# Patient Record
Sex: FEMALE | Race: WHITE | NOT HISPANIC OR LATINO | Employment: OTHER | URBAN - METROPOLITAN AREA
[De-identification: names, ages, dates, MRNs, and addresses within clinical notes are randomized per-mention and may not be internally consistent; named-entity substitution may affect disease eponyms.]

---

## 2021-03-03 ENCOUNTER — MEDICAL CORRESPONDENCE (OUTPATIENT)
Dept: HEALTH INFORMATION MANAGEMENT | Facility: CLINIC | Age: 86
End: 2021-03-03

## 2021-08-05 ENCOUNTER — HOSPITAL ENCOUNTER (EMERGENCY)
Facility: CLINIC | Age: 86
Discharge: HOME | End: 2021-08-05
Attending: EMERGENCY MEDICINE | Admitting: EMERGENCY MEDICINE
Payer: COMMERCIAL

## 2021-08-05 VITALS
OXYGEN SATURATION: 97 % | HEIGHT: 66 IN | DIASTOLIC BLOOD PRESSURE: 79 MMHG | TEMPERATURE: 97.9 F | SYSTOLIC BLOOD PRESSURE: 179 MMHG | WEIGHT: 160 LBS | RESPIRATION RATE: 24 BRPM | BODY MASS INDEX: 25.71 KG/M2 | HEART RATE: 79 BPM

## 2021-08-05 DIAGNOSIS — Z04.9 SUSPECTED CONDITION NOT FOUND: ICD-10-CM

## 2021-08-05 LAB
ANION GAP SERPL CALCULATED.3IONS-SCNC: 8 MMOL/L (ref 5–18)
BUN SERPL-MCNC: 35 MG/DL (ref 8–28)
CALCIUM SERPL-MCNC: 9.5 MG/DL (ref 8.5–10.5)
CHLORIDE BLD-SCNC: 104 MMOL/L (ref 98–107)
CO2 SERPL-SCNC: 29 MMOL/L (ref 22–31)
CREAT SERPL-MCNC: 0.9 MG/DL (ref 0.6–1.1)
DIGOXIN SERPL-MCNC: 0.8 UG/L
GFR SERPL CREATININE-BSD FRML MDRD: 57 ML/MIN/1.73M2
GLUCOSE BLD-MCNC: 108 MG/DL (ref 70–125)
HOLD SPECIMEN: NORMAL
POTASSIUM BLD-SCNC: 4.4 MMOL/L (ref 3.5–5)
SODIUM SERPL-SCNC: 141 MMOL/L (ref 136–145)

## 2021-08-05 PROCEDURE — 93005 ELECTROCARDIOGRAM TRACING: CPT

## 2021-08-05 PROCEDURE — 80048 BASIC METABOLIC PNL TOTAL CA: CPT | Performed by: EMERGENCY MEDICINE

## 2021-08-05 PROCEDURE — 36415 COLL VENOUS BLD VENIPUNCTURE: CPT | Performed by: EMERGENCY MEDICINE

## 2021-08-05 PROCEDURE — 93005 ELECTROCARDIOGRAM TRACING: CPT | Performed by: EMERGENCY MEDICINE

## 2021-08-05 PROCEDURE — 80162 ASSAY OF DIGOXIN TOTAL: CPT | Performed by: EMERGENCY MEDICINE

## 2021-08-05 PROCEDURE — 99284 EMERGENCY DEPT VISIT MOD MDM: CPT

## 2021-08-05 ASSESSMENT — MIFFLIN-ST. JEOR: SCORE: 1172.51

## 2021-08-05 NOTE — ED TRIAGE NOTES
Patient presents to the ED after getting a phone call because her potassium is off. Patient does not recall if it was high or low. The labs were drawn today at [facility]. Patient denies feeling any different than normal. Hx of hypertension. Upon looking in care everywhere potassium is 6.1.

## 2021-08-05 NOTE — ED PROVIDER NOTES
Coming from home with a potassium of 6.1 that was found on screening labs after seeing primary care doctor today.     Ohl, Sal Hudson,   08/05/21 4905

## 2021-08-06 LAB
ATRIAL RATE - MUSE: 97 BPM
DIASTOLIC BLOOD PRESSURE - MUSE: 74 MMHG
INTERPRETATION ECG - MUSE: NORMAL
P AXIS - MUSE: NORMAL DEGREES
PR INTERVAL - MUSE: NORMAL MS
QRS DURATION - MUSE: 108 MS
QT - MUSE: 358 MS
QTC - MUSE: 418 MS
R AXIS - MUSE: 24 DEGREES
SYSTOLIC BLOOD PRESSURE - MUSE: 165 MMHG
T AXIS - MUSE: 236 DEGREES
VENTRICULAR RATE- MUSE: 82 BPM

## 2021-08-06 NOTE — ED PROVIDER NOTES
EMERGENCY DEPARTMENT ENCOUNTER      NAME: Alicia Coates  AGE: 88 year old female  YOB: 1932  MRN: [id number]  EVALUATION DATE & TIME: 2021  7:27 PM    PCP: No primary care provider on file.    ED PROVIDER: Hermes Barbosa M.D.      Chief Complaint   Patient presents with     Abnormal Labs       FINAL IMPRESSION:  1. Suspected condition not found        ED COURSE & MEDICAL DECISION MAKIN year old female presents to the Emergency Department for evaluation of abnormal lab test.  She was seen in clinic receiving routine lab draw today when her potassium was found to be elevated at 6.1.  On redraw today her potassium is actually 4.4, suspect this result was erroneous due to hemolysis.  Patient herself has no complaints.  EKG shows chronic rate controlled atrial fibrillation, she does have some T wave inversions, it looks like reading the interpretations of outside record EKGs this likely is all chronic and stable especially in the absence of any new chest pain or dyspnea.  She was eager to leave after her repeat lab results here were finalized.  She can follow-up as needed with primary care to review any ongoing concerns.    At the conclusion of the encounter I discussed the results of all of the tests and the disposition. The questions were answered. The patient or family acknowledged understanding and was agreeable with the care plan.       MEDICATIONS GIVEN IN THE EMERGENCY:  Medications - No data to display    NEW PRESCRIPTIONS STARTED AT TODAY'S ER VISIT  Discharge Medication List as of 2021  8:57 PM             =================================================================    HPI    Patient information was obtained from: patient    Use of : N/A      Alicia Coates is a 88 year old female with a pertinent history of hypertension who presents to this ED via private vehicle for evaluation of an abnormal lab result.    Per chart review, patient was seen today at  [facility] with her PCP for a regular check up with labs. She had a potassium of 6.1.    Patient reports that she was called by her PCP to come in to the ED due to her potassium levels from earlier today. She denies any complaints, including shortness of breath, urinary symptoms, changes in bowel movements, abdominal pain, lightheadedness, chest pain, or any other complaints.    REVIEW OF SYSTEMS   All systems reviewed and negative except as noted in HPI.    PAST MEDICAL HISTORY:  No past medical history on file.    PAST SURGICAL HISTORY:  Past Surgical History:   Procedure Laterality Date     NO HISTORY OF SURGERY  6/11/13    derm           CURRENT MEDICATIONS:    No current facility-administered medications for this encounter.     Current Outpatient Medications   Medication     ammonium lactate (AMLACTIN) 12 % cream     clobetasol (TEMOVATE) 0.05 % external solution     digoxin (LANOXIN) 0.25 MG tablet     fluocinolone (DERMA-SMOOTHE/FS BODY) 0.01 % external oil     furosemide (LASIX) 10 MG TABS     hydrochlorothiazide (HYDRODIURIL) 25 MG tablet     ketoconazole (NIZORAL) 2 % shampoo     LEVOTHYROXINE SODIUM     metoprolol (TOPROL-XL) 50 MG 24 hr tablet     Multiple Vitamins-Minerals (MULTIVITAMIN & MINERAL PO)     rosuvastatin (CRESTOR) 10 MG tablet     terbinafine (LAMISIL AT) 1 % cream     WARFARIN SODIUM PO         ALLERGIES:  Allergies   Allergen Reactions     Neomycin Unknown     Nickel Unknown       FAMILY HISTORY:  No family history on file.    SOCIAL HISTORY:   Social History     Socioeconomic History     Marital status:      Spouse name: Not on file     Number of children: Not on file     Years of education: Not on file     Highest education level: Not on file   Occupational History     Not on file   Tobacco Use     Smoking status: Never Smoker     Smokeless tobacco: Never Used   Substance and Sexual Activity     Alcohol use: Not on file     Drug use: Not on file     Sexual activity: Not on  "file   Other Topics Concern     Parent/sibling w/ CABG, MI or angioplasty before 65F 55M? Not Asked   Social History Narrative     Not on file     Social Determinants of Health     Financial Resource Strain:      Difficulty of Paying Living Expenses:    Food Insecurity:      Worried About Running Out of Food in the Last Year:      Ran Out of Food in the Last Year:    Transportation Needs:      Lack of Transportation (Medical):      Lack of Transportation (Non-Medical):    Physical Activity:      Days of Exercise per Week:      Minutes of Exercise per Session:    Stress:      Feeling of Stress :    Social Connections:      Frequency of Communication with Friends and Family:      Frequency of Social Gatherings with Friends and Family:      Attends Congregational Services:      Active Member of Clubs or Organizations:      Attends Club or Organization Meetings:      Marital Status:    Intimate Partner Violence:      Fear of Current or Ex-Partner:      Emotionally Abused:      Physically Abused:      Sexually Abused:        VITALS:  BP (!) 179/79   Pulse 79   Temp 97.9  F (36.6  C) (Oral)   Resp 24   Ht 1.676 m (5' 6\")   Wt 72.6 kg (160 lb)   SpO2 97%   BMI 25.82 kg/m      PHYSICAL EXAM    Constitutional: Thin chronically ill-appearing elderly female patient, laying in bed, no distress  HENT: Normocephalic, Atraumatic. Neck Supple.  Eyes: EOMI, Conjunctiva normal.  Respiratory: Breathing comfortably on room air. Speaks full sentences easily. Lungs clear to ascultation.  Cardiovascular: Irregular heart rate, Irregular rhythm. No peripheral edema.  Abdomen: Soft, nontender  Musculoskeletal: Good range of motion in all major joints. No major deformities noted.  Integument: Warm, Dry.  Neurologic: Alert & awake, Normal motor function, Normal sensory function, No focal deficits noted.   Psychiatric: Cooperative. Affect appropriate.     LAB:  All pertinent labs reviewed and interpreted.  Labs Ordered and Resulted from Time " of ED Arrival Up to the Time of Departure from the ED   BASIC METABOLIC PANEL - Abnormal; Notable for the following components:       Result Value    Urea Nitrogen 35 (*)     GFR Estimate 57 (*)     All other components within normal limits   DIGOXIN LEVEL - Normal   EXTRA RED TOP TUBE   EXTRA GREEN TOP (LITHIUM HEPARIN) TUBE   EXTRA PURPLE TOP TUBE   EXTRA TUBE    Narrative:     The following orders were created for panel order [facility] Draw.  Procedure                               Abnormality         Status                     ---------                               -----------         ------                     Extra Red Top Tube[[id number]]                               Final result               Extra Green Top (Lithium...[[id number]]                      Final result               Extra Purple Top Tube[[id number]]                            Final result                 Please view results for these tests on the individual orders.         EKG:    Performed at: 20:14    Impression: Atrial fibrillation, incomplete right bundle branch block, minimal voltage criteria for LVH , anteroseptal infarct, ST and T wave abnormality consider inferolateral ischemia.    Rate: 82  Rhythm: Atrial fibrillation  Axis: 24  QRS Interval: 108  QTc Interval: 418  Comparison: No previous available    I have independently reviewed and interpreted the EKG(s) documented above.        I, Neena Sunshine, am serving as a scribe to document services personally performed by Dr. Hermes Barbosa, based on my observation and the provider's statements to me. I, Hermes Barbosa MD attest that Neena Sunshine is acting in a scribe capacity, has observed my performance of the services and has documented them in accordance with my direction.    Hermes Barbosa M.D.  Emergency Medicine  [facility] EMERGENCY ROOM  [address] [facility] [address] [phone]  Dept: [phone]     Hermes Barbosa,  MD  08/06/21 021

## 2021-08-06 NOTE — ED NOTES
Patient was told to come in to be seen by PCP as her potassium was elevated. She is having no s/s of elevated potassium.

## 2021-08-06 NOTE — DISCHARGE INSTRUCTIONS
You were seen today in the [facility] emergency department for elevated potassium levels on an outpatient blood draw.  Thankfully on a redraw of your blood today your potassium levels are normal.  This is a very common lab error that happens when there is hemolysis of your red blood cells.  We are glad that you are feeling well today and we do not think you need any additional follow-up specifically after this ED visit.  You can contact your primary doctor if you have any additional questions or concerns related to your encounter today.

## 2023-01-01 ENCOUNTER — LAB REQUISITION (OUTPATIENT)
Dept: LAB | Facility: CLINIC | Age: 88
End: 2023-01-01
Payer: COMMERCIAL

## 2023-01-01 DIAGNOSIS — R79.1 ABNORMAL COAGULATION PROFILE: ICD-10-CM

## 2023-01-01 DIAGNOSIS — R79.9 ABNORMAL FINDING OF BLOOD CHEMISTRY, UNSPECIFIED: ICD-10-CM

## 2023-01-01 LAB
INR PPP: 2.02 (ref 0.85–1.15)
INR PPP: 2.06 (ref 0.85–1.15)
INR PPP: 2.18 (ref 0.85–1.15)
INR PPP: 2.31 (ref 0.85–1.15)
INR PPP: 3.01 (ref 0.85–1.15)

## 2023-01-01 PROCEDURE — 36415 COLL VENOUS BLD VENIPUNCTURE: CPT | Mod: ORL | Performed by: FAMILY MEDICINE

## 2023-01-01 PROCEDURE — 85610 PROTHROMBIN TIME: CPT | Mod: ORL

## 2023-01-01 PROCEDURE — 85610 PROTHROMBIN TIME: CPT | Mod: ORL | Performed by: FAMILY MEDICINE

## 2023-01-01 PROCEDURE — P9603 ONE-WAY ALLOW PRORATED MILES: HCPCS | Mod: ORL | Performed by: FAMILY MEDICINE

## 2023-01-01 PROCEDURE — P9604 ONE-WAY ALLOW PRORATED TRIP: HCPCS | Mod: ORL | Performed by: FAMILY MEDICINE

## 2023-01-01 PROCEDURE — P9603 ONE-WAY ALLOW PRORATED MILES: HCPCS | Mod: ORL

## 2023-01-01 PROCEDURE — 36415 COLL VENOUS BLD VENIPUNCTURE: CPT | Mod: ORL

## 2023-02-15 ENCOUNTER — HOSPITAL ENCOUNTER (EMERGENCY)
Facility: CLINIC | Age: 88
Discharge: HOME | End: 2023-02-15
Attending: STUDENT IN AN ORGANIZED HEALTH CARE EDUCATION/TRAINING PROGRAM | Admitting: STUDENT IN AN ORGANIZED HEALTH CARE EDUCATION/TRAINING PROGRAM
Payer: COMMERCIAL

## 2023-02-15 ENCOUNTER — HOSPITAL ENCOUNTER (EMERGENCY)
Facility: CLINIC | Age: 88
End: 2023-02-15
Payer: COMMERCIAL

## 2023-02-15 VITALS
HEIGHT: 66 IN | OXYGEN SATURATION: 97 % | BODY MASS INDEX: 22.5 KG/M2 | SYSTOLIC BLOOD PRESSURE: 198 MMHG | DIASTOLIC BLOOD PRESSURE: 94 MMHG | TEMPERATURE: 98.9 F | WEIGHT: 140 LBS | RESPIRATION RATE: 16 BRPM | HEART RATE: 86 BPM

## 2023-02-15 DIAGNOSIS — R04.0 EPISTAXIS: ICD-10-CM

## 2023-02-15 LAB
ERYTHROCYTE [DISTWIDTH] IN BLOOD BY AUTOMATED COUNT: 14.1 % (ref 10–15)
HCT VFR BLD AUTO: 34.3 % (ref 35–47)
HGB BLD-MCNC: 11.2 G/DL (ref 11.7–15.7)
INR PPP: 2.17 (ref 0.85–1.15)
MCH RBC QN AUTO: 29.7 PG (ref 26.5–33)
MCHC RBC AUTO-ENTMCNC: 32.7 G/DL (ref 31.5–36.5)
MCV RBC AUTO: 91 FL (ref 78–100)
PLATELET # BLD AUTO: 210 10E3/UL (ref 150–450)
RBC # BLD AUTO: 3.77 10E6/UL (ref 3.8–5.2)
WBC # BLD AUTO: 8.5 10E3/UL (ref 4–11)

## 2023-02-15 PROCEDURE — 99283 EMERGENCY DEPT VISIT LOW MDM: CPT

## 2023-02-15 PROCEDURE — 99283 EMERGENCY DEPT VISIT LOW MDM: CPT | Mod: 25

## 2023-02-15 PROCEDURE — 85610 PROTHROMBIN TIME: CPT | Performed by: EMERGENCY MEDICINE

## 2023-02-15 PROCEDURE — 36415 COLL VENOUS BLD VENIPUNCTURE: CPT | Performed by: EMERGENCY MEDICINE

## 2023-02-15 PROCEDURE — 30901 CONTROL OF NOSEBLEED: CPT | Mod: LT

## 2023-02-15 PROCEDURE — 250N000009 HC RX 250: Performed by: EMERGENCY MEDICINE

## 2023-02-15 PROCEDURE — 85027 COMPLETE CBC AUTOMATED: CPT | Performed by: EMERGENCY MEDICINE

## 2023-02-15 RX ORDER — OXYMETAZOLINE HYDROCHLORIDE 0.05 G/100ML
2 SPRAY NASAL ONCE
Status: COMPLETED | OUTPATIENT
Start: 2023-02-15 | End: 2023-02-15

## 2023-02-15 RX ORDER — TRANEXAMIC ACID 100 MG/ML
500 INJECTION, SOLUTION INTRAVENOUS ONCE
Status: COMPLETED | OUTPATIENT
Start: 2023-02-15 | End: 2023-02-15

## 2023-02-15 RX ORDER — OXYMETAZOLINE HYDROCHLORIDE 0.05 G/100ML
2 SPRAY NASAL ONCE
Status: DISCONTINUED | OUTPATIENT
Start: 2023-02-15 | End: 2023-02-16 | Stop reason: HOSPADM

## 2023-02-15 RX ADMIN — OXYMETAZOLINE HCL 2 SPRAY: 0.05 SPRAY NASAL at 23:28

## 2023-02-15 RX ADMIN — TRANEXAMIC ACID 500 MG: 1 INJECTION, SOLUTION INTRAVENOUS at 23:28

## 2023-02-15 ASSESSMENT — ENCOUNTER SYMPTOMS
DIZZINESS: 0
LIGHT-HEADEDNESS: 0

## 2023-02-15 ASSESSMENT — ACTIVITIES OF DAILY LIVING (ADL): ADLS_ACUITY_SCORE: 35

## 2023-02-16 ENCOUNTER — HOSPITAL ENCOUNTER (EMERGENCY)
Facility: CLINIC | Age: 88
Discharge: HOME | End: 2023-02-16
Attending: EMERGENCY MEDICINE | Admitting: EMERGENCY MEDICINE
Payer: COMMERCIAL

## 2023-02-16 VITALS
BODY MASS INDEX: 25.82 KG/M2 | OXYGEN SATURATION: 95 % | WEIGHT: 160 LBS | TEMPERATURE: 97.5 F | SYSTOLIC BLOOD PRESSURE: 140 MMHG | RESPIRATION RATE: 18 BRPM | HEART RATE: 70 BPM | DIASTOLIC BLOOD PRESSURE: 82 MMHG

## 2023-02-16 DIAGNOSIS — R04.0 EPISTAXIS: ICD-10-CM

## 2023-02-16 NOTE — ED TRIAGE NOTES
Here for epistaxis, was seen about 1 hour ago at [facility] for same reason. Received afrin spray and got discharged.   Pt is on blood thinners. Nose clamp in place.      Triage Assessment     Row Name 02/15/23 2445       Triage Assessment (Adult)    Airway WDL WDL       Respiratory WDL    Respiratory WDL WDL       Skin Circulation/Temperature WDL    Skin Circulation/Temperature WDL WDL       Cardiac WDL    Cardiac WDL WDL       Peripheral/Neurovascular WDL    Peripheral Neurovascular WDL WDL       Cognitive/Neuro/Behavioral WDL    Cognitive/Neuro/Behavioral WDL WDL

## 2023-02-16 NOTE — DISCHARGE INSTRUCTIONS
Please return for any concerns.  As we discussed if the nose starts bleeding again use the Afrin, nose clamp provided in the emergency department for 30 minutes without removal to see if this does stop the bleeding.  If it does not stop the bleeding please return to the emergency department for further evaluation.

## 2023-02-16 NOTE — ED PROVIDER NOTES
EMERGENCY DEPARTMENT ENCOUNTER      NAME: Alicia Coates  AGE: 89 year old female  YOB: 1932  MRN: [id number]  EVALUATION DATE & TIME: No admission date for patient encounter.    PCP: Annita Coker    ED PROVIDER: Mairaluisa Brooks M.D.      Chief Complaint   Patient presents with     Epistaxis         FINAL IMPRESSION:  1. Epistaxis        MEDICAL DECISION MAKING:    Pertinent Labs & Imaging studies reviewed. (See chart for details)  ED Course as of 02/16/23 0144   Wed Feb 15, 2023   2322 Afebrile.  Vital signs here with hypertension.  Patient is coming in for evaluation of epistaxis.  Just recently seen discharged an hour ago for same reason.  History of warfarin use, INR has been therapeutic at home, no new levels elevated.  Here she had Afrin spray, nose clamp placed and had improvement of her symptoms.  By the time they got home she began bleeding again.  She did put her nose clamp on and does have resolution of her bleeding with her nose clamp however she takes it off she has recurrent bleeding.  She does not have any dizziness or lightheadedness.  No falls or trauma.    Physical exam for patient here sitting in chair without any acute cardiopulmonary distress.  She has dried blood around her right nare.  No active bleeding noted at this time with clamp in place.  No bleeding actively down her posterior oropharynx.    Handmade clamp was made with tongue depressors and tape, will try Afrin again, we will have her clear nasal passages and then use TXA as well.   2332 Had patient clear nasal passages with minimal amount of blood from right nare.  No active bleeding noted.  Used nasal spray TXA as well as Afrin, reclamped and will check in 30 minutes.     No further bleeding after clamp removed.  Patient given additional clamps to take home as well as the remaining Afrin.  Instructed patient and patient's son what to do if rebleed were to recur.  They are in agreement with plan    Medical Decision  Making    History:    Supplemental history from: Documented in chart, if applicable    External Record(s) reviewed: Documented in chart, if applicable.    Work Up:    Chart documentation includes differential considered and any EKGs or imaging independently interpreted by provider, where specified.    In additional to work up documented, I considered the following work up: Documented in chart, if applicable.    External consultation:    Discussion of management with another provider: Documented in chart, if applicable    Complicating factors:    Care impacted by chronic illness: Anticoagulated State, Dementia, Heart Disease and Hypertension    Care affected by social determinants of health: N/A    Disposition considerations: Discharge. No recommendations on prescription strength medication(s). See documentation for any additional details.          Critical care: 0 minutes excluding separately billable procedures.  Includes bedside management, time reviewing test results, review of records, discussing the case with staff, documenting the medical record and time spent with family members (or surrogate decision makers) discussing specific treatment issues.          ED COURSE:  11:18 PM I met with the patient, obtained history, performed an initial exam, and discussed options and plan for diagnostics and treatment here in the ED.  12:49 AM Re-evaluated patient.    1:36 AM Checked in on and updated patient. Patient's bleeding has stopped.     The importance of close follow up was discussed. We reviewed warning signs and symptoms, and I instructed Ms. Coates to return to the emergency department immediately if she develops any new or worsening symptoms. I provided additional verbal discharge instructions. Ms. Coates expressed understanding and agreement with this plan of care, her questions were answered, and she was discharged in stable condition.     MEDICATIONS GIVEN IN THE EMERGENCY:  Medications   oxymetazoline  (AFRIN) 0.05 % spray 2 spray (2 sprays Nasal Given by Other Clinician 2/15/23 7142)   tranexamic acid (CYKLOKAPRON) spray 500 mg (500 mg Right nostril Given by Other Clinician 2/15/23 2038)       NEW PRESCRIPTIONS STARTED AT TODAY'S ER VISIT:  New Prescriptions    No medications on file          =================================================================    HPI    Patient information was obtained from: Patient     Use of : N/A         Alicia Coates is a 89 year old female who presents for epistaxis.     Patient is coming in for evaluation of epistaxis. Just recently seen discharged an hour ago for same reason.  History of warfarin use, INR has been therapeutic at home, no new levels elevated. Patient had an Afrin spray here and nose clamp placed with improvement in symptoms. When discharged at at home bleeding started again. Denies dizziness or lightheadedness. No falls or trauma.    REVIEW OF SYSTEMS   Review of Systems   HENT: Positive for nosebleeds.    Neurological: Negative for dizziness and light-headedness.   All other systems reviewed and are negative.        PAST MEDICAL HISTORY:  History reviewed. No pertinent past medical history.    PAST SURGICAL HISTORY:  Past Surgical History:   Procedure Laterality Date     NO HISTORY OF SURGERY  6/11/13    derm       CURRENT MEDICATIONS:    No current facility-administered medications for this encounter.    Current Outpatient Medications:      ammonium lactate (AMLACTIN) 12 % cream, Apply  topically. Apply on trunk and extremities daily as a moisturizer., Disp: 385 g, Rfl: 11     clobetasol (TEMOVATE) 0.05 % external solution, Alterate with Tsal, Head and Shoulders (approximately 2-3x a week) - leave in approximately 5 minutes, massage well into scalp., Disp: 59 mL, Rfl: 0     digoxin (LANOXIN) 0.25 MG tablet, Take 250 mcg by mouth daily., Disp: , Rfl:      fluocinolone (DERMA-SMOOTHE/FS BODY) 0.01 % external oil, Apply  topically. Apply to  entire scalp for at least 4 hours before washing off. For scalp dermatitis, scaliness, dryness., Disp: 118 mL, Rfl: 3     furosemide (LASIX) 10 MG TABS, Take 10 mg by mouth daily., Disp: , Rfl:      hydrochlorothiazide (HYDRODIURIL) 25 MG tablet, Take 25 mg by mouth daily., Disp: , Rfl:      ketoconazole (NIZORAL) 2 % shampoo, Apply  topically daily as needed for itching., Disp: 120 mL, Rfl: 5     LEVOTHYROXINE SODIUM, .025 mg daily, Disp: , Rfl:      metoprolol (TOPROL-XL) 50 MG 24 hr tablet, Take 50 mg by mouth daily., Disp: , Rfl:      Multiple Vitamins-Minerals (MULTIVITAMIN & MINERAL PO), Take 1 tablet by mouth daily., Disp: , Rfl:      rosuvastatin (CRESTOR) 10 MG tablet, Take 10 mg by mouth daily., Disp: , Rfl:      terbinafine (LAMISIL AT) 1 % cream, Apply  topically 2 times daily. Apply topically to feet especially between toes twice daily for 1 month, Disp: 45 g, Rfl: 2     WARFARIN SODIUM PO, 5 mg 6 days. 2.5 mg 1 day, Disp: , Rfl:     ALLERGIES:  Allergies   Allergen Reactions     Neomycin Unknown     Nickel Unknown       FAMILY HISTORY:  History reviewed. No pertinent family history.    SOCIAL HISTORY:   Social History     Socioeconomic History     Marital status:    Tobacco Use     Smoking status: Never     Smokeless tobacco: Never       PHYSICAL EXAM:    Vitals: BP (!) 140/82   Pulse 70   Temp 97.5  F (36.4  C) (Temporal)   Resp 18   Wt 72.6 kg (160 lb)   SpO2 95%   BMI 25.82 kg/m     General:. Alert and interactive, comfortable appearing. Sitting in chair without any acute cardiopulmonary distress.  HENT: Oropharynx without erythema or exudates. MMM.  TMs clear bilaterally. Dried blood around her right nare, no active bleeding at time with clamp in place. No bleeding actively down her posterior oropharynx.  Eyes: Pupils mid-sized and equally reactive.   Neck: Full AROM.  No midline tenderness to palpation.  Cardiovascular: Regular rate and rhythm. Peripheral pulses 2+  bilaterally.  Chest/Pulmonary: Normal work of breathing. Lung sounds clear and equal throughout, no wheezes or crackles. No chest wall tenderness or deformities.  Skin: Warm and dry. Normal skin color.   Neuro: Speech clear. CNs grossly intact. Moves all extremities appropriately. Strength and sensation grossly intact to all extremities.   Psych: Normal affect/mood, cooperative, memory appropriate.     LAB:  All pertinent labs reviewed and interpreted.  Labs Ordered and Resulted from Time of ED Arrival to Time of ED Departure   INR - Abnormal       Result Value    INR 2.17 (*)    CBC WITH PLATELETS - Abnormal    WBC Count 8.5      RBC Count 3.77 (*)     Hemoglobin 11.2 (*)     Hematocrit 34.3 (*)     MCV 91      MCH 29.7      MCHC 32.7      RDW 14.1      Platelet Count 210         RADIOLOGY:  No orders to display         I, Leigh Moreira, am serving as a scribe to document services personally performed by Dr. Marialuisa Brooks  based on my observation and the provider's statements to me. I, Marialuisa Brooks MD attest that Leigh Moreira is acting in a scribe capacity, has observed my performance of the services and has documented them in accordance with my direction.      Marialuisa Brooks M.D.  Emergency Medicine  [facility] EMERGENCY ROOM  [address] [address] [phone]  Dept: [phone]     Marialuisa Brooks MD  02/16/23 0145

## 2023-02-16 NOTE — ED TRIAGE NOTES
Pt arrives to ED with c/o blood nose for the past hour. Pt is on war warfarin for afib and states this has never happened before. Denies clots. Bleeding controlled with nasal clamp.      Triage Assessment     Row Name 02/15/23 8330       Triage Assessment (Adult)    Airway WDL WDL       Respiratory WDL    Respiratory WDL WDL       Skin Circulation/Temperature WDL    Skin Circulation/Temperature WDL WDL       Cardiac WDL    Cardiac WDL WDL       Peripheral/Neurovascular WDL    Peripheral Neurovascular WDL WDL       Cognitive/Neuro/Behavioral WDL    Cognitive/Neuro/Behavioral WDL WDL

## 2023-06-25 ENCOUNTER — HOSPITAL ENCOUNTER (OUTPATIENT)
Facility: HOSPITAL | Age: 88
Setting detail: OBSERVATION
Discharge: NURSING, REHAB OR LONG-TERM CARE | End: 2023-06-27
Attending: EMERGENCY MEDICINE | Admitting: FAMILY MEDICINE
Payer: COMMERCIAL

## 2023-06-25 ENCOUNTER — APPOINTMENT (OUTPATIENT)
Dept: RADIOLOGY | Facility: HOSPITAL | Age: 88
End: 2023-06-25
Attending: EMERGENCY MEDICINE
Payer: COMMERCIAL

## 2023-06-25 ENCOUNTER — APPOINTMENT (OUTPATIENT)
Dept: CT IMAGING | Facility: HOSPITAL | Age: 88
End: 2023-06-25
Attending: EMERGENCY MEDICINE
Payer: COMMERCIAL

## 2023-06-25 DIAGNOSIS — R53.1 WEAKNESS GENERALIZED: ICD-10-CM

## 2023-06-25 DIAGNOSIS — R63.0 ANOREXIA: ICD-10-CM

## 2023-06-25 DIAGNOSIS — E43 SEVERE PROTEIN-CALORIE MALNUTRITION (H): Primary | ICD-10-CM

## 2023-06-25 PROBLEM — N18.9 CKD (CHRONIC KIDNEY DISEASE): Status: ACTIVE | Noted: 2023-06-25

## 2023-06-25 PROBLEM — I48.20 CHRONIC ATRIAL FIBRILLATION (H): Status: ACTIVE | Noted: 2023-06-25

## 2023-06-25 PROBLEM — E11.9 T2DM (TYPE 2 DIABETES MELLITUS) (H): Status: ACTIVE | Noted: 2023-06-25

## 2023-06-25 PROBLEM — I10 HTN (HYPERTENSION): Status: ACTIVE | Noted: 2023-06-25

## 2023-06-25 PROBLEM — E78.5 HLD (HYPERLIPIDEMIA): Status: ACTIVE | Noted: 2023-06-25

## 2023-06-25 PROBLEM — E03.9 HYPOTHYROIDISM: Status: ACTIVE | Noted: 2023-06-25

## 2023-06-25 LAB
ALBUMIN SERPL BCG-MCNC: 3.4 G/DL (ref 3.5–5.2)
ALBUMIN UR-MCNC: 30 MG/DL
ALP SERPL-CCNC: 93 U/L (ref 35–104)
ALT SERPL W P-5'-P-CCNC: 19 U/L (ref 0–50)
ANION GAP SERPL CALCULATED.3IONS-SCNC: 11 MMOL/L (ref 7–15)
APPEARANCE UR: CLEAR
AST SERPL W P-5'-P-CCNC: 29 U/L (ref 0–45)
BASOPHILS # BLD AUTO: 0 10E3/UL (ref 0–0.2)
BASOPHILS NFR BLD AUTO: 0 %
BILIRUB DIRECT SERPL-MCNC: 0.42 MG/DL (ref 0–0.3)
BILIRUB SERPL-MCNC: 1.3 MG/DL
BILIRUB UR QL STRIP: NEGATIVE
BUN SERPL-MCNC: 21.7 MG/DL (ref 8–23)
CALCIUM SERPL-MCNC: 9.6 MG/DL (ref 8.2–9.6)
CHLORIDE SERPL-SCNC: 99 MMOL/L (ref 98–107)
COLOR UR AUTO: YELLOW
CREAT SERPL-MCNC: 0.71 MG/DL (ref 0.51–0.95)
DEPRECATED HCO3 PLAS-SCNC: 24 MMOL/L (ref 22–29)
DIGOXIN SERPL-MCNC: 1.2 NG/ML (ref 0.6–2)
EOSINOPHIL # BLD AUTO: 0 10E3/UL (ref 0–0.7)
EOSINOPHIL NFR BLD AUTO: 0 %
ERYTHROCYTE [DISTWIDTH] IN BLOOD BY AUTOMATED COUNT: 14.3 % (ref 10–15)
GFR SERPL CREATININE-BSD FRML MDRD: 80 ML/MIN/1.73M2
GLUCOSE BLDC GLUCOMTR-MCNC: 120 MG/DL (ref 70–99)
GLUCOSE SERPL-MCNC: 136 MG/DL (ref 70–99)
GLUCOSE UR STRIP-MCNC: NEGATIVE MG/DL
HBA1C MFR BLD: 8.4 %
HCT VFR BLD AUTO: 42.7 % (ref 35–47)
HGB BLD-MCNC: 14.2 G/DL (ref 11.7–15.7)
HGB UR QL STRIP: NEGATIVE
HOLD SPECIMEN: NORMAL
IMM GRANULOCYTES # BLD: 0 10E3/UL
IMM GRANULOCYTES NFR BLD: 0 %
INR PPP: 2.63 (ref 0.85–1.15)
KETONES UR STRIP-MCNC: 10 MG/DL
LACTATE SERPL-SCNC: 1 MMOL/L (ref 0.7–2)
LEUKOCYTE ESTERASE UR QL STRIP: NEGATIVE
LIPASE SERPL-CCNC: 20 U/L (ref 13–60)
LYMPHOCYTES # BLD AUTO: 1.1 10E3/UL (ref 0.8–5.3)
LYMPHOCYTES NFR BLD AUTO: 11 %
MCH RBC QN AUTO: 28.5 PG (ref 26.5–33)
MCHC RBC AUTO-ENTMCNC: 33.3 G/DL (ref 31.5–36.5)
MCV RBC AUTO: 86 FL (ref 78–100)
MONOCYTES # BLD AUTO: 0.9 10E3/UL (ref 0–1.3)
MONOCYTES NFR BLD AUTO: 10 %
NEUTROPHILS # BLD AUTO: 7.3 10E3/UL (ref 1.6–8.3)
NEUTROPHILS NFR BLD AUTO: 79 %
NITRATE UR QL: NEGATIVE
NRBC # BLD AUTO: 0 10E3/UL
NRBC BLD AUTO-RTO: 0 /100
PH UR STRIP: 5.5 [PH] (ref 5–7)
PLATELET # BLD AUTO: 245 10E3/UL (ref 150–450)
POTASSIUM SERPL-SCNC: 4 MMOL/L (ref 3.4–5.3)
PROT SERPL-MCNC: 7 G/DL (ref 6.4–8.3)
RBC # BLD AUTO: 4.99 10E6/UL (ref 3.8–5.2)
RBC URINE: <1 /HPF
SODIUM SERPL-SCNC: 134 MMOL/L (ref 136–145)
SP GR UR STRIP: 1.03 (ref 1–1.03)
SQUAMOUS EPITHELIAL: <1 /HPF
T4 FREE SERPL-MCNC: 1.28 NG/DL (ref 0.9–1.7)
TROPONIN T SERPL HS-MCNC: 26 NG/L
TSH SERPL DL<=0.005 MIU/L-ACNC: 4.95 UIU/ML (ref 0.3–4.2)
UROBILINOGEN UR STRIP-MCNC: 3 MG/DL
WBC # BLD AUTO: 9.4 10E3/UL (ref 4–11)
WBC URINE: <1 /HPF

## 2023-06-25 PROCEDURE — 36415 COLL VENOUS BLD VENIPUNCTURE: CPT | Performed by: EMERGENCY MEDICINE

## 2023-06-25 PROCEDURE — 84484 ASSAY OF TROPONIN QUANT: CPT | Performed by: EMERGENCY MEDICINE

## 2023-06-25 PROCEDURE — 82962 GLUCOSE BLOOD TEST: CPT

## 2023-06-25 PROCEDURE — 84439 ASSAY OF FREE THYROXINE: CPT | Performed by: EMERGENCY MEDICINE

## 2023-06-25 PROCEDURE — 258N000003 HC RX IP 258 OP 636

## 2023-06-25 PROCEDURE — 82248 BILIRUBIN DIRECT: CPT | Performed by: EMERGENCY MEDICINE

## 2023-06-25 PROCEDURE — 96374 THER/PROPH/DIAG INJ IV PUSH: CPT

## 2023-06-25 PROCEDURE — G0378 HOSPITAL OBSERVATION PER HR: HCPCS

## 2023-06-25 PROCEDURE — 84484 ASSAY OF TROPONIN QUANT: CPT

## 2023-06-25 PROCEDURE — 83605 ASSAY OF LACTIC ACID: CPT | Performed by: EMERGENCY MEDICINE

## 2023-06-25 PROCEDURE — 250N000011 HC RX IP 250 OP 636: Performed by: EMERGENCY MEDICINE

## 2023-06-25 PROCEDURE — 99285 EMERGENCY DEPT VISIT HI MDM: CPT | Mod: 25

## 2023-06-25 PROCEDURE — 36415 COLL VENOUS BLD VENIPUNCTURE: CPT | Performed by: STUDENT IN AN ORGANIZED HEALTH CARE EDUCATION/TRAINING PROGRAM

## 2023-06-25 PROCEDURE — 71045 X-RAY EXAM CHEST 1 VIEW: CPT

## 2023-06-25 PROCEDURE — 96360 HYDRATION IV INFUSION INIT: CPT

## 2023-06-25 PROCEDURE — 36415 COLL VENOUS BLD VENIPUNCTURE: CPT

## 2023-06-25 PROCEDURE — 85025 COMPLETE CBC W/AUTO DIFF WBC: CPT | Performed by: EMERGENCY MEDICINE

## 2023-06-25 PROCEDURE — 84443 ASSAY THYROID STIM HORMONE: CPT | Performed by: EMERGENCY MEDICINE

## 2023-06-25 PROCEDURE — 80162 ASSAY OF DIGOXIN TOTAL: CPT

## 2023-06-25 PROCEDURE — 96361 HYDRATE IV INFUSION ADD-ON: CPT

## 2023-06-25 PROCEDURE — 70450 CT HEAD/BRAIN W/O DYE: CPT

## 2023-06-25 PROCEDURE — 258N000003 HC RX IP 258 OP 636: Performed by: EMERGENCY MEDICINE

## 2023-06-25 PROCEDURE — 82310 ASSAY OF CALCIUM: CPT | Performed by: EMERGENCY MEDICINE

## 2023-06-25 PROCEDURE — 83690 ASSAY OF LIPASE: CPT | Performed by: EMERGENCY MEDICINE

## 2023-06-25 PROCEDURE — 83735 ASSAY OF MAGNESIUM: CPT

## 2023-06-25 PROCEDURE — 85610 PROTHROMBIN TIME: CPT | Performed by: EMERGENCY MEDICINE

## 2023-06-25 PROCEDURE — 81001 URINALYSIS AUTO W/SCOPE: CPT | Performed by: EMERGENCY MEDICINE

## 2023-06-25 PROCEDURE — 83036 HEMOGLOBIN GLYCOSYLATED A1C: CPT

## 2023-06-25 RX ORDER — ONDANSETRON 2 MG/ML
4 INJECTION INTRAMUSCULAR; INTRAVENOUS EVERY 6 HOURS PRN
Status: DISCONTINUED | OUTPATIENT
Start: 2023-06-25 | End: 2023-06-27 | Stop reason: HOSPADM

## 2023-06-25 RX ORDER — DIGOXIN 125 MCG
250 TABLET ORAL DAILY
Status: DISCONTINUED | OUTPATIENT
Start: 2023-06-26 | End: 2023-06-27 | Stop reason: HOSPADM

## 2023-06-25 RX ORDER — LISINOPRIL 20 MG/1
20 TABLET ORAL DAILY
Status: DISCONTINUED | OUTPATIENT
Start: 2023-06-26 | End: 2023-06-27 | Stop reason: HOSPADM

## 2023-06-25 RX ORDER — LEVOTHYROXINE SODIUM 25 UG/1
25 TABLET ORAL
COMMUNITY

## 2023-06-25 RX ORDER — ACETAMINOPHEN 325 MG/1
650 TABLET ORAL EVERY 6 HOURS PRN
Status: DISCONTINUED | OUTPATIENT
Start: 2023-06-25 | End: 2023-06-27 | Stop reason: HOSPADM

## 2023-06-25 RX ORDER — WARFARIN SODIUM 5 MG/1
2.5-5 TABLET ORAL DAILY
COMMUNITY
End: 2023-06-28 | Stop reason: DRUGHIGH

## 2023-06-25 RX ORDER — DEXTROSE MONOHYDRATE 25 G/50ML
25-50 INJECTION, SOLUTION INTRAVENOUS
Status: DISCONTINUED | OUTPATIENT
Start: 2023-06-25 | End: 2023-06-27 | Stop reason: HOSPADM

## 2023-06-25 RX ORDER — SODIUM CHLORIDE, SODIUM LACTATE, POTASSIUM CHLORIDE, CALCIUM CHLORIDE 600; 310; 30; 20 MG/100ML; MG/100ML; MG/100ML; MG/100ML
INJECTION, SOLUTION INTRAVENOUS CONTINUOUS
Status: ACTIVE | OUTPATIENT
Start: 2023-06-25 | End: 2023-06-26

## 2023-06-25 RX ORDER — NICOTINE POLACRILEX 4 MG
15-30 LOZENGE BUCCAL
Status: DISCONTINUED | OUTPATIENT
Start: 2023-06-25 | End: 2023-06-27 | Stop reason: HOSPADM

## 2023-06-25 RX ORDER — ONDANSETRON 4 MG/1
4 TABLET, ORALLY DISINTEGRATING ORAL EVERY 6 HOURS PRN
Status: DISCONTINUED | OUTPATIENT
Start: 2023-06-25 | End: 2023-06-27 | Stop reason: HOSPADM

## 2023-06-25 RX ORDER — POLYETHYLENE GLYCOL 3350 17 G/17G
17 POWDER, FOR SOLUTION ORAL DAILY PRN
Status: DISCONTINUED | OUTPATIENT
Start: 2023-06-25 | End: 2023-06-27 | Stop reason: HOSPADM

## 2023-06-25 RX ORDER — HYDRALAZINE HYDROCHLORIDE 20 MG/ML
10 INJECTION INTRAMUSCULAR; INTRAVENOUS EVERY 6 HOURS PRN
Status: DISCONTINUED | OUTPATIENT
Start: 2023-06-25 | End: 2023-06-26

## 2023-06-25 RX ORDER — LISINOPRIL 20 MG/1
20 TABLET ORAL DAILY
COMMUNITY
End: 2024-01-01

## 2023-06-25 RX ORDER — HYDRALAZINE HYDROCHLORIDE 20 MG/ML
10 INJECTION INTRAMUSCULAR; INTRAVENOUS EVERY 6 HOURS PRN
Status: DISCONTINUED | OUTPATIENT
Start: 2023-06-25 | End: 2023-06-25

## 2023-06-25 RX ORDER — ACETAMINOPHEN 650 MG/1
650 SUPPOSITORY RECTAL EVERY 6 HOURS PRN
Status: DISCONTINUED | OUTPATIENT
Start: 2023-06-25 | End: 2023-06-27 | Stop reason: HOSPADM

## 2023-06-25 RX ORDER — LABETALOL HYDROCHLORIDE 5 MG/ML
10 INJECTION, SOLUTION INTRAVENOUS ONCE
Status: COMPLETED | OUTPATIENT
Start: 2023-06-25 | End: 2023-06-25

## 2023-06-25 RX ORDER — LEVOTHYROXINE SODIUM 25 UG/1
25 TABLET ORAL
Status: DISCONTINUED | OUTPATIENT
Start: 2023-06-26 | End: 2023-06-27 | Stop reason: HOSPADM

## 2023-06-25 RX ADMIN — LABETALOL HYDROCHLORIDE 10 MG: 5 INJECTION, SOLUTION INTRAVENOUS at 21:20

## 2023-06-25 RX ADMIN — SODIUM CHLORIDE, POTASSIUM CHLORIDE, SODIUM LACTATE AND CALCIUM CHLORIDE 250 ML: 600; 310; 30; 20 INJECTION, SOLUTION INTRAVENOUS at 23:40

## 2023-06-25 RX ADMIN — SODIUM CHLORIDE 250 ML: 9 INJECTION, SOLUTION INTRAVENOUS at 19:40

## 2023-06-25 ASSESSMENT — ACTIVITIES OF DAILY LIVING (ADL)
ADLS_ACUITY_SCORE: 35

## 2023-06-25 NOTE — ED PROVIDER NOTES
EMERGENCY DEPARTMENT NOTE     Name: Alicia Guerrero    Age/Sex: 89 year old female   MRN: [id number]   Evaluation Date & Time:  6/25/2023  6:26 PM    PCP:    Annita Coker   ED Provider: Andrés Smith D.O.       CHIEF COMPLAINT    Generalized Weakness       DIAGNOSIS & DISPOSITION/MEDICAL DECISION MAKING     1. Weakness generalized    2. Anorexia        Alicia Guerrero is a 89 year old female with relevant past history of COVID-19 infection, generalized weakness, and hypothyroidism who presents to this ED by EMS for evaluation of generalized weakness and decreased appetite.    Differential  diagnosis for considered included but not limited to ventricular ischemic CVA, subdural, sepsis from multiple sources including pneumonia or urinary tract, electrolyte derangement, symptomatic anemia, hyperthyroidism, symptomatic anemia, deconditioning from recent illness    Medical Decision Making  CT of the head without abnormality, chest x-ray without evidence of pneumonia or congestive heart failure, urinalysis negative for evidence of infection, TSH mildly elevated with normal free T4, CBC and basic metabolic profile within normal limits my EKG nonischemic, troponin 26 without delta change.  Suspect deconditioning from recent COVID infection.  Patient will be admitted observation, continued IV fluid hydration, PT and OT consult for determination of disposition.  Case was discussed with the [place] resident  Interventions: IV fluids  Discharge Vital Signs:BP (!) 170/75   Pulse 94   Temp 98.7  F (37.1  C) (Oral)   Resp 24   Wt 59 kg (130 lb)   SpO2 92%   BMI 20.98 kg/m      DISPOSITION: To [facility] observation/PVS    Diagnostic studies:  Imaging:  XR Chest 1 View   Final Result   IMPRESSION: Elevated right hemidiaphragm. No pneumothorax or pleural effusion. No focal consolidation. Stable mildly enlarged cardiac silhouette with atherosclerotic vascular calcifications.      Head CT w/o contrast   Final Result    IMPRESSION:   1.  No CT evidence for acute intracranial process.   2.  Brain atrophy and presumed chronic microvascular ischemic changes as above.         Lab:  Labs Ordered and Resulted from Time of ED Arrival to Time of ED Departure   BASIC METABOLIC PANEL - Abnormal       Result Value    Sodium 134 (*)     Potassium 4.0      Chloride 99      Carbon Dioxide (CO2) 24      Anion Gap 11      Urea Nitrogen 21.7      Creatinine 0.71      Calcium 9.6      Glucose 136 (*)     GFR Estimate 80     HEPATIC FUNCTION PANEL - Abnormal    Protein Total 7.0      Albumin 3.4 (*)     Bilirubin Total 1.3 (*)     Alkaline Phosphatase 93      AST 29      ALT 19      Bilirubin Direct 0.42 (*)    TROPONIN T, HIGH SENSITIVITY - Abnormal    Troponin T, High Sensitivity 26 (*)    TSH WITH FREE T4 REFLEX - Abnormal    TSH 4.95 (*)    ROUTINE UA WITH MICROSCOPIC REFLEX TO CULTURE - Abnormal    Color Urine Yellow      Appearance Urine Clear      Glucose Urine Negative      Bilirubin Urine Negative      Ketones Urine 10 (*)     Specific Gravity Urine 1.026      Blood Urine Negative      pH Urine 5.5      Protein Albumin Urine 30 (*)     Urobilinogen Urine 3.0 (*)     Nitrite Urine Negative      Leukocyte Esterase Urine Negative      RBC Urine <1      WBC Urine <1      Squamous Epithelials Urine <1     INR - Abnormal    INR 2.63 (*)    LIPASE - Normal    Lipase 20     LACTIC ACID WHOLE BLOOD - Normal    Lactic Acid 1.0     T4 FREE - Normal    Free T4 1.28     CBC WITH PLATELETS AND DIFFERENTIAL    WBC Count 9.4      RBC Count 4.99      Hemoglobin 14.2      Hematocrit 42.7      MCV 86      MCH 28.5      MCHC 33.3      RDW 14.3      Platelet Count 245      % Neutrophils 79      % Lymphocytes 11      % Monocytes 10      % Eosinophils 0      % Basophils 0      % Immature Granulocytes 0      NRBCs per 100 WBC 0      Absolute Neutrophils 7.3      Absolute Lymphocytes 1.1      Absolute Monocytes 0.9      Absolute Eosinophils 0.0      Absolute  Basophils 0.0      Absolute Immature Granulocytes 0.0      Absolute NRBCs 0.0                 Triage note reviewed:The pt presents via [facility] for evaluation of generalized weakness/severe fatigue which has been ongoing for the past 2 weeks since being diagnosed with COVID. Decreased appetite, decreased fluid intake.      PTA.       History:    Supplemental history from: Documented in chart, if applicable    External Record(s) reviewed: Documented in chart, if applicable.    Work Up:    Chart documentation includes differential considered and any EKGs or imaging independently interpreted by provider, where specified.    In additional to work up documented, I considered the following work up: Documented in chart, if applicable.    External consultation:    Discussion of management with another provider: Documented in chart, if applicable    Complicating factors:    Care impacted by chronic illness: Anticoagulated State, Cancer/Chemotherapy, Cerebrovascular Disease, Chronic Kidney Disease, Diabetes and Hypertension    Care affected by social determinants of health: N/A    Disposition considerations: Admit.    At the conclusion of the encounter I discussed the results of all of the tests and the disposition. The questions were answered. The patient or family acknowledged understanding and was agreeable with the care plan.    TOTAL CRITICAL CARE TIME (EXCLUDING PROCEDURES): Not applicable    PROCEDURES:   None    EMERGENCY DEPARTMENT COURSE   6:54 PM I met with the patient to gather history and to perform my initial exam.  We discussed treatment options and the plan for care while in the Emergency Department.  9:19 PM Spoke with Dr. Bergstad, [place], to admit patient.    ED INTERVENTIONS     Medications   0.9% sodium chloride BOLUS (0 mLs Intravenous Stopped 6/25/23 2018)   labetalol (NORMODYNE/TRANDATE) injection 10 mg (10 mg Intravenous $Given 6/25/23 2120)       DISCHARGE MEDICATIONS        Review of your  medicines      UNREVIEWED medicines. Ask your doctor about these medicines      Dose / Directions   digoxin 250 MCG tablet  Commonly known as: LANOXIN      Dose: 250 mcg  Take 250 mcg by mouth daily.  Refills: 0              INFORMATION SOURCE AND LIMITATIONS    History/Exam limitations:   Patient information was obtained from: Patient's Family member  Use of : N/A    HISTORY OF PRESENT ILLNESS   Alicia Guerrero is a 89 year old year old female with a relevant past history of COVID-19 infection, generalized weakness, and hypothyroidism who presents to this ED by EMS for evaluation of generalized weakness and decreased appetite.    Per the patient's family member, since the patient was diagnosed with COVID earlier this month she has been having generalized weakness and severe fatigue. The patient has recently been living in an assisted living facility for the past month at level 1 care.     Yesterday (6/24/23), her weakness and fatigued had worsened to the point that she had difficulty rising from her bed. In addition, he had noticed that her baseline leg swelling had also gone down and this had concerned him as she may not have been drinking much and has had a decreased appetite. She has only been able to eat 2-3 bites of her meals and drinks little which is unusual. Starting around Friday 6/23/23 (about two days ago) the patient had also become incontinent of urine and has been unable to bathe and shower herself on a normal basis.    She has had no complaints of pain, recent hospitalizations, history of broken bones or recent surgeries. Patient is on warfarin currently as well as levothyroxine and lasix.  She is DNR. No other complaints at this time.    REVIEW OF SYSTEMS:   All other systems reviewed and are negative except as noted above in HPI.    PATIENT HISTORY   No past medical history on file.  Patient Active Problem List   Diagnosis     AA (alopecia areata)     History of skin cancer      Seborrheic dermatitis     Diffuse photodamage of skin     AK (actinic keratosis)     Dermatitis, seborrheic     Anorexia     Weakness generalized     Past Surgical History:   Procedure Laterality Date     NO HISTORY OF SURGERY  6/11/13    derm       Allergies   Allergen Reactions     Neomycin Unknown     Nickel Unknown       OUTPATIENT MEDICATIONS     New Prescriptions    No medications on file      Vitals:    06/25/23 1930 06/25/23 1945 06/25/23 2013 06/25/23 2015   BP: (!) 185/81 (!) 209/96 (!) 229/102 (!) 224/93   Pulse: 95 99 110 100   Resp: 26 27 27 28   Temp:       TempSrc:       SpO2: 94% 96% 94% 94%   Weight:           Physical Exam   Constitutional: Oriented to person, place, and time. Appears well-developed and well-nourished.   HEENT:    Head: Atraumatic.   Neck: Normal range of motion. Neck supple.   Cardiovascular: Normal rate, irregular rhythm. 2/6 systolic murmur.   Pulmonary/Chest: Normal effort  and breath sounds normal.   Abdominal: Soft. Bowel sounds are normal.   Musculoskeletal: Normal range of motion.   Neurological: Alert and oriented to person, place, and time. Normal strength. No sensory deficit. No cranial nerve deficit.  Skin: Skin is warm and dry.   Psychiatric: Normal mood and affect. Behavior is normal. Thought content normal.     DIAGNOSTICS    LABORATORY FINDINGS (REVIEWED AND INTERPRETED):  Labs Ordered and Resulted from Time of ED Arrival to Time of ED Departure   BASIC METABOLIC PANEL - Abnormal       Result Value    Sodium 134 (*)     Potassium 4.0      Chloride 99      Carbon Dioxide (CO2) 24      Anion Gap 11      Urea Nitrogen 21.7      Creatinine 0.71      Calcium 9.6      Glucose 136 (*)     GFR Estimate 80     HEPATIC FUNCTION PANEL - Abnormal    Protein Total 7.0      Albumin 3.4 (*)     Bilirubin Total 1.3 (*)     Alkaline Phosphatase 93      AST 29      ALT 19      Bilirubin Direct 0.42 (*)    TROPONIN T, HIGH SENSITIVITY - Abnormal    Troponin T, High Sensitivity 26 (*)     TSH WITH FREE T4 REFLEX - Abnormal    TSH 4.95 (*)    ROUTINE UA WITH MICROSCOPIC REFLEX TO CULTURE - Abnormal    Color Urine Yellow      Appearance Urine Clear      Glucose Urine Negative      Bilirubin Urine Negative      Ketones Urine 10 (*)     Specific Gravity Urine 1.026      Blood Urine Negative      pH Urine 5.5      Protein Albumin Urine 30 (*)     Urobilinogen Urine 3.0 (*)     Nitrite Urine Negative      Leukocyte Esterase Urine Negative      RBC Urine <1      WBC Urine <1      Squamous Epithelials Urine <1     INR - Abnormal    INR 2.63 (*)    LIPASE - Normal    Lipase 20     LACTIC ACID WHOLE BLOOD - Normal    Lactic Acid 1.0     T4 FREE - Normal    Free T4 1.28     CBC WITH PLATELETS AND DIFFERENTIAL    WBC Count 9.4      RBC Count 4.99      Hemoglobin 14.2      Hematocrit 42.7      MCV 86      MCH 28.5      MCHC 33.3      RDW 14.3      Platelet Count 245      % Neutrophils 79      % Lymphocytes 11      % Monocytes 10      % Eosinophils 0      % Basophils 0      % Immature Granulocytes 0      NRBCs per 100 WBC 0      Absolute Neutrophils 7.3      Absolute Lymphocytes 1.1      Absolute Monocytes 0.9      Absolute Eosinophils 0.0      Absolute Basophils 0.0      Absolute Immature Granulocytes 0.0      Absolute NRBCs 0.0           IMAGING (REVIEWED AND INTERPRETED):  XR Chest 1 View   Final Result   IMPRESSION: Elevated right hemidiaphragm. No pneumothorax or pleural effusion. No focal consolidation. Stable mildly enlarged cardiac silhouette with atherosclerotic vascular calcifications.      Head CT w/o contrast   Final Result   IMPRESSION:   1.  No CT evidence for acute intracranial process.   2.  Brain atrophy and presumed chronic microvascular ischemic changes as above.          I, Cassidy Molina , am serving as a scribe to document services personally performed by Andrés Smith D.O., based on my observation and the provider s statements to me.    I, Andrés Smith D.O., attest that Cassidy  Tracy  is acting in a scribe capacity, has observed my performance of the services and has documented them in accordance with my direction.    Andrés Smith D.O.  EMERGENCY MEDICINE   06/25/23  [facility] EMERGENCY DEPARTMENT  [address] [place] [id number]  [phone]  Dept: [phone]     Andrés Smith DO  06/26/23 0259

## 2023-06-25 NOTE — ED NOTES
Bed: JNED-09  Expected date: 6/25/23  Expected time: 6:16 PM  Means of arrival: Ambulance  Comments:  89F Weakness; covid 2 weeks ago; /92 dehyration

## 2023-06-25 NOTE — ED TRIAGE NOTES
The pt presents via [facility] for evaluation of generalized weakness/severe fatigue which has been ongoing for the past 2 weeks since being diagnosed with COVID. Decreased appetite, decreased fluid intake.      PTA.

## 2023-06-26 ENCOUNTER — APPOINTMENT (OUTPATIENT)
Dept: CARDIOLOGY | Facility: HOSPITAL | Age: 88
End: 2023-06-26
Payer: COMMERCIAL

## 2023-06-26 ENCOUNTER — APPOINTMENT (OUTPATIENT)
Dept: PHYSICAL THERAPY | Facility: HOSPITAL | Age: 88
End: 2023-06-26
Payer: COMMERCIAL

## 2023-06-26 ENCOUNTER — APPOINTMENT (OUTPATIENT)
Dept: OCCUPATIONAL THERAPY | Facility: HOSPITAL | Age: 88
End: 2023-06-26
Payer: COMMERCIAL

## 2023-06-26 LAB
ANION GAP SERPL CALCULATED.3IONS-SCNC: 9 MMOL/L (ref 7–15)
BUN SERPL-MCNC: 17.8 MG/DL (ref 8–23)
CALCIUM SERPL-MCNC: 9 MG/DL (ref 8.2–9.6)
CHLORIDE SERPL-SCNC: 101 MMOL/L (ref 98–107)
CREAT SERPL-MCNC: 0.71 MG/DL (ref 0.51–0.95)
DEPRECATED HCO3 PLAS-SCNC: 25 MMOL/L (ref 22–29)
ERYTHROCYTE [DISTWIDTH] IN BLOOD BY AUTOMATED COUNT: 14.1 % (ref 10–15)
GFR SERPL CREATININE-BSD FRML MDRD: 80 ML/MIN/1.73M2
GLUCOSE BLDC GLUCOMTR-MCNC: 107 MG/DL (ref 70–99)
GLUCOSE BLDC GLUCOMTR-MCNC: 108 MG/DL (ref 70–99)
GLUCOSE BLDC GLUCOMTR-MCNC: 113 MG/DL (ref 70–99)
GLUCOSE BLDC GLUCOMTR-MCNC: 126 MG/DL (ref 70–99)
GLUCOSE BLDC GLUCOMTR-MCNC: 129 MG/DL (ref 70–99)
GLUCOSE BLDC GLUCOMTR-MCNC: 158 MG/DL (ref 70–99)
GLUCOSE SERPL-MCNC: 117 MG/DL (ref 70–99)
HCT VFR BLD AUTO: 39.2 % (ref 35–47)
HGB BLD-MCNC: 12.8 G/DL (ref 11.7–15.7)
INR PPP: 3.14 (ref 0.85–1.15)
LVEF ECHO: NORMAL
MAGNESIUM SERPL-MCNC: 1.8 MG/DL (ref 1.7–2.3)
MCH RBC QN AUTO: 28.4 PG (ref 26.5–33)
MCHC RBC AUTO-ENTMCNC: 32.7 G/DL (ref 31.5–36.5)
MCV RBC AUTO: 87 FL (ref 78–100)
PLATELET # BLD AUTO: 208 10E3/UL (ref 150–450)
POTASSIUM SERPL-SCNC: 4 MMOL/L (ref 3.4–5.3)
RBC # BLD AUTO: 4.5 10E6/UL (ref 3.8–5.2)
SODIUM SERPL-SCNC: 135 MMOL/L (ref 136–145)
TROPONIN T SERPL HS-MCNC: 26 NG/L
WBC # BLD AUTO: 7.1 10E3/UL (ref 4–11)

## 2023-06-26 PROCEDURE — 82962 GLUCOSE BLOOD TEST: CPT

## 2023-06-26 PROCEDURE — G0378 HOSPITAL OBSERVATION PER HR: HCPCS

## 2023-06-26 PROCEDURE — 93306 TTE W/DOPPLER COMPLETE: CPT

## 2023-06-26 PROCEDURE — 97166 OT EVAL MOD COMPLEX 45 MIN: CPT | Mod: GO

## 2023-06-26 PROCEDURE — 97161 PT EVAL LOW COMPLEX 20 MIN: CPT | Mod: GP

## 2023-06-26 PROCEDURE — 36415 COLL VENOUS BLD VENIPUNCTURE: CPT

## 2023-06-26 PROCEDURE — 96361 HYDRATE IV INFUSION ADD-ON: CPT

## 2023-06-26 PROCEDURE — 258N000003 HC RX IP 258 OP 636

## 2023-06-26 PROCEDURE — 93306 TTE W/DOPPLER COMPLETE: CPT | Mod: 26 | Performed by: INTERNAL MEDICINE

## 2023-06-26 PROCEDURE — 82310 ASSAY OF CALCIUM: CPT

## 2023-06-26 PROCEDURE — 97530 THERAPEUTIC ACTIVITIES: CPT | Mod: GP

## 2023-06-26 PROCEDURE — 99222 1ST HOSP IP/OBS MODERATE 55: CPT | Mod: GC

## 2023-06-26 PROCEDURE — 85610 PROTHROMBIN TIME: CPT

## 2023-06-26 PROCEDURE — 97535 SELF CARE MNGMENT TRAINING: CPT | Mod: GO

## 2023-06-26 PROCEDURE — 250N000013 HC RX MED GY IP 250 OP 250 PS 637

## 2023-06-26 PROCEDURE — 85027 COMPLETE CBC AUTOMATED: CPT

## 2023-06-26 RX ORDER — HYDRALAZINE HYDROCHLORIDE 20 MG/ML
5 INJECTION INTRAMUSCULAR; INTRAVENOUS EVERY 6 HOURS PRN
Status: DISCONTINUED | OUTPATIENT
Start: 2023-06-26 | End: 2023-06-27 | Stop reason: HOSPADM

## 2023-06-26 RX ORDER — SODIUM CHLORIDE 9 MG/ML
INJECTION, SOLUTION INTRAVENOUS CONTINUOUS
Status: DISCONTINUED | OUTPATIENT
Start: 2023-06-26 | End: 2023-06-27

## 2023-06-26 RX ADMIN — DIGOXIN 250 MCG: 125 TABLET ORAL at 08:04

## 2023-06-26 RX ADMIN — LISINOPRIL 20 MG: 20 TABLET ORAL at 08:04

## 2023-06-26 RX ADMIN — SODIUM CHLORIDE: 9 INJECTION, SOLUTION INTRAVENOUS at 14:37

## 2023-06-26 RX ADMIN — SODIUM CHLORIDE, POTASSIUM CHLORIDE, SODIUM LACTATE AND CALCIUM CHLORIDE: 600; 310; 30; 20 INJECTION, SOLUTION INTRAVENOUS at 00:03

## 2023-06-26 RX ADMIN — LEVOTHYROXINE SODIUM 25 MCG: 0.03 TABLET ORAL at 08:03

## 2023-06-26 ASSESSMENT — ACTIVITIES OF DAILY LIVING (ADL)
ADLS_ACUITY_SCORE: 37
ADLS_ACUITY_SCORE: 43
ADLS_ACUITY_SCORE: 35
ADLS_ACUITY_SCORE: 37
ADLS_ACUITY_SCORE: 35
DEPENDENT_IADLS:: CLEANING;COOKING;LAUNDRY;SHOPPING;MEAL PREPARATION;MEDICATION MANAGEMENT;MONEY MANAGEMENT;TRANSPORTATION;INCONTINENCE
ADLS_ACUITY_SCORE: 43
ADLS_ACUITY_SCORE: 43
ADLS_ACUITY_SCORE: 37
ADLS_ACUITY_SCORE: 43
ADLS_ACUITY_SCORE: 35
ADLS_ACUITY_SCORE: 35
ADLS_ACUITY_SCORE: 43

## 2023-06-26 NOTE — PHARMACY-ADMISSION MEDICATION HISTORY
Pharmacist Admission Medication History    Admission medication history is complete. The information provided in this note is only as accurate as the sources available at the time of the update.    Medication reconciliation/reorder completed by provider prior to medication history? No    Information Source(s): Family member and CareEverywhere/SureScripts via in-person    Pertinent Information: son sets up pills - warfarin as mixed up when not able to set up when pt was in covid quarantine     Changes made to PTA medication list:    Added: lisinopril    Deleted: otcs, topicals, hctx, lasix, metoprolol, crestor    Changed: warfarin    Medication Affordability:  Not including over the counter (OTC) medications, was there a time in the past 3 months when you did not take your medications as prescribed because of cost?: No    Allergies reviewed with patient and updates made in EHR: yes    Medication History Completed By: Carole Baldwin RPH 6/25/2023 9:32 PM    Prior to Admission medications    Medication Sig Last Dose Taking? Auth Provider Long Term End Date   digoxin (LANOXIN) 0.25 MG tablet Take 250 mcg by mouth daily. 6/25/2023 at am Yes Reported, Patient     levothyroxine (SYNTHROID/LEVOTHROID) 25 MCG tablet Take 25 mcg by mouth daily before breakfast 6/25/2023 at am Yes Unknown, Entered By History Yes    lisinopril (ZESTRIL) 20 MG tablet Take 20 mg by mouth daily 6/25/2023 at am Yes Unknown, Entered By History Yes    warfarin ANTICOAGULANT (COUMADIN) 5 MG tablet Take 2.5-5 mg by mouth daily Takes in the morning at home.  Was taking 2.5 mg (1/2 tablet) Mon, Fri (two days weekly) and 5 mg on the other days (5 days weekly) but INR was high so held 6/21/23 dose then was to take 2.5 mg daily until 6/27/23 recheck 6/25/2023 at 2.5 mg in am Yes Unknown, Entered By History

## 2023-06-26 NOTE — H&P
[facility]    History and Physical - Hospitalist Service       Date of Admission:  6/25/2023    Assessment & Plan      Alicia Coates is a 89 year old female admitted on 6/25/2023. She has a history of dementia, pAF (on warfarin), hypertension, hypothyroidism, T2DM and presents with generalized weakness and poor PO intake since COVID infection in early June.     Generalized weakness   Fatigue  Presents with worsening generalized weakness, fatigue, and PO intake. Patient tested positive for COVID on 6/8 and underwent a 10-day quarantine at her new [facility]. Since then, she has had progressive weakness and is now unable to rise from a lying or seated position. She has been sleeping much more than usual and eating/drinking very little. Vitals significant for hypertension. Exam with dry mucous membranes, systolic murmur. Labs largely unremarkable, including CBC, BMP, LFTs, lipase, lactate, INR, UA. Troponin 26. TSH mildly elevated 4.95 with T4 wnl. CXR clear. CT head negative for acute findings. At this time, seems most likely that patient is slow to recover from recent COVID infection given age, dementia, and new environment at [facility]. Depression also possible etiology, particularly given recent move to [facility] and social isolation during COVID quarantine. Differential also includes dehydration, progression of dementia, digoxin toxicity/adverse effect, subclinical hypothyroidism, electrolyte derangement (though work-up thus far normal), recent CVA (though reassuring neuro exam).    - PT/OT  - Magnesium lab   - Digoxin level   - Daily BMP, CBC  - Consider further mental health discussion/assessment     Anorexia    Dehydration, mild   Protein-calorie malnutrition  Unintentional weight loss   Per son, patient has had very poor PO intake of solids and liquids since she got COVID at the beginning of June. He noticed that her chronic significant bilateral lower extremity edema almost completely resolved,  "which made him worried that she is dehydrated. Weight is 130 lbs (unclear if this is per patient report or actual measurement), down from 150 lbs in 12/2022. VSS. Exam with dry mucous membranes. Labs without evidence of dehydration - normal electrolytes, renal function. Does have some ketones in her urine. Albumin mildly low 3.4. Does not appear overly dehydrated; however, given history of poor oral intake and dry mucous membranes, will give IVF overnight and assess response.   - S/p 500 mL bolus   - IVF: LR at 100 ml/h   - Measure weight   - Encourage PO intake   - Monitor I/Os  - Daily BMP, CBC     Hypertension   History of hypertension. On lisinopril 20 mg daily PTA. -220s/80-90s. No evidence of end organ dysfunction. Received 10 mg IV labetolol in ED.   - PTA lisinopril 20 mg daily   - IV hydralazine PRN for SBP > 200   - Vitals q4h     Systolic murmur  Exam with 3/6 systolic murmur, loudest at RUSB with radiation to carotid, also heard at LUSB and LLSB. Likely aortic stenosis. Possible that this could be contributing to her symptoms of generalized weakness, fatigue.   - Consider echocardiogram     Atrial fibrillation   History of afib. On digoxin and warfarin. INR 2.6. Telemetry with a fib, rate 80s-90s.   - PTA warfarin, pharmacy to dose   - PTA digoxin 250 mcg daily   - Digoxin level   - Daily INR     Hypothyroidism   History of hypothyroidism. TSH elevated 4.95, free T4 wnl at 1.28.   - PTA levothyroxine 25 mcg daily     Type 2 diabetes mellitus   History of T2DM without long-term use of insulin. PTA medications: none. Most recent A1C 6.7% in 12/2022; now 8.4%.  - POC glucose checks QID   - Medium resistance SSI     Discharge planning  Patient moved into [facility] in May 2023. She was living independently prior. Primary decision makers are son Scott and daughter Cristina. Patient is currently receiving \"level one assistance\" at [facility], which is the most basic option and includes " meals, well checks, medication management. Likely needs higher level of care.   - PT/OT/CM        Diet: Regular Diet Adult  DVT Prophylaxis: Warfarin  Elena Catheter: Not present  Fluids: LR at 100 ml/h   Lines: None     Cardiac Monitoring: None  Code Status: No CPR- Do NOT Intubate    Clinically Significant Risk Factors Present on Admission              # Hypoalbuminemia: Lowest albumin = 3.4 g/dL at 6/25/2023  6:41 PM, will monitor as appropriate  # Drug Induced Coagulation Defect: home medication list includes an anticoagulant medication    # Hypertension: Noted on problem list     # DMII: A1C = 8.4 % (Ref range: <5.7 %) within past 6 months             Disposition Plan      Expected Discharge Date: 06/26/2023                The patient's care was discussed with the Attending Physician, Dr. Mcnulty, at morning report 6/26.       Marilyn Diego MD  Hospitalist Service  [facility]  Securely message with [employer] (more info)  Text page via [facility] Paging/Directory   ______________________________________________________________________    Chief Complaint   Generalized weakness, poor PO intake    History is obtained from the patient and her son Scott     History of Present Illness   Alicia Coates is a 89 year old female admitted on 6/25/2023. She has a history of dementia, pAF (on warfarin), hypertension, hypothyroidism, T2DM and presents with generalized weakness and poor PO intake since COVID infection in early June.     Patient states that she does not know why she is in the hospital tonight.  She does not remember getting COVID a couple weeks ago.  She states that she is currently comfortable and has no complaints.  She denies pain, fever/chills, weakness.  She states she just feels tired.    Patient's son Scott is at bedside.  He reports that he brought patient to the emergency room today because she has been progressively weak over the last few days.  Patient is normally able to  ambulate well with a walker.  However, today patient was unable to rise from the seat or sit up in bed due to weakness.    Scott states that Alicia moved into [facility] assisted living on 2023.  She had been living independently prior to that.  She decided to move into assisted living because she was having a hard time with the stairs in her home, and she was feeling lonely and like the idea of living with other seniors.  She has had progressive memory impairment for the last year or 2 after her   in .  Moving into the assisted living was understandably hard adjustment, but patient was going to the dining room for meals and trying to socialize.    About 3 weeks after moving into assisted living, patient unfortunately tested positive for COVID on .  She had to do a 10-day quarantine.  It sounds like she tolerated the virus well; she had congestion and cough as well as fatigue, but no respiratory distress.    On , the day after her quarantine ended, Scott visited her in the assisted living.  He noticed that patient's chronic significant lower extremity edema had resolved completely.  This made him worried that she was not getting enough to drink during her quarantine.  He also noticed that she had not been eating much and missed a couple days of her medications.    Ever since her COVID quarantine, patient has seemed to have lost interest in eating or drinking.  She states that she gets full after a couple small bites.  She is also not going to the dining room to eat meals.  Patient is sleeping/napping most of the day for the last couple days.  She had an episode of urinary incontinence on the sofa during a nap yesterday, which was new for her.      Additionally, she has been very weak.  Again, she is normally able to ambulate with a walker.  The last couple days, she has gotten weaker and weaker.  Today, she was unable to stand from his seat independently, and she was unable to sit up from  lying down in bed.  She was able to walk with a walker once she was assisted to a standing position.    Patient reports no pain, constipation, diarrhea, lightheadedness, dizziness, headache, vision changes, cough, or respiratory symptoms.      History of mental health issues.  Scott notes that patient had a similar episode a few years ago, which resolved on its own with time.    Again, patient lives at [facility].  She is currently receiving Level One cares, which is the minimum amount of cares.       Past Medical History    Past Medical History:   Diagnosis Date     Chronic atrial fibrillation (H)      CKD (chronic kidney disease)     Stage 3a     History of skin cancer      HLD (hyperlipidemia)      HTN (hypertension)      Hypothyroidism      T2DM (type 2 diabetes mellitus) (H)        Past Surgical History   Past Surgical History:   Procedure Laterality Date     BUNIONECTOMY       MOHS MICROGRAPHIC PROCEDURE       NO HISTORY OF SURGERY  06/11/2013    derm       Prior to Admission Medications   Prior to Admission Medications   Prescriptions Last Dose Informant Patient Reported? Taking?   digoxin (LANOXIN) 0.25 MG tablet 6/25/2023 at am  Yes Yes   Sig: Take 250 mcg by mouth daily.   levothyroxine (SYNTHROID/LEVOTHROID) 25 MCG tablet 6/25/2023 at am  Yes Yes   Sig: Take 25 mcg by mouth daily before breakfast   lisinopril (ZESTRIL) 20 MG tablet 6/25/2023 at am  Yes Yes   Sig: Take 20 mg by mouth daily   warfarin ANTICOAGULANT (COUMADIN) 5 MG tablet 6/25/2023 at 2.5 mg in am  Yes Yes   Sig: Take 2.5-5 mg by mouth daily Takes in the morning at home.  Was taking 2.5 mg (1/2 tablet) Mon, Fri (two days weekly) and 5 mg on the other days (5 days weekly) but INR was high so held 6/21/23 dose then was to take 2.5 mg daily until 6/27/23 recheck      Facility-Administered Medications: None           Physical Exam   Vital Signs: Temp: 98.7  F (37.1  C) Temp src: Oral BP: (!) 226/98 Pulse: 88   Resp: 25  SpO2: 100 % O2 Device: Nasal cannula Oxygen Delivery: 1 LPM  Weight: 130 lbs 0 oz    GEN: Pleasant female, laying in bed, in no acute distress.   HEENT: Normocephalic, atraumatic. Extraoccular eye movements intact. Anicteric sclera. Dry oral mucous membranes.   NECK: Supple. No cervical or supraclavicular adenopathy.   PULM: Non-labored breathing. No use of accessory muscles. Clear to ausculation bilaterally. No wheezes or crackles.   CV: Irregularly irregular rhythm, rate in 80s. 3/5 systolic murmur loudest at RUSB and radiating to right carotid, also present at LUSB and LLSB.   ABDOMEN: Normoactive bowel sounds. Non-tender to palpation. Non-distended.   EXTREMITES:  No clubbing, cyanosis. Mild bilateral lower extremity edema to ankles, much less than baseline per son.  NEURO:  Awake. Oriented to person, place. Not oriented to time or situation. Cranial nerves 2-12 grossly intact. Moving all extremities. 4/5 strength in bilateral arms and legs.   PSYCH: Calm. Appropriate affect.     Data   ------------------------- PAST 24 HR DATA REVIEWED -----------------------------------------------    I have personally reviewed the following data over the past 24 hrs:    9.4  \   14.2   / 245     134 (L) 99 21.7 /  136 (H)   4.0 24 0.71 \       ALT: 19 AST: 29 AP: 93 TBILI: 1.3 (H)   ALB: 3.4 (L) TOT PROTEIN: 7.0 LIPASE: 20       Trop: 26 (H) BNP: N/A       TSH: 4.95 (H) T4: 1.28 A1C: 8.4 (H)       Procal: N/A CRP: N/A Lactic Acid: 1.0       INR:  2.63 (H) PTT:  N/A   D-dimer:  N/A Fibrinogen:  N/A       Imaging results reviewed over the past 24 hrs:   Recent Results (from the past 24 hour(s))   Head CT w/o contrast    Narrative    EXAM: CT HEAD W/O CONTRAST  LOCATION: [facility]  DATE: 6/25/2023    INDICATION: Weakness, hypertension on warfarin  COMPARISON: None.  TECHNIQUE: Routine CT Head without IV contrast. Multiplanar reformats. Dose reduction techniques were used.    FINDINGS:  INTRACRANIAL  CONTENTS: No intracranial hemorrhage, extraaxial collection, or mass effect.  No CT evidence of acute infarct. Mild to moderate presumed chronic small vessel ischemic changes. Chronic bilateral caudate lacunar infarcts. Mild to moderate   generalized volume loss. No hydrocephalus.     VISUALIZED ORBITS/SINUSES/MASTOIDS: Prior bilateral cataract surgery. Visualized portions of the orbits are otherwise unremarkable. No significant paranasal sinus mucosal disease. No middle ear or mastoid effusion.    BONES/SOFT TISSUES: No acute abnormality.      Impression    IMPRESSION:  1.  No CT evidence for acute intracranial process.  2.  Brain atrophy and presumed chronic microvascular ischemic changes as above.   XR Chest 1 View    Narrative    EXAM: XR CHEST 1 VIEW  LOCATION: [facility]  DATE: 6/25/2023    INDICATION: weaknees evaluatate for pneumonia  COMPARISON: 07/09/2021      Impression    IMPRESSION: Elevated right hemidiaphragm. No pneumothorax or pleural effusion. No focal consolidation. Stable mildly enlarged cardiac silhouette with atherosclerotic vascular calcifications.

## 2023-06-26 NOTE — PROGRESS NOTES
06/26/23 0852   Appointment Info   Signing Clinician's Name / Credentials (PT) Marilyn Coker DPT   Quick Adds   Quick Adds Certification   Living Environment   People in Home facility resident   Current Living Arrangements assisted living   Home Accessibility no concerns   Transportation Anticipated family or friend will provide;health plan transportation   Self-Care   Usual Activity Tolerance good   Current Activity Tolerance fair   Equipment Currently Used at Home walker, rolling   Activity/Exercise/Self-Care Comment Per chart FELIPE services include meals, well checks, medication management.   General Information   Onset of Illness/Injury or Date of Surgery 06/25/23   Referring Physician Marilyn Diego MD   Patient/Family Therapy Goals Statement (PT) none   Pertinent History of Current Problem (include personal factors and/or comorbidities that impact the POC) 89 year old female admitted on 6/25/2023. She has a history of dementia, pAF (on warfarin), hypertension, hypothyroidism, T2DM and presents with generalized weakness and poor PO intake since COVID infection in early June.   Existing Precautions/Restrictions fall   Strength (Manual Muscle Testing)   Strength (Manual Muscle Testing) Deficits observed during functional mobility   Bed Mobility   Bed Mobility supine-sit;sit-supine   Supine-Sit George (Bed Mobility) moderate assist (50% patient effort);2 person assist   Sit-Supine George (Bed Mobility) minimum assist (75% patient effort)   Bed Mobility Limitations decreased ability to use arms for pushing/pulling;decreased ability to use legs for bridging/pushing;impaired ability to control trunk for mobility   Assistive Device (Bed Mobility) bed rails;draw sheet   Transfers   Transfers sit-stand transfer   Sit-Stand Transfer   Sit-Stand George (Transfers) minimum assist (75% patient effort);2 person assist   Assistive Device (Sit-Stand Transfers) walker, front-wheeled   Gait/Stairs  (Locomotion)   [place] Level (Gait) minimum assist (75% patient effort)   Assistive Device (Gait) walker, front-wheeled   Distance in Feet 15'   Pattern (Gait) step-through   Deviations/Abnormal Patterns (Gait) gait speed decreased   Clinical Impression   Criteria for Skilled Therapeutic Intervention Yes, treatment indicated   PT Diagnosis (PT) Impaired functional mobility   Influenced by the following impairments Weakness, fatigue   Functional limitations due to impairments Impaired strength, transfers, gait   Clinical Presentation (PT Evaluation Complexity) Stable/Uncomplicated   Clinical Presentation Rationale Presents as diagnosed   Clinical Decision Making (Complexity) low complexity   Planned Therapy Interventions (PT) balance training;bed mobility training;gait training;home exercise program;strengthening;transfer training   Anticipated Equipment Needs at Discharge (PT) walker, rolling   Risk & Benefits of therapy have been explained patient   PT Total Evaluation Time   PT Naa Low Complexity Minutes (04469) 10   Therapy Certification   Start of care date 06/26/23   Certification date from 06/26/23   Certification date to 07/03/23   Medical Diagnosis Gen. weakness, anorexia   Physical Therapy Goals   PT Frequency Daily   PT Predicted Duration/Target Date for Goal Attainment 07/03/23   PT Goals Bed Mobility;Transfers;Gait   PT: Bed Mobility Independent;Supine to/from sit   PT: Transfers Supervision/stand-by assist;Sit to/from stand;Bed to/from chair;Assistive device   PT: Gait Supervision/stand-by assist;Rolling walker;150 feet   PT Discharge Planning   PT Plan progress transfers, amb with chair followKRISTEN ex   PT Discharge Recommendation (DC Rec) Transitional Care Facility   PT Rationale for DC Rec Ax1-2 for mobility.   PT Brief overview of current status Amb 15'x2 with FWW and min A.         [facility]  OUTPATIENT PHYSICAL THERAPY EVALUATION  PLAN OF TREATMENT FOR  OUTPATIENT REHABILITATION  (COMPLETE FOR INITIAL CLAIMS ONLY)  Patient's Last Name, First Name, M.I.  YOB: 1932  Alicia Coates                        Provider's Name  [facility] Medical Record No.  [id number]                             Onset Date:  06/25/23   Start of Care Date:  (P) 06/26/23   Type:     _X_PT   ___OT   ___SLP Medical Diagnosis:  (P) Gen. weakness, anorexia              PT Diagnosis:  (P) Impaired functional mobility Visits from SOC:  1     See note for plan of treatment, functional goals and certification details    I CERTIFY THE NEED FOR THESE SERVICES FURNISHED UNDER        THIS PLAN OF TREATMENT AND WHILE UNDER MY CARE     (Physician co-signature of this document indicates review and certification of the therapy plan).                Marilyn Coker, PT, DPT  6/26/2023

## 2023-06-26 NOTE — CONSULTS
Care Management Initial Consult    General Information  Assessment completed with: Andree son Scott  Type of CM/SW Visit: Initial Assessment    Primary Care Provider verified and updated as needed: Yes   Readmission within the last 30 days: no previous admission in last 30 days      Reason for Consult: discharge planning  Advance Care Planning: Advance Care Planning Reviewed: no concerns identified          Communication Assessment  Patient's communication style: spoken language (English or Bilingual)             Cognitive  Cognitive/Neuro/Behavioral: .WDL except, orientation  Level of Consciousness: confused  Arousal Level: opens eyes spontaneously  Orientation: disoriented to, place, time  Mood/Behavior: calm, cooperative  Best Language: 0 - No aphasia  Speech: clear    Living Environment:   People in home: facility resident     Current living Arrangements: assisted living  Name of Facility: [facility]   Able to return to prior arrangements: other (see comments) (TBD)       Family/Social Support:  Care provided by: self, other (see comments) (facility staff)  Provides care for: no one  Marital Status:   Children, Facility resident(s)/Staff          Description of Support System: Supportive, Involved    Support Assessment: Adequate family and caregiver support    Current Resources:   Patient receiving home care services: No     Community Resources: Skilled Nursing Facility ([facility])  Equipment currently used at home: walker, rolling, grab bar, toilet, grab bar, tub/shower, shower chair  Supplies currently used at home: Incontinence Supplies    Employment/Financial:  Employment Status:          Financial Concerns:             Does the patient's insurance plan have a 3 day qualifying hospital stay waiver?     Will the waiver be used for post-acute placement?      Lifestyle & Psychosocial Needs:  Social Determinants of Health     Tobacco Use: Low Risk  (6/25/2023)    Patient History      Smoking  "Tobacco Use: Never      Smokeless Tobacco Use: Never      Passive Exposure: Not on file   Alcohol Use: Not on file   Financial Resource Strain: Not on file   Food Insecurity: Not on file   Transportation Needs: Not on file   Physical Activity: Not on file   Stress: Not on file   Social Connections: Not on file   Intimate Partner Violence: Not on file   Depression: Not on file   Housing Stability: Not on file       Functional Status:  Prior to admission patient needed assistance:   Dependent ADLs:: Ambulation-walker, Bathing, Dressing, Incontinence, Transfers  Dependent IADLs:: Cleaning, Cooking, Laundry, Shopping, Meal Preparation, Medication Management, Money Management, Transportation, Incontinence  Assesssment of Functional Status: Not at baseline with ADL Functioning, Not at baseline with mobility, Not at  functional baseline    Mental Health Status:          Chemical Dependency Status:                Values/Beliefs:  Spiritual, Cultural Beliefs, Confucianism Practices, Values that affect care:                 Additional Information:  Spoke with son Scott on the phone, introduced self and care management role. Patient lives at [facility]. Scott states that currently she is on a \"level one\" service at the facility. Receives assist with meals, housekeeping, laundry, safety checks. Scott reports that patient moved into the facility one month ago and approx 2 weeks after moving in, tested positive for COVID. Scott states that she was \"put into a 10 day lock down\" and was quarantined to her room. He was not able to visit her and when he did, he found that she appeared thinner, was sleeping 16-20 hours and was incontinent, which was a big change.   Discussed with Scott that PT/OT are recommending TCU after discharge. He is in agreement with this plan and requested facilities around the [facility]. Patient is currently observation. DANIELITO discussed. Scott understands that we will wait for the facility to run her " medicare insurance for authorization.   TCU referrals sent to:  [facility]    CM to follow hospital progression, continue to assist with discharge planning.     Swati Rod RN

## 2023-06-26 NOTE — PLAN OF CARE
Problem: Plan of Care - These are the overarching goals to be used throughout the patient stay.    Goal: Absence of Hospital-Acquired Illness or Injury  Outcome: Progressing  Intervention: Identify and Manage Fall Risk  Recent Flowsheet Documentation  Taken 6/26/2023 1220 by Laura Schmid RN  Safety Promotion/Fall Prevention: assistive device/personal items within reach  Intervention: Prevent Skin Injury  Recent Flowsheet Documentation  Taken 6/26/2023 1220 by Laura Schmid RN  Body Position: position changed independently  Intervention: Prevent and Manage VTE (Venous Thromboembolism) Risk  Recent Flowsheet Documentation  Taken 6/26/2023 1220 by Laura Schmid RN  VTE Prevention/Management: SCDs (sequential compression devices) off   Goal Outcome Evaluation:       Received pt around 1220 pm from ED. Pt disoriented to time. Pt reports no pain. Poor appetite. Pt assist of 1 with walker and uses the commode. Lower extremity edema. NS running at 50ml/hr. Pts son at bed side.        Laura Schmid RN

## 2023-06-26 NOTE — PROGRESS NOTES
Report called to P1 LYNDON Sanchez. Pt stable,  transferred to room 105. No new concerns this morning. Oriented to person, disoriented to place and time. History of dementia. Able to ambulate to bathroom with 1A

## 2023-06-26 NOTE — PROGRESS NOTES
"   06/26/23 0836   Appointment Info   Signing Clinician's Name / Credentials (OT) Iman Gao OTR/L OTD   Quick Adds   Quick Adds Certification   Living Environment   People in Home facility resident   Current Living Arrangements assisted living   Home Accessibility no concerns   Living Environment Comments Recently moved to long-term - reports walk in shower with shower bench, standard toilet with grab bars, pt demos questionable history giving   Self-Care   Activity/Exercise/Self-Care Comment Per H&P - long-term services include assist with meals, medications, and daily wellness checks. Pt reports ind with all ADLs   General Information   Onset of Illness/Injury or Date of Surgery 06/25/23   Referring Physician Marilyn Diego MD   Patient/Family Therapy Goal Statement (OT) To rest   Additional Occupational Profile Info/Pertinent History of Current Problem Alicia Coates is a 89 year old female admitted on 6/25/2023. She has a history of dementia, pAF (on warfarin), hypertension, hypothyroidism, T2DM and presents with generalized weakness and poor PO intake since COVID infection in early June.   Existing Precautions/Restrictions fall   Limitations/Impairments safety/cognitive   Cognitive Status Examination   Orientation Status person  (Able to state \"hospital\" - unable to name hospital)   Affect/Mental Status (Cognitive) confused   Follows Commands WFL   Memory Deficit minimal deficit   Cognitive Status Comments Pt demos some confusion and memory deficits - does not know why she is in the hospital   Visual Perception   Visual Impairment/Limitations corrective lenses for reading   Posture   Posture forward head position   Range of Motion Comprehensive   General Range of Motion bilateral upper extremity ROM WFL   Strength Comprehensive (MMT)   General Manual Muscle Testing (MMT) Assessment upper extremity strength deficits identified   Comment, General Manual Muscle Testing (MMT) Assessment Generalized weakness   Bed " Mobility   Bed Mobility supine-sit   Supine-Sit [place] (Bed Mobility) moderate assist (50% patient effort);2 person assist   Transfers   Transfers sit-stand transfer   Sit-Stand Transfer   Sit-Stand [place] (Transfers) minimum assist (75% patient effort)   Assistive Device (Sit-Stand Transfers) walker, front-wheeled   Balance   Balance Assessment standing balance: dynamic   Balance Comments Fair - demos some LOB during stand and fxl ambulation   Activities of Daily Living   BADL Assessment/Intervention lower body dressing;toileting;grooming   Lower Body Dressing Assessment/Training   [place] Level (Lower Body Dressing) maximum assist (25% patient effort)   Grooming Assessment/Training   [place] Level (Grooming) minimum assist (75% patient effort)   Toileting   [place] Level (Toileting) moderate assist (50% patient effort)   Clinical Impression   Criteria for Skilled Therapeutic Interventions Met (OT) Yes, treatment indicated   OT Diagnosis Decreased ind with ADLs and safety   Influenced by the following impairments Anorexia, generalized weakness   OT Problem List-Impairments impacting ADL activity tolerance impaired;balance;cognition;strength;mobility   Assessment of Occupational Performance 3-5 Performance Deficits   Identified Performance Deficits dressing, toileting, bathing, fxl transfers/mobility, cognition   Planned Therapy Interventions (OT) ADL retraining;strengthening;cognition;progressive activity/exercise;balance training   Clinical Decision Making Complexity (OT) moderate complexity   Risk & Benefits of therapy have been explained evaluation/treatment results reviewed;care plan/treatment goals reviewed;risks/benefits reviewed;current/potential barriers reviewed;patient   OT Total Evaluation Time   OT Eval, Moderate Complexity Minutes (48444) 10   Therapy Certification   Medical Diagnosis Anorexia   Start of Care Date 06/25/23   Certification date from 06/26/23   Certification  date to 07/26/23   OT Goals   Therapy Frequency (OT) Daily   OT Predicted Duration/Target Date for Goal Attainment 07/03/23   OT Goals Hygiene/Grooming;Lower Body Dressing;Toilet Transfer/Toileting;Cognition   OT: Hygiene/Grooming supervision/stand-by assist;while standing   OT: Lower Body Dressing Modified independent   OT: Toilet Transfer/Toileting Modified independent;toilet transfer;cleaning and garment management   OT: Cognitive Patient/caregiver will verbalize understanding of cognitive assessment results/recommendations as needed for safe discharge planning   Interventions   Interventions Quick Adds Self-Care/Home Management   Self-Care/Home Management   Self-Care/Home Mgmt/ADL, Compensatory, Meal Prep Minutes (25314) 10   Symptoms Noted During/After Treatment (Meal Preparation/Planning Training) fatigue   Treatment Detail/Skilled Intervention Evaluation completed - treatment initated. Mod A for garment mgmt standing at toilet - toilet transfer min A for controlled descent, cueing for hand placement to grab bar. Mod A to doff/don brief and pants at toilet. G/h standing at sink ~ 2 minute with min A for balance, cueing for sequencing and set up for materials. Returned to EOB min A for walker advancement. Left in bed with call light in reach, all needs met.   OT Discharge Planning   OT Plan Toileting, LB dressing, g/h standing, balance, SLUMS   OT Discharge Recommendation (DC Rec) Transitional Care Facility   OT Rationale for DC Rec Pt currently requires min A x1 for mobility, ADLs, and transfers - recommend TCU to progress fxl strength or increase assist level at residential if possible   OT Brief overview of current status Min A fxl mob, mod A toileting   Total Session Time   Timed Code Treatment Minutes 10   Total Session Time (sum of timed and untimed services) 20    [facility]  OUTPATIENT OCCUPATIONAL THERAPY  EVALUATION  PLAN OF TREATMENT FOR OUTPATIENT REHABILITATION  (COMPLETE FOR  INITIAL CLAIMS ONLY)  Patient's Last Name, First Name, M.I.  YOB: 1932  Alicia Coates                          Provider's Name  [facility] Medical Record No.  [id number]                             Onset Date:  06/25/23   Start of Care Date:  06/25/23   Type:     ___PT   _X_OT   ___SLP Medical Diagnosis:  Anorexia                    OT Diagnosis:  Decreased ind with ADLs and safety Visits from SOC:  1     See note for plan of treatment, functional goals and certification details    I CERTIFY THE NEED FOR THESE SERVICES FURNISHED UNDER        THIS PLAN OF TREATMENT AND WHILE UNDER MY CARE     (Physician co-signature of this document indicates review and certification of the therapy plan).

## 2023-06-26 NOTE — PHARMACY-ANTICOAGULATION SERVICE
Clinical Pharmacy - Warfarin Dosing Consult     Pharmacy has been consulted to manage this patient s warfarin therapy.  Indication: Atrial Fibrillation  Therapy Goal: INR 2-3  Provider/Team: [facility]  Warfarin Prior to Admission: Yes  Warfarin PTA Regimen: 2.5 mg MON and FRI, 5 mg AOD  Dose Comments: INR supratherapeutic = HOLD warfarin for today and recheck an INR in the AM    INR   Date Value Ref Range Status   06/26/2023 3.14 (H) 0.85 - 1.15 Final   06/25/2023 2.63 (H) 0.85 - 1.15 Final       Recommend warfarin 0 mg today.  Pharmacy will monitor Alicia BECKA Neilruslan daily and order warfarin doses to achieve specified goal.      Please contact pharmacy as soon as possible if the warfarin needs to be held for a procedure or if the warfarin goals change.

## 2023-06-26 NOTE — PROGRESS NOTES
VIV reviewed chart.  Pt accepted at [facility].  VIV placed call to Pt's son, Scott, who reports preference for [facility].  VIV notified Rebeca in admissions at [facility].  VIV asked Scott if they want medical transport arranged or if family will transport. Scott is going to think about this.  SW to follow up with Scott on 6/27. VIV notified MD of accepting TCU bed. Need PAS.     LENEA Spence

## 2023-06-26 NOTE — PROGRESS NOTES
[facility]    Progress Note - Hospitalist Service       Date of Admission:  6/25/2023    Assessment & Plan   Alicia Coates is a 89 year old female admitted on 6/25/2023. She has a history of dementia, pAF (on warfarin), hypertension, hypothyroidism, T2DM and presents with generalized weakness and poor PO intake since COVID infection in early June. Echo findings unlikely to be causative. PT/OT recommending TCU, referrals sent by CM.      Generalized weakness   Fatigue  Presents with worsening generalized weakness, fatigue, and PO intake. Patient tested positive for COVID on 6/8 and underwent a 10-day quarantine at her new [facility]. Since then, she has had progressive weakness and is now unable to rise from a lying or seated position. She has been sleeping much more than usual and eating/drinking very little. Vitals significant for hypertension. Exam with dry mucous membranes, systolic murmur. Labs largely unremarkable, including CBC, BMP, LFTs, lipase, lactate, INR, UA. Troponin 26. TSH mildly elevated 4.95 with T4 wnl. CXR clear. CT head negative for acute findings. Electrolytes and digoxin level reassuring. At this time, seems most likely that patient is slow to recover from recent COVID infection given age, dementia, and new environment at [facility]. Depression also possible etiology, particularly given recent move to [facility] and social isolation during COVID quarantine. Differential also includes dehydration, progression of dementia, subclinical hypothyroidism, recent CVA (though reassuring neuro exam). Echo showing some mild to moderate abnormalities but unlikely to be playing role in patient's acute sxs. PT/OT recommending TCU, family in agreement, referrals sent by CM today 6/26.   - await placement updates   - Daily BMP, CBC  - Consider further mental health discussion/assessment      Anorexia    Dehydration, mild   Protein-calorie malnutrition  Unintentional weight loss   Per son, patient  has had very poor PO intake of solids and liquids since she got COVID at the beginning of June. He noticed that her chronic significant bilateral lower extremity edema almost completely resolved, which made him worried that she is dehydrated. Weight is 130 lbs (unclear if this is per patient report or actual measurement), down from 150 lbs in 12/2022. VSS. Exam with dry mucous membranes. Labs without evidence of dehydration - normal electrolytes, renal function. Does have some ketones in her urine. Albumin mildly low 3.4. Does not appear overly dehydrated; however, given history of poor oral intake and dry mucous membranes, will give IVF overnight and assess response. Per nursing, patient eating minimally and only when encouraged/reminded to - could be possible etiology of presenting sxs.   - nutrition/calorie counting ordered  - continue gentle mIVF @ 50 mL/hr  - Encourage PO intake   - Monitor I/Os  - Daily BMP, CBC      Hypertension   History of hypertension. On lisinopril 20 mg daily PTA. -220s/80-90s. No evidence of end organ dysfunction. Received 10 mg IV labetolol in ED.   - PTA lisinopril 20 mg daily   - IV hydralazine PRN for SBP > 200      Systolic murmur  Exam with 3/6 systolic murmur, loudest at RUSB with radiation to carotid, also heard at LUSB and LLSB. Likely aortic stenosis. Possible that this could be contributing to her symptoms of generalized weakness, fatigue. Echo showing mild to moderate stenosis of trileaflet thickened aortic valve, as well as mild mitral stenosis and mild to moderate concentric LVH with normal EF.      Atrial fibrillation   History of afib. On digoxin and warfarin. INR 2.6. Telemetry with a fib, rate 80s-90s.   - PTA warfarin, pharmacy to dose   - PTA digoxin 250 mcg daily   - Daily INR      Hypothyroidism   History of hypothyroidism. TSH elevated 4.95, free T4 wnl at 1.28.   - PTA levothyroxine 25 mcg daily      Type 2 diabetes mellitus   History of T2DM without  "long-term use of insulin. PTA medications: none. Most recent A1C 6.7% in 12/2022; now 8.4%.  - POC glucose checks QID   - Medium resistance SSI      Discharge planning  Patient moved into [facility] in May 2023. She was living independently prior. Primary decision makers are son Scott and daughter Cristina. Patient is currently receiving \"level one assistance\" at [facility], which is the most basic option and includes meals, well checks, medication management. Likely needs higher level of care.   - PT/OT/CM - TCU placement pending per OT/PT recs       Diet: Regular Diet Adult  Calorie Counts    DVT Prophylaxis: Warfarin  Elena Catheter: Not present  Fluids: mIVF  Lines: None     Cardiac Monitoring: None  Code Status: No CPR- Do NOT Intubate      Clinically Significant Risk Factors Present on Admission              # Hypoalbuminemia: Lowest albumin = 3.4 g/dL at 6/25/2023  6:41 PM, will monitor as appropriate  # Drug Induced Coagulation Defect: home medication list includes an anticoagulant medication    # Hypertension: Noted on problem list     # DMII: A1C = 8.4 % (Ref range: <5.7 %) within past 6 months             Disposition Plan      Expected Discharge Date: 06/27/2023      Destination: home with help/services;inpatient rehabilitation facility  Discharge Comments: FTT   from [facility]        The patient's care was discussed with Dr. Mcnulty.     Jose Castillo MD  Hospitalist Service  [facility]  Securely message with [employer] (more info)  Text page via [employer] Paging/Directory   ______________________________________________________________________    Interval History   NAEON.  Denies pain or other concerns.     Physical Exam   Vital Signs: Temp: 99  F (37.2  C) Temp src: Oral BP: (!) 186/88 (RN Notified) Pulse: 85   Resp: 20 SpO2: 94 % O2 Device: None (Room air) Oxygen Delivery: 1 LPM  Weight: 136 lbs 10.96 oz    GEN: Pleasant female, laying in bed, in no acute distress.   HEENT: " Normocephalic, atraumatic. Extraoccular eye movements intact. Anicteric sclera. Dry oral mucous membranes.   NECK: Supple. No cervical or supraclavicular adenopathy.   PULM: Non-labored breathing. No use of accessory muscles. Clear to ausculation bilaterally. No wheezes or crackles.   CV: Irregularly irregular rhythm, rate in 80s. 3/5 systolic murmur loudest at RUSB and radiating to right carotid, also present at LUSB and LLSB.   ABDOMEN: Normoactive bowel sounds. Non-tender to palpation. Non-distended.   EXTREMITES:  No clubbing, cyanosis. Mild bilateral lower extremity edema to ankles, much less than baseline per son.  NEURO:  Awake. Oriented to person, place. Not oriented to time or situation. Cranial nerves 2-12 grossly intact. Moving all extremities. 4/5 strength in bilateral arms and legs.   PSYCH: Calm. Appropriate affect.     Medical Decision Making       Please see A&P for additional details of medical decision making.      Data   ------------------------- PAST 24 HR DATA REVIEWED -----------------------------------------------

## 2023-06-27 ENCOUNTER — LAB REQUISITION (OUTPATIENT)
Dept: LAB | Facility: CLINIC | Age: 88
End: 2023-06-27
Payer: COMMERCIAL

## 2023-06-27 VITALS
SYSTOLIC BLOOD PRESSURE: 150 MMHG | WEIGHT: 139.2 LBS | TEMPERATURE: 97.5 F | HEART RATE: 72 BPM | RESPIRATION RATE: 16 BRPM | OXYGEN SATURATION: 94 % | DIASTOLIC BLOOD PRESSURE: 80 MMHG | BODY MASS INDEX: 22.47 KG/M2

## 2023-06-27 VITALS
OXYGEN SATURATION: 96 % | RESPIRATION RATE: 18 BRPM | SYSTOLIC BLOOD PRESSURE: 172 MMHG | HEART RATE: 64 BPM | HEIGHT: 66 IN | WEIGHT: 136.69 LBS | BODY MASS INDEX: 21.97 KG/M2 | DIASTOLIC BLOOD PRESSURE: 96 MMHG | TEMPERATURE: 97.7 F

## 2023-06-27 DIAGNOSIS — I48.91 UNSPECIFIED ATRIAL FIBRILLATION (H): ICD-10-CM

## 2023-06-27 LAB
ANION GAP SERPL CALCULATED.3IONS-SCNC: 8 MMOL/L (ref 7–15)
BUN SERPL-MCNC: 13.1 MG/DL (ref 8–23)
CALCIUM SERPL-MCNC: 9 MG/DL (ref 8.2–9.6)
CHLORIDE SERPL-SCNC: 102 MMOL/L (ref 98–107)
CREAT SERPL-MCNC: 0.71 MG/DL (ref 0.51–0.95)
DEPRECATED HCO3 PLAS-SCNC: 25 MMOL/L (ref 22–29)
ERYTHROCYTE [DISTWIDTH] IN BLOOD BY AUTOMATED COUNT: 14.3 % (ref 10–15)
GFR SERPL CREATININE-BSD FRML MDRD: 80 ML/MIN/1.73M2
GLUCOSE BLDC GLUCOMTR-MCNC: 110 MG/DL (ref 70–99)
GLUCOSE BLDC GLUCOMTR-MCNC: 95 MG/DL (ref 70–99)
GLUCOSE SERPL-MCNC: 114 MG/DL (ref 70–99)
HCT VFR BLD AUTO: 39.1 % (ref 35–47)
HGB BLD-MCNC: 12.8 G/DL (ref 11.7–15.7)
INR PPP: 3.02 (ref 0.85–1.15)
MCH RBC QN AUTO: 28.6 PG (ref 26.5–33)
MCHC RBC AUTO-ENTMCNC: 32.7 G/DL (ref 31.5–36.5)
MCV RBC AUTO: 88 FL (ref 78–100)
PLATELET # BLD AUTO: 215 10E3/UL (ref 150–450)
POTASSIUM SERPL-SCNC: 3.9 MMOL/L (ref 3.4–5.3)
RBC # BLD AUTO: 4.47 10E6/UL (ref 3.8–5.2)
SODIUM SERPL-SCNC: 135 MMOL/L (ref 136–145)
WBC # BLD AUTO: 7.6 10E3/UL (ref 4–11)

## 2023-06-27 PROCEDURE — 250N000013 HC RX MED GY IP 250 OP 250 PS 637: Performed by: FAMILY MEDICINE

## 2023-06-27 PROCEDURE — 85027 COMPLETE CBC AUTOMATED: CPT

## 2023-06-27 PROCEDURE — G0378 HOSPITAL OBSERVATION PER HR: HCPCS

## 2023-06-27 PROCEDURE — 36415 COLL VENOUS BLD VENIPUNCTURE: CPT

## 2023-06-27 PROCEDURE — 99238 HOSP IP/OBS DSCHRG MGMT 30/<: CPT

## 2023-06-27 PROCEDURE — 82310 ASSAY OF CALCIUM: CPT

## 2023-06-27 PROCEDURE — 82962 GLUCOSE BLOOD TEST: CPT

## 2023-06-27 PROCEDURE — 82374 ASSAY BLOOD CARBON DIOXIDE: CPT

## 2023-06-27 PROCEDURE — 250N000013 HC RX MED GY IP 250 OP 250 PS 637

## 2023-06-27 PROCEDURE — 85610 PROTHROMBIN TIME: CPT

## 2023-06-27 PROCEDURE — 96361 HYDRATE IV INFUSION ADD-ON: CPT

## 2023-06-27 RX ORDER — MULTIPLE VITAMINS W/ MINERALS TAB 9MG-400MCG
1 TAB ORAL DAILY
Qty: 30 TABLET | Refills: 0 | Status: SHIPPED | OUTPATIENT
Start: 2023-06-27 | End: 2023-06-27

## 2023-06-27 RX ORDER — MULTIVITAMIN,THERAPEUTIC
1 TABLET ORAL DAILY
Status: DISCONTINUED | OUTPATIENT
Start: 2023-06-27 | End: 2023-06-27 | Stop reason: HOSPADM

## 2023-06-27 RX ORDER — LANOLIN ALCOHOL/MO/W.PET/CERES
100 CREAM (GRAM) TOPICAL DAILY
Qty: 30 TABLET | Refills: 0 | Status: SHIPPED | OUTPATIENT
Start: 2023-06-27 | End: 2023-06-27

## 2023-06-27 RX ADMIN — ACETAMINOPHEN 650 MG: 325 TABLET ORAL at 14:02

## 2023-06-27 RX ADMIN — LISINOPRIL 20 MG: 20 TABLET ORAL at 08:53

## 2023-06-27 RX ADMIN — DIGOXIN 250 MCG: 125 TABLET ORAL at 08:53

## 2023-06-27 RX ADMIN — LEVOTHYROXINE SODIUM 25 MCG: 0.03 TABLET ORAL at 08:53

## 2023-06-27 RX ADMIN — THIAMINE HCL TAB 100 MG 100 MG: 100 TAB at 13:45

## 2023-06-27 RX ADMIN — THERA TABS 1 TABLET: TAB at 13:45

## 2023-06-27 RX ADMIN — ACETAMINOPHEN 650 MG: 325 TABLET ORAL at 04:04

## 2023-06-27 ASSESSMENT — ACTIVITIES OF DAILY LIVING (ADL)
ADLS_ACUITY_SCORE: 46
ADLS_ACUITY_SCORE: 43
ADLS_ACUITY_SCORE: 42
ADLS_ACUITY_SCORE: 43
ADLS_ACUITY_SCORE: 42
ADLS_ACUITY_SCORE: 43
ADLS_ACUITY_SCORE: 42
ADLS_ACUITY_SCORE: 42

## 2023-06-27 NOTE — CONSULTS
CLINICAL NUTRITION SERVICES - ASSESSMENT NOTE     Nutrition Prescription    RECOMMENDATIONS FOR MDs/PROVIDERS TO ORDER:  None    Malnutrition Status:    Severe malnutrition  In Context of:  Acute illness or injury    Recommendations already ordered by Registered Dietitian (RD):  Ensure enlive chocolate BID at Breakfast and Lunch   MVI w/ minerals d/t poor po   Thiamin d/t malnutrition     Future/Additional Recommendations:  Weight, po intake, electrolytes, BG, supplement german.      REASON FOR ASSESSMENT  Alicia Coates is a/an 89 year old female assessed by the dietitian for Provider Order - Generalized weakness- poor PO intake? Calorie counts     HPI   Pt w/ history of dementia, pAF (on warfarin), hypertension, hypothyroidism, T2DM and presents with generalized weakness and poor PO intake since COVID infection in early June. On 6/19, the day after her quarantine ended, Scott visited her in the assisted living.  He noticed that patient's chronic significant lower extremity edema had resolved completely.  This made him worried that she was not getting enough to drink during her quarantine.  He also noticed that she had not been eating much and missed a couple days of her medications. Ever since her COVID quarantine, patient has seemed to have lost interest in eating or drinking.  She states that she gets full after a couple small bites.  She is also not going to the dining room to eat meals.  Patient is sleeping/napping most of the day for the last couple days.     NUTRITION HISTORY  Met with pt at bedside this AM. Pt unsure of weight hx and nutritional hx. Per chart review pt with very minimal po intakes since at least 6/19. Pt reports consuming chocolate protein shakes, will try during admit.   NKFA    CURRENT NUTRITION ORDERS  Diet: Regular  Intake/Tolerance: Poor po intakes noted since 6/8/23.  Pt meeting <50% nutritional needs since admit.     LABS  Labs reviewed- Na 135(L), Glucose  last 24 hr  "    MEDICATIONS  Medications reviewed- Lanoxin, novolog, levothyroxine, zestril    ANTHROPOMETRICS  Height: 167.6 cm (5' 6\")  Most Recent Weight: 62 kg (136 lb 11 oz)    Weight History: Unclear weight hx with weights of 160 lb, 140 lb, likely being stated weights.   Wt Readings from Last 10 Encounters:   06/26/23 62 kg (136 lb 11 oz)   02/15/23 72.6 kg (160 lb)   02/15/23 63.5 kg (140 lb)   08/05/21 72.6 kg (160 lb)   02/19/16 79.4 kg (175 lb)   08/06/13 78.7 kg (173 lb 9.6 oz)   07/09/13 78.9 kg (174 lb)   06/11/13 79.9 kg (176 lb 3.2 oz)   03/12/13 82.2 kg (181 lb 3.2 oz)   11/19/12 80.7 kg (178 lb)        Dosing Weight: 62 kg    ASSESSED NUTRITION NEEDS  Estimated Energy Needs: 8746-8295 kcals/day (25 - 30 kcals/kg)  Justification: Maintenance  Estimated Protein Needs: 62-75 grams protein/day (1 - 1.2 grams of pro/kg)  Justification: Maintenance  Estimated Fluid Needs: 1550+ mL/day (1 mL/kcal)   Justification: Maintenance    PHYSICAL FINDINGS  See malnutrition section below.  Per Flowsheets:   GI: WDL  BM: none documented   Skin: no wounds noted  Edema: trace/mild edema noted BLE   I/Os: x4 not documeted     MALNUTRITION:  % Weight Loss: Unclear d/t weight hx unclear.   % Intake:  </= 50% for >/= 5 days (severe malnutrition)  Subcutaneous Fat Loss:  Orbital region moderate depletion and Upper arm region mderate depletion  Muscle Loss:  Temporal region severe depletion, Clavicle bone region severe depletion, Acromion bone region severe depletion and Dorsal hand region severe depletion  Fluid Retention:  Mild BLE    Malnutrition Diagnosis: Severe malnutrition  In Context of:  Acute illness or injury    NUTRITION DIAGNOSIS  Inadequate oral intake related to decreased appetite as evidenced by not meeting estimated nutritional needs via po intake.       INTERVENTIONS  Implementation  Ensure enlive BID chocolate   MVI w/ minerals due to poor po   Thiamin daily d/t malnutrition     Goals  Pt to meet >75% nutritional " needs   Electrolytes WNL   BG < 180      Monitoring/Evaluation  Weight, po intake, electrolytes, BG, supplement german.

## 2023-06-27 NOTE — PLAN OF CARE
"  Problem: Plan of Care - These are the overarching goals to be used throughout the patient stay.    Goal: Plan of Care Review  Description: The Plan of Care Review/Shift note should be completed every shift.  The Outcome Evaluation is a brief statement about your assessment that the patient is improving, declining, or no change.  This information will be displayed automatically on your shift note.  Outcome: Progressing  Goal: Patient-Specific Goal (Individualized)  Description: You can add care plan individualizations to a care plan. Examples of Individualization might be:  \"Parent requests to be called daily at 9am for status\", \"I have a hard time hearing out of my right ear\", or \"Do not touch me to wake me up as it startles me\".  Outcome: Progressing  Goal: Absence of Hospital-Acquired Illness or Injury  Outcome: Progressing  Intervention: Identify and Manage Fall Risk  Recent Flowsheet Documentation  Taken 6/26/2023 2146 by Maranda Souza RN  Safety Promotion/Fall Prevention:   activity supervised   assistive device/personal items within reach   clutter free environment maintained   nonskid shoes/slippers when out of bed   safety round/check completed   Goal Outcome Evaluation:                        "

## 2023-06-27 NOTE — PLAN OF CARE
Occupational Therapy Discharge Summary    Reason for therapy discharge:    Discharged to transitional care facility.    Progress towards therapy goal(s). See goals on Care Plan in [facility] electronic health record for goal details.  Goals not met.  Barriers to achieving goals:   limited tolerance for therapy.    Therapy recommendation(s):    Continued therapy is recommended.  Rationale/Recommendations:  for progression and resume to norm for ADLs.

## 2023-06-27 NOTE — PROGRESS NOTES
Care Management Discharge Note    Discharge Date: 06/27/2023       Discharge Disposition: Transitional Care    Discharge Services: skilled nursing, therapy     Discharge DME:  (per treatment team)    Discharge Transportation: health plan transportation    Private pay costs discussed: Not applicable    Does the patient's insurance plan have a 3 day qualifying hospital stay waiver?  Yes   Will the waiver be used for post-acute placement? Yes    PAS Confirmation Code: [id number]  Patient/family educated on Medicare website which has current facility and service quality ratings: yes    Education Provided on the Discharge Plan: Yes  Persons Notified of Discharge Plans: patient's son   Patient/Family in Agreement with the Plan: yes    Handoff Referral Completed: yes    Additional Information:  Patient accepted at [facility] for TCU.  Per MD, Pt is medically appropriate for discharge.  [facility] has a bed available for today.  SW contacted Pt's son, Scott, to discuss discharge plan and transportation.  Scott is in agreement with [facility] stretcher transport, reviewed potential out of pocket charges, which Scott is understanding of.  [facility] stretcher transport arranged for  between [id number].  PAS and PCS completed and on chart.         LEENA Spence

## 2023-06-27 NOTE — PLAN OF CARE
Physical Therapy Discharge Summary    Reason for therapy discharge:    Discharged to transitional care facility.    Progress towards therapy goal(s). See goals on Care Plan in [facility] electronic health record for goal details.  Goals partially met.  Barriers to achieving goals:   discharge from facility.    Therapy recommendation(s):    Recommend continued therapies to improve overall strength, balance and mobility.       Marilyn Coker, PT, DPT  6/27/2023

## 2023-06-27 NOTE — DISCHARGE SUMMARY
[facility]  Discharge Summary - Medicine & Pediatrics       Date of Admission:  6/25/2023  Date of Discharge:  6/27/2023  Discharging Provider: Jose Castillo MD  Discharge Service: Hospitalist Service    Discharge Diagnoses   Generalized weakness  Fatigue  Dehydration, improved  Malnutrition  Unintentional weight loss  Hypertension, improved  Systolic murmur - aortic and mitral stenosis  Atrial fibrillation on warfarin    Clinically Significant Risk Factors     # DMII: A1C = 8.4 % (Ref range: <5.7 %) within past 6 months  # Severe Malnutrition: based on nutrition assessment      Follow-ups Needed After Discharge   Follow-up Appointments     Follow Up and recommended labs and tests      Follow up with TCU physician.  Monitor PO intake. No follow up labs or   test are needed.            Unresulted Labs Ordered in the Past 30 Days of this Admission     No orders found from 5/26/2023 to 6/26/2023.        Discharge Disposition   Discharged to TCU.  Condition at discharge: Stable    Hospital Course   Alicia Coates is a 89 year old female admitted on 6/25/2023. She has a history of dementia, pAF (on warfarin), hypertension, hypothyroidism, T2DM and presents with generalized weakness and poor PO intake since COVID infection in early June. Echo here reassuring. PT/OT recommended TCU placement.      Generalized weakness   Fatigue  Presents with worsening generalized weakness, fatigue, and PO intake. Patient tested positive for COVID on 6/8 and underwent a 10-day quarantine at her new [facility]. Since then, she has had progressive weakness and is now unable to rise from a lying or seated position. She has been sleeping much more than usual and eating/drinking very little. Vitals significant for hypertension. Exam with dry mucous membranes, systolic murmur. Labs largely unremarkable, including CBC, BMP, LFTs, lipase, lactate, INR, UA. Troponin 26. TSH mildly elevated 4.95 with T4 wnl. CXR clear. CT head  negative for acute findings. Electrolytes and digoxin level reassuring. Echo here showed some mild to moderate abnormalities but unlikely to be playing role in patient's acute sxs. PT/OT recommended TCU placement. Given son's report that patient requires frequent direction and reminders regarding feeding in setting of dementia, suspect presenting sxs largely related to poor PO intake. Also could be slow to recover from recent COVID infection given age, dementia and new living environment. Depression in context of social isolation may also be factor.   - Ensure adequate daily PO intake  - Ongoing PT/OT upon discharge, anticipate higher level of cares upon returning to [facility]  - Consider further mental health workup/trial of antidepressant?    Dehydration, improved  Protein-calorie malnutrition  Unintentional weight loss   Per son, patient has had very poor PO intake of solids and liquids since she got COVID at the beginning of June. He noticed that her chronic significant bilateral lower extremity edema almost completely resolved, which made him worried that she is dehydrated. Weight is 130 lbs (unclear if this is per patient report or actual measurement), down from 150 lbs in 12/2022. VSS. Exam with dry mucous membranes. Labs without evidence of dehydration - normal electrolytes, renal function. Does have some ketones in her urine. Albumin mildly low 3.4. Does not appear overly dehydrated; however, given history of poor oral intake and dry mucous membranes, treated with IVF. Per nursing, patient has been eating minimally and only when encouraged/reminded to, thus likely etiology for presenting sxs.   - Ensure adequate daily PO intake  - Nutrition recommending:   - Ensure enlive chocolate BID at Breakfast and Lunch    - MVI w/ minerals d/t poor po    - Thiamin d/t malnutrition      Hypertension, improved  History of hypertension. -220s/80-90s on presentation. No evidence of end organ dysfunction. Received 10 mg IV  labetolol in ED. BP improved throughout stay with continuing PTA lisinopril.      Systolic murmur - aortic and mitral stenosis  Exam with 3/6 systolic murmur, loudest at RUSB with radiation to carotid, also heard at LUSB and LLSB. Initially considered possible valvular dysfunction could be contributing to her symptoms of generalized weakness, fatigue. However, echo only showed mild to moderate stenosis of trileaflet thickened aortic valve, as well as mild mitral stenosis and mild to moderate concentric LVH with normal EF.      Atrial fibrillation   History of afib. On digoxin and warfarin. INR 2.6 on admission. Telemetry with a fib, rate 80s-90s. Continued PTA meds with pharmacy dosing warfarin.       Consultations This Hospital Stay   PHARMACY TO DOSE WARFARIN  CARE MANAGEMENT / SOCIAL WORK IP CONSULT  PHYSICAL THERAPY ADULT IP CONSULT  OCCUPATIONAL THERAPY ADULT IP CONSULT  NUTRITION SERVICES ADULT IP CONSULT  PHYSICAL THERAPY ADULT IP CONSULT  OCCUPATIONAL THERAPY ADULT IP CONSULT    Code Status   No CPR- Do NOT Intubate       The patient was discussed with Dr. Mcnulty.    Jose Castillo MD  [address] [address]  Phone: [phone]  Fax: [phone]  ______________________________________________________________________    Physical Exam   Vital Signs: Temp: 97.4  F (36.3  C) Temp src: Oral BP: (!) 159/84 Pulse: 67   Resp: 18 SpO2: 96 % O2 Device: None (Room air)    Weight: 136 lbs 10.96 oz     GEN: Pleasant, sitting up in bed eating, alert, in no acute distress.   HEENT: Normocephalic, atraumatic. Extraoccular eye movements intact. Anicteric sclera. MMM.  PULM: Non-labored breathing. No use of accessory muscles. Clear to ausculation bilaterally. No wheezes or crackles.   CV: Irregularly irregular rhythm, rate in 80s. 3/5 systolic murmur loudest at RUSB and radiating to right carotid, also present at LUSB and LLSB.   ABDOMEN:  Normoactive bowel sounds. Non-tender to palpation. Non-distended.   EXTREMITES:  No clubbing, cyanosis. Mild bilateral lower extremity edema to ankles, much less than baseline per son.  NEURO:  Awake. Oriented to person, place. Not oriented to time or situation. Cranial nerves 2-12 grossly intact. Moving all extremities. 4/5 strength in bilateral arms and legs.   PSYCH: Calm. Appropriate affect.       Primary Care Physician   LOW WATSON    Discharge Orders      General info for SNF    Length of Stay Estimate: Short Term Care: Estimated # of Days <30  Condition at Discharge: Improving  Level of care:skilled   Rehabilitation Potential: Good  Admission H&P remains valid and up-to-date: Yes  Recent Chemotherapy: N/A  Use Nursing Home Standing Orders: Yes     Mantoux instructions    Give two-step Mantoux (PPD) Per Facility Policy Yes     Reason for your hospital stay    Generalized weakness, likely multifactorial etiology (malnutrition, recent COVID infection, worsening dementia, depression)     Activity - Up with nursing assistance     Follow Up and recommended labs and tests    Follow up with TCU physician.  Monitor PO intake. No follow up labs or test are needed.     No CPR- Do NOT Intubate     Physical Therapy Adult Consult    Evaluate and treat as clinically indicated.    Reason:  generalized weakness     Occupational Therapy Adult Consult    Evaluate and treat as clinically indicated.    Reason:  generalized weakness, worsening dementia     Fall precautions     Diet    Follow this diet upon discharge:      Regular Diet Adult (needs encouragement/reminders to eat!)  Nutrition recommending:   Ensure enlive chocolate BID at Breakfast and Lunch   MVI w/ minerals d/t poor po   Thiamin d/t malnutrition       Significant Results and Procedures   Results for orders placed or performed during the hospital encounter of 06/25/23   Head CT w/o contrast    Narrative    EXAM: CT HEAD W/O CONTRAST  LOCATION: [facility]  DATE: 2023    INDICATION: Weakness, hypertension on warfarin  COMPARISON: None.  TECHNIQUE: Routine CT Head without IV contrast. Multiplanar reformats. Dose reduction techniques were used.    FINDINGS:  INTRACRANIAL CONTENTS: No intracranial hemorrhage, extraaxial collection, or mass effect.  No CT evidence of acute infarct. Mild to moderate presumed chronic small vessel ischemic changes. Chronic bilateral caudate lacunar infarcts. Mild to moderate   generalized volume loss. No hydrocephalus.     VISUALIZED ORBITS/SINUSES/MASTOIDS: Prior bilateral cataract surgery. Visualized portions of the orbits are otherwise unremarkable. No significant paranasal sinus mucosal disease. No middle ear or mastoid effusion.    BONES/SOFT TISSUES: No acute abnormality.      Impression    IMPRESSION:  1.  No CT evidence for acute intracranial process.  2.  Brain atrophy and presumed chronic microvascular ischemic changes as above.   XR Chest 1 View    Narrative    EXAM: XR CHEST 1 VIEW  LOCATION: [facility]  DATE: 2023    INDICATION: weaknees evaluatate for pneumonia  COMPARISON: 2021      Impression    IMPRESSION: Elevated right hemidiaphragm. No pneumothorax or pleural effusion. No focal consolidation. Stable mildly enlarged cardiac silhouette with atherosclerotic vascular calcifications.   Echocardiogram Complete     Value    LVEF  60-65%    Narrative    [id number]  [id number]  [id number]  [id number]^SB^[place] [address]     Name: ABIEL PENN  MRN: [id number]  : 1932  Study Date: 2023 09:23 AM  Age: 89 yrs  Gender: Female  Patient Location: [facility]  Reason For Study: Cardiac Murmur  Ordering Physician: JEFERSON BASURTO  Performed By: TIANA     BSA: 1.7 m2  Height: 66 in  Weight: 130 lb  HR: 93  BP: 181/78  mmHg  ______________________________________________________________________________  Procedure  Complete Portable Echo Adult. There is no comparison study available.  ______________________________________________________________________________  Interpretation Summary     1.Left ventricular size, wall motion and function are normal. The ejection  fraction is 60-65%.  2.There is mild to moderate concentric left ventricular hypertrophy.  3.Mildly decreased right ventricular systolic function  4.Moderate bi-atrial enlargement.  5.There is mild mitral stenosis. Mean gradient is 3 mmHg.  6.Mild to moderate stenosis of a trileaflet thickened aortic valve with  dimensionless index 0.31, peak velocity of 2.9 m/s and mean gradient 19 mmHg  at stroke-volume index of 44 mL/metered squared.  There is no comparison study available.  ______________________________________________________________________________  I      WMSI = 1.00     % Normal = 100     X - Cannot   0 -                      (2) - Mildly 2 -          Segments  Size  Interpret    Hyperkinetic 1 - Normal  Hypokinetic  Hypokinetic  1-2     small                                                     7 -          3-5      moderate  3 - Akinetic 4 -          5 -         6 - Akinetic Dyskinetic   6-14    large               Dyskinetic   Aneurysmal  w/scar       w/scar       15-16   diffuse     Left Ventricle  Left ventricular size, wall motion and function are normal. The ejection  fraction is 60-65%. There is mild to moderate concentric left ventricular  hypertrophy. Left ventricular diastolic function is not assessable. No  regional wall motion abnormalities noted.     Right Ventricle  The right ventricle is normal size. TAPSE is abnormal, which is consistent  with abnormal right ventricular systolic function. Mildly decreased right  ventricular systolic function.     Atria  The left atrium is mild to moderately dilated. The right atrium is mild to  moderately dilated.  There is no color Doppler evidence of an atrial shunt.     Mitral Valve  The mitral valve leaflets appear thickened, but open well. There is  physiologic mitral regurgitation. There is mild mitral stenosis. Mean gradient  is 3 mmHg.     Tricuspid Valve  The tricuspid valve is not well visualized, but is grossly normal. Right  ventricle systolic pressure estimate normal. There is mild to moderate (1-2+)  tricuspid regurgitation.     Aortic Valve  Thickened aortic valve leaflets. The aortic valve is trileaflet. No aortic  regurgitation is present. Mild to moderate stenosis of a trileaflet thickened  aortic valve with dimensionless index 0.31, peak velocity of 2.9 m/s and mean  gradient 19 mmHg at stroke-volume index of 44 mL/metered squared.     Pulmonic Valve  The pulmonic valve is not well seen, but is grossly normal. This degree of  valvular regurgitation is within normal limits. There is no pulmonic valvular  stenosis.     Vessels  The aorta root is normal. Normal size ascending aorta. IVC diameter and  respiratory changes fall into an intermediate range suggesting an RA pressure  of 8 mmHg.     Pericardium  There is no pericardial effusion.     Rhythm  The rhythm was atrial fibrillation.     ______________________________________________________________________________  MMode/2D Measurements & Calculations  IVSd: 1.3 cm  LVIDd: 4.1 cm  LVIDs: 2.9 cm  LVPWd: 1.3 cm  FS: 30.6 %     LV mass(C)d: 198.3 grams  LV mass(C)dI: 119.1 grams/m2  Ao root diam: 3.0 cm  LA dimension: 4.5 cm  LA/Ao: 1.5  LVOT diam: 1.9 cm  LVOT area: 2.8 cm2  RV Base: 3.4 cm  RWT: 0.65  TAPSE: 1.5 cm     Time Measurements  MM HR: 88.0 BPM     Doppler Measurements & Calculations  MV E max christina: 143.0 cm/sec  MV max P.2 mmHg  MV mean PG: 3.0 mmHg  MV V2 VTI: 29.0 cm  MVA(VTI): 1.5 cm2  MV dec time: 0.18 sec  Ao V2 max: 284.3 cm/sec  Ao max P.0 mmHg  Ao V2 mean: 221.0 cm/sec  Ao mean P.0 mmHg  Ao V2 VTI: 54.2 cm  GLORIA(I,D): 0.81  cm2  GLORIA(V,D): 0.86 cm2  LV V1 max PG: 3.0 mmHg  LV V1 max: 86.7 cm/sec  LV V1 VTI: 15.5 cm     SV(LVOT): 43.9 ml  SI(LVOT): 26.4 ml/m2  PA acc time: 0.10 sec  TR max jeremiah: 233.0 cm/sec  TR max P.7 mmHg  AV Jeremiah Ratio (DI): 0.31  GLORIA Index (cm2/m2): 0.49  E/E' av.4  Lateral E/e': 18.5  Medial E/e': 22.4  RV S Jeremiah: 10.3 cm/sec     ______________________________________________________________________________  Report approved by: Alvin Walker 2023 11:41 AM             Discharge Medications   Current Discharge Medication List      START taking these medications    Details   multivitamin w/minerals (THERA-VIT-M) tablet Take 1 tablet by mouth daily  Qty: 30 tablet, Refills: 0    Associated Diagnoses: Severe protein-calorie malnutrition (H)      thiamine (B-1) 100 MG tablet Take 1 tablet (100 mg) by mouth daily  Qty: 30 tablet, Refills: 0    Associated Diagnoses: Severe protein-calorie malnutrition (H)         CONTINUE these medications which have NOT CHANGED    Details   digoxin (LANOXIN) 0.25 MG tablet Take 250 mcg by mouth daily.      levothyroxine (SYNTHROID/LEVOTHROID) 25 MCG tablet Take 25 mcg by mouth daily before breakfast      lisinopril (ZESTRIL) 20 MG tablet Take 20 mg by mouth daily      warfarin ANTICOAGULANT (COUMADIN) 5 MG tablet Take 2.5-5 mg by mouth daily Takes in the morning at home.  Was taking 2.5 mg (1/2 tablet) Mon, Fri (two days weekly) and 5 mg on the other days (5 days weekly) but INR was high so held 23 dose then was to take 2.5 mg daily until 23 recheck           Allergies   Allergies   Allergen Reactions     Neomycin Unknown     Nickel Unknown

## 2023-06-27 NOTE — PLAN OF CARE
Goal Outcome Evaluation:       Up to bathroom with assist x1 and walker. Reported headache this afternoon, PRN Tylenol given, effective per pt Pt discharging to [facility] TCU. Report given to LYNDON Rivera at facility. Son at bedside.

## 2023-06-27 NOTE — PLAN OF CARE
PRIMARY DIAGNOSIS: GENERALIZED WEAKNESS    OUTPATIENT/OBSERVATION GOALS TO BE MET BEFORE DISCHARGE  1. Orthostatic performed: N/A    2. Tolerating PO medications: Yes    3. Return to near baseline physical activity: Yes    4. Cleared for discharge by consultants (if involved): Yes    Discharge Planner Nurse   Safe discharge environment identified: Yes  Barriers to discharge: No, accepted to TCU, ride time between 1:40-2:25       Entered by: Sherin Ferguson RN 06/27/2023 11:13 AM     Please review provider order for any additional goals.   Nurse to notify provider when observation goals have been met and patient is ready for discharge.Goal Outcome Evaluation:

## 2023-06-28 ENCOUNTER — LAB REQUISITION (OUTPATIENT)
Dept: LAB | Facility: CLINIC | Age: 88
End: 2023-06-28
Payer: COMMERCIAL

## 2023-06-28 ENCOUNTER — TELEPHONE (OUTPATIENT)
Dept: GERIATRICS | Facility: CLINIC | Age: 88
End: 2023-06-28

## 2023-06-28 ENCOUNTER — TRANSITIONAL CARE UNIT VISIT (OUTPATIENT)
Dept: GERIATRICS | Facility: CLINIC | Age: 88
End: 2023-06-28
Payer: COMMERCIAL

## 2023-06-28 DIAGNOSIS — Z51.81 ENCOUNTER FOR THERAPEUTIC DRUG LEVEL MONITORING: ICD-10-CM

## 2023-06-28 DIAGNOSIS — E11.69 TYPE 2 DIABETES MELLITUS WITH OTHER SPECIFIED COMPLICATION, UNSPECIFIED WHETHER LONG TERM INSULIN USE (H): ICD-10-CM

## 2023-06-28 DIAGNOSIS — Z79.01 ANTICOAGULATION MANAGEMENT ENCOUNTER: ICD-10-CM

## 2023-06-28 DIAGNOSIS — R53.1 WEAKNESS GENERALIZED: Primary | ICD-10-CM

## 2023-06-28 DIAGNOSIS — Z51.81 ANTICOAGULATION MANAGEMENT ENCOUNTER: ICD-10-CM

## 2023-06-28 LAB — INR PPP: 2.75 (ref 0.85–1.15)

## 2023-06-28 PROCEDURE — 36415 COLL VENOUS BLD VENIPUNCTURE: CPT | Mod: ORL | Performed by: FAMILY MEDICINE

## 2023-06-28 PROCEDURE — P9604 ONE-WAY ALLOW PRORATED TRIP: HCPCS | Mod: ORL | Performed by: FAMILY MEDICINE

## 2023-06-28 PROCEDURE — 85610 PROTHROMBIN TIME: CPT | Mod: ORL | Performed by: FAMILY MEDICINE

## 2023-06-28 PROCEDURE — 99309 SBSQ NF CARE MODERATE MDM 30: CPT | Performed by: NURSE PRACTITIONER

## 2023-06-28 NOTE — TELEPHONE ENCOUNTER
[facility] Geriatrics Triage Nurse INR     Provider: NYLA Shelley  Facility: [facility]   Facility Type:  TCU    Caller: Collette  Call Back Number: [phone]  Reason for call: INR  Diagnosis/Goal: A. Fib    Todays INR: 2.75  Last INR 6/27 3.02(5mg).  Home dose:  2.5mg Mon and Fri and 5mg AOD.      Heparin/Lovenox:  No  Currently on ABX?: No  Other interacting medication:  None  Missed or refused doses: No    Verbal Order/Direction given by Provider: Warfarin 2.5mg Mondays and Fridays and 5mg all other days.  Next INR 7/5/23.      Provider Giving Order:  NYLA Shelley    Verbal Order given to: Collette John Petersen, RN

## 2023-06-28 NOTE — LETTER
6/28/2023        RE: Alicia Coates  [address]  [facility] [address]        [facility] GERIATRIC SERVICES  Chief Complaint   Patient presents with     [facility] F/U     [facility] Medical Record Number:  [id number]  Place of Service where encounter took place:  [facility] ([facility]) [[id number]]  Code Status:  No CPR- Do NOT Intubate     HISTORY:      HPI:  Alicia Coates  is 89 year old (9/5/1932) undergoing physical and occupational therapy. She has a history of dementia, atrial fibrillation (on warfarin), hypertension, hypothyroidism, T2DM and presents with generalized weakness and poor PO intake since COVID infection in early June.    Excerpted from records   Presents with worsening generalized weakness, fatigue, and PO intake. Patient tested positive for COVID on 6/8 and underwent a 10-day quarantine at her new [facility]. Since then, she has had progressive weakness and is now unable to rise from a lying or seated position. She has been sleeping much more than usual and eating/drinking very little. Vitals significant for hypertension. Exam with dry mucous membranes, systolic murmur. Labs largely unremarkable, including CBC, BMP, LFTs, lipase, lactate, INR, UA. Troponin 26. TSH mildly elevated 4.95 with T4 wnl. CXR clear. CT head negative for acute findings. Electrolytes and digoxin level reassuring. Echo here showed some mild to moderate abnormalities but unlikely to be playing role in patient's acute sxs. PT/OT recommended TCU placement. Given son's report that patient requires frequent direction and reminders regarding feeding in setting of dementia, suspect presenting sxs largely related to poor PO intake. Also could be slow to recover from recent COVID infection given age, dementia and new living environment. Depression in context of social isolation may also be factor.      Today she is seen to review vital signs, labs, routine visit and to establish care.  She was seen at the bedside sitting up eating  breakfast.  She had eaten her blueberries however she still had a full plate of food.  She denied chest pain shortness of breath cough or congestion.  She was noted to be oriented to self only.  She is not diabetic however per chart review she does not check her blood sugars if she is currently not on medication.  She did have an A1c back March 2022 was 6.0 however her most recent A1c as of 6/25/2023 was 8.4.  She was placed on fingersticks twice daily to monitor blood sugars.  If medications need to be started her family will be notified.  She denies having any pain.  It appears she recently moved into an assisted living in May 2023      ALLERGIES:Neomycin, Nickel, and No clinical screening - see comments    PAST MEDICAL HISTORY:   Past Medical History:   Diagnosis Date     Chronic atrial fibrillation (H)      CKD (chronic kidney disease)     Stage 3a     History of skin cancer      HLD (hyperlipidemia)      HTN (hypertension)      Hypothyroidism      T2DM (type 2 diabetes mellitus) (H)        PAST SURGICAL HISTORY:   has a past surgical history that includes no history of surgery (06/11/2013); Bunionectomy; and Mohs micrographic procedure.    FAMILY HISTORY: family history is not on file.    SOCIAL HISTORY:  reports that she has never smoked. She has never used smokeless tobacco.    ROS:  Constitutional: Negative for activity change, appetite change, fatigue and fever.   HENT: Negative for congestion.    Respiratory: Negative for cough, shortness of breath and wheezing.    Cardiovascular: Negative for chest pain and leg swelling.   Gastrointestinal: Negative for abdominal distention, abdominal pain, constipation, diarrhea and nausea.   Genitourinary: Negative for dysuria.   Musculoskeletal: Negative for arthralgia. Negative for back pain.   Skin: Negative for color change and wound.   Neurological: Negative for dizziness.   Psychiatric/Behavioral: Negative for agitation, behavioral problems and positive confusion.   Oriented to self only    Physical Exam:  Constitutional:       Appearance: Patient is well-developed.   HENT:      Head: Normocephalic.   Eyes:      Conjunctiva/sclera: Conjunctivae normal.   Neck:      Musculoskeletal: Normal range of motion.   Cardiovascular:      Rate and Rhythm: Normal rate and regular rhythm.      Heart sounds: Normal heart sounds. No murmur.   Pulmonary:      Effort: No respiratory distress.      Breath sounds: Normal breath sounds. No wheezing or rales.   Abdominal:      General: Bowel sounds are normal. There is no distension.      Palpations: Abdomen is soft.      Tenderness: There is no abdominal tenderness.   Musculoskeletal:       Normal range of motion.     Skin:General:        Skin is warm.   Neurological:         Mental Status: Patient is alert and oriented to person, place, and time.   Psychiatric:         Behavior: Behavior normal.     Vitals:BP (!) 150/80   Pulse 72   Temp 97.5  F (36.4  C)   Resp 16   Wt 63.1 kg (139 lb 3.2 oz)   SpO2 94%   BMI 22.47 kg/m   and Body mass index is 22.47 kg/m .    Lab/Diagnostic data:   Recent Results (from the past 240 hour(s))   Extra Blue Top Tube    Collection Time: 06/25/23  6:41 PM   Result Value Ref Range    Hold Specimen JIC    Extra Red Top Tube    Collection Time: 06/25/23  6:41 PM   Result Value Ref Range    Hold Specimen JIC    Extra Green Top (Lithium Heparin) Tube    Collection Time: 06/25/23  6:41 PM   Result Value Ref Range    Hold Specimen JIC    Extra Purple Top Tube    Collection Time: 06/25/23  6:41 PM   Result Value Ref Range    Hold Specimen JIC    Basic metabolic panel    Collection Time: 06/25/23  6:41 PM   Result Value Ref Range    Sodium 134 (L) 136 - 145 mmol/L    Potassium 4.0 3.4 - 5.3 mmol/L    Chloride 99 98 - 107 mmol/L    Carbon Dioxide (CO2) 24 22 - 29 mmol/L    Anion Gap 11 7 - 15 mmol/L    Urea Nitrogen 21.7 8.0 - 23.0 mg/dL    Creatinine 0.71 0.51 - 0.95 mg/dL    Calcium 9.6 8.2 - 9.6 mg/dL    Glucose 136  (H) 70 - 99 mg/dL    GFR Estimate 80 >60 mL/min/1.73m2   Hepatic function panel    Collection Time: 06/25/23  6:41 PM   Result Value Ref Range    Protein Total 7.0 6.4 - 8.3 g/dL    Albumin 3.4 (L) 3.5 - 5.2 g/dL    Bilirubin Total 1.3 (H) <=1.2 mg/dL    Alkaline Phosphatase 93 35 - 104 U/L    AST 29 0 - 45 U/L    ALT 19 0 - 50 U/L    Bilirubin Direct 0.42 (H) 0.00 - 0.30 mg/dL   Lipase    Collection Time: 06/25/23  6:41 PM   Result Value Ref Range    Lipase 20 13 - 60 U/L   Troponin T, High Sensitivity    Collection Time: 06/25/23  6:41 PM   Result Value Ref Range    Troponin T, High Sensitivity 26 (H) <=14 ng/L   TSH with free T4 reflex    Collection Time: 06/25/23  6:41 PM   Result Value Ref Range    TSH 4.95 (H) 0.30 - 4.20 uIU/mL   CBC with platelets and differential    Collection Time: 06/25/23  6:41 PM   Result Value Ref Range    WBC Count 9.4 4.0 - 11.0 10e3/uL    RBC Count 4.99 3.80 - 5.20 10e6/uL    Hemoglobin 14.2 11.7 - 15.7 g/dL    Hematocrit 42.7 35.0 - 47.0 %    MCV 86 78 - 100 fL    MCH 28.5 26.5 - 33.0 pg    MCHC 33.3 31.5 - 36.5 g/dL    RDW 14.3 10.0 - 15.0 %    Platelet Count 245 150 - 450 10e3/uL    % Neutrophils 79 %    % Lymphocytes 11 %    % Monocytes 10 %    % Eosinophils 0 %    % Basophils 0 %    % Immature Granulocytes 0 %    NRBCs per 100 WBC 0 <1 /100    Absolute Neutrophils 7.3 1.6 - 8.3 10e3/uL    Absolute Lymphocytes 1.1 0.8 - 5.3 10e3/uL    Absolute Monocytes 0.9 0.0 - 1.3 10e3/uL    Absolute Eosinophils 0.0 0.0 - 0.7 10e3/uL    Absolute Basophils 0.0 0.0 - 0.2 10e3/uL    Absolute Immature Granulocytes 0.0 <=0.4 10e3/uL    Absolute NRBCs 0.0 10e3/uL   T4 free    Collection Time: 06/25/23  6:41 PM   Result Value Ref Range    Free T4 1.28 0.90 - 1.70 ng/dL   INR    Collection Time: 06/25/23  6:41 PM   Result Value Ref Range    INR 2.63 (H) 0.85 - 1.15   Hemoglobin A1c    Collection Time: 06/25/23  6:41 PM   Result Value Ref Range    Hemoglobin A1C 8.4 (H) <5.7 %   Digoxin level     Collection Time: 06/25/23  6:41 PM   Result Value Ref Range    Digoxin 1.2 0.6 - 2.0 ng/mL   Magnesium    Collection Time: 06/25/23  6:41 PM   Result Value Ref Range    Magnesium 1.8 1.7 - 2.3 mg/dL   Lactic acid whole blood    Collection Time: 06/25/23  7:40 PM   Result Value Ref Range    Lactic Acid 1.0 0.7 - 2.0 mmol/L   UA with Microscopic reflex to Culture    Collection Time: 06/25/23  9:05 PM    Specimen: Urine, Catheter   Result Value Ref Range    Color Urine Yellow Colorless, Straw, Light Yellow, Yellow    Appearance Urine Clear Clear    Glucose Urine Negative Negative mg/dL    Bilirubin Urine Negative Negative    Ketones Urine 10 (A) Negative mg/dL    Specific Gravity Urine 1.026 1.001 - 1.030    Blood Urine Negative Negative    pH Urine 5.5 5.0 - 7.0    Protein Albumin Urine 30 (A) Negative mg/dL    Urobilinogen Urine 3.0 (A) <2.0 mg/dL    Nitrite Urine Negative Negative    Leukocyte Esterase Urine Negative Negative    RBC Urine <1 <=2 /HPF    WBC Urine <1 <=5 /HPF    Squamous Epithelials Urine <1 <=1 /HPF   Glucose by meter    Collection Time: 06/25/23 11:38 PM   Result Value Ref Range    GLUCOSE BY METER POCT 120 (H) 70 - 99 mg/dL   Troponin T, High Sensitivity    Collection Time: 06/25/23 11:39 PM   Result Value Ref Range    Troponin T, High Sensitivity 26 (H) <=14 ng/L   INR    Collection Time: 06/26/23  4:31 AM   Result Value Ref Range    INR 3.14 (H) 0.85 - 1.15   Basic metabolic panel    Collection Time: 06/26/23  4:31 AM   Result Value Ref Range    Sodium 135 (L) 136 - 145 mmol/L    Potassium 4.0 3.4 - 5.3 mmol/L    Chloride 101 98 - 107 mmol/L    Carbon Dioxide (CO2) 25 22 - 29 mmol/L    Anion Gap 9 7 - 15 mmol/L    Urea Nitrogen 17.8 8.0 - 23.0 mg/dL    Creatinine 0.71 0.51 - 0.95 mg/dL    Calcium 9.0 8.2 - 9.6 mg/dL    Glucose 117 (H) 70 - 99 mg/dL    GFR Estimate 80 >60 mL/min/1.73m2   CBC with platelets    Collection Time: 06/26/23  4:31 AM   Result Value Ref Range    WBC Count 7.1 4.0 - 11.0  10e3/uL    RBC Count 4.50 3.80 - 5.20 10e6/uL    Hemoglobin 12.8 11.7 - 15.7 g/dL    Hematocrit 39.2 35.0 - 47.0 %    MCV 87 78 - 100 fL    MCH 28.4 26.5 - 33.0 pg    MCHC 32.7 31.5 - 36.5 g/dL    RDW 14.1 10.0 - 15.0 %    Platelet Count 208 150 - 450 10e3/uL   Glucose by meter    Collection Time: 06/26/23  6:29 AM   Result Value Ref Range    GLUCOSE BY METER POCT 108 (H) 70 - 99 mg/dL   Glucose by meter    Collection Time: 06/26/23  7:32 AM   Result Value Ref Range    GLUCOSE BY METER POCT 113 (H) 70 - 99 mg/dL   Echocardiogram Complete    Collection Time: 06/26/23  9:55 AM   Result Value Ref Range    LVEF  60-65%    Glucose by meter    Collection Time: 06/26/23 12:45 PM   Result Value Ref Range    GLUCOSE BY METER POCT 107 (H) 70 - 99 mg/dL   Glucose by meter    Collection Time: 06/26/23  5:05 PM   Result Value Ref Range    GLUCOSE BY METER POCT 158 (H) 70 - 99 mg/dL   Glucose by meter    Collection Time: 06/26/23  6:18 PM   Result Value Ref Range    GLUCOSE BY METER POCT 129 (H) 70 - 99 mg/dL   Glucose by meter    Collection Time: 06/26/23  8:36 PM   Result Value Ref Range    GLUCOSE BY METER POCT 126 (H) 70 - 99 mg/dL   INR    Collection Time: 06/27/23  7:04 AM   Result Value Ref Range    INR 3.02 (H) 0.85 - 1.15   Basic metabolic panel    Collection Time: 06/27/23  7:04 AM   Result Value Ref Range    Sodium 135 (L) 136 - 145 mmol/L    Potassium 3.9 3.4 - 5.3 mmol/L    Chloride 102 98 - 107 mmol/L    Carbon Dioxide (CO2) 25 22 - 29 mmol/L    Anion Gap 8 7 - 15 mmol/L    Urea Nitrogen 13.1 8.0 - 23.0 mg/dL    Creatinine 0.71 0.51 - 0.95 mg/dL    Calcium 9.0 8.2 - 9.6 mg/dL    Glucose 114 (H) 70 - 99 mg/dL    GFR Estimate 80 >60 mL/min/1.73m2   CBC with platelets    Collection Time: 06/27/23  7:04 AM   Result Value Ref Range    WBC Count 7.6 4.0 - 11.0 10e3/uL    RBC Count 4.47 3.80 - 5.20 10e6/uL    Hemoglobin 12.8 11.7 - 15.7 g/dL    Hematocrit 39.1 35.0 - 47.0 %    MCV 88 78 - 100 fL    MCH 28.6 26.5 - 33.0 pg     MCHC 32.7 31.5 - 36.5 g/dL    RDW 14.3 10.0 - 15.0 %    Platelet Count 215 150 - 450 10e3/uL   Glucose by meter    Collection Time: 06/27/23  9:02 AM   Result Value Ref Range    GLUCOSE BY METER POCT 95 70 - 99 mg/dL   Glucose by meter    Collection Time: 06/27/23 12:35 PM   Result Value Ref Range    GLUCOSE BY METER POCT 110 (H) 70 - 99 mg/dL   INR    Collection Time: 06/28/23  5:24 AM   Result Value Ref Range    INR 2.75 (H) 0.85 - 1.15       MEDICATIONS:     Review of your medicines          Accurate as of June 28, 2023  3:59 PM. If you have any questions, ask your nurse or doctor.            CONTINUE these medicines which may have CHANGED, or have new prescriptions. If we are uncertain of the size of tablets/capsules you have at home, strength may be listed as something that might have changed.      Dose / Directions   WARFARIN SODIUM PO  This may have changed: Another medication with the same name was removed. Continue taking this medication, and follow the directions you see here.  Changed by: Ru Betancourt RN      6/28/23 INR 2.75  Cont 2.5mg Mon and Fri and 5mg AOD.  Next INR 7/5/23.  Refills: 0        CONTINUE these medicines which have NOT CHANGED      Dose / Directions   digoxin 250 MCG tablet  Commonly known as: LANOXIN      Dose: 250 mcg  Take 250 mcg by mouth daily.  Refills: 0     levothyroxine 25 MCG tablet  Commonly known as: SYNTHROID/LEVOTHROID      Dose: 25 mcg  Take 25 mcg by mouth daily before breakfast  Refills: 0     lisinopril 20 MG tablet  Commonly known as: ZESTRIL      Dose: 20 mg  Take 20 mg by mouth daily  Refills: 0        STOP taking    multivitamin w/minerals tablet  Stopped by: Soledad Mabry CNP        thiamine 100 MG tablet  Commonly known as: B-1  Stopped by: Soledad Mabry CNP               ASSESSMENT/PLAN  Encounter Diagnoses   Name Primary?     Weakness generalized Yes     Type 2 diabetes mellitus with other specified complication, unspecified whether long term insulin use  (H)      Anticoagulation management encounter      Atrial fibrillation on digoxin, continue Coumadin monitor and adjust per INRs    Physical deconditioning PT OT    Hypothyroidism continue levothyroxine TSH 4.95 on 6/25/23    Type 2 diabetes not on medication A1C on 6/25/23 was 8.4.    Hypertension on lisinopril    Electronically signed by: Soledad Mabry CNP        Sincerely,        Soledad Mabry CNP

## 2023-06-28 NOTE — PROGRESS NOTES
[facility] GERIATRIC SERVICES  Chief Complaint   Patient presents with     [facility] F/U     [place] Medical Record Number:  [id number]  Place of Service where encounter took place:  [facility] ([facility]) [[id number]]  Code Status:  No CPR- Do NOT Intubate     HISTORY:      HPI:  Alicia Coates  is 89 year old (9/5/1932) undergoing physical and occupational therapy. She has a history of dementia, atrial fibrillation (on warfarin), hypertension, hypothyroidism, T2DM and presents with generalized weakness and poor PO intake since COVID infection in early June.    Excerpted from records   Presents with worsening generalized weakness, fatigue, and PO intake. Patient tested positive for COVID on 6/8 and underwent a 10-day quarantine at her new [facility]. Since then, she has had progressive weakness and is now unable to rise from a lying or seated position. She has been sleeping much more than usual and eating/drinking very little. Vitals significant for hypertension. Exam with dry mucous membranes, systolic murmur. Labs largely unremarkable, including CBC, BMP, LFTs, lipase, lactate, INR, UA. Troponin 26. TSH mildly elevated 4.95 with T4 wnl. CXR clear. CT head negative for acute findings. Electrolytes and digoxin level reassuring. Echo here showed some mild to moderate abnormalities but unlikely to be playing role in patient's acute sxs. PT/OT recommended TCU placement. Given son's report that patient requires frequent direction and reminders regarding feeding in setting of dementia, suspect presenting sxs largely related to poor PO intake. Also could be slow to recover from recent COVID infection given age, dementia and new living environment. Depression in context of social isolation may also be factor.      Today she is seen to review vital signs, labs, routine visit and to establish care.  She was seen at the bedside sitting up eating breakfast.  She had eaten her blueberries however she still had a full plate of food.   She denied chest pain shortness of breath cough or congestion.  She was noted to be oriented to self only.  She is not diabetic however per chart review she does not check her blood sugars if she is currently not on medication.  She did have an A1c back March 2022 was 6.0 however her most recent A1c as of 6/25/2023 was 8.4.  She was placed on fingersticks twice daily to monitor blood sugars.  If medications need to be started her family will be notified.  She denies having any pain.  It appears she recently moved into an assisted living in May 2023      ALLERGIES:Neomycin, Nickel, and No clinical screening - see comments    PAST MEDICAL HISTORY:   Past Medical History:   Diagnosis Date     Chronic atrial fibrillation (H)      CKD (chronic kidney disease)     Stage 3a     History of skin cancer      HLD (hyperlipidemia)      HTN (hypertension)      Hypothyroidism      T2DM (type 2 diabetes mellitus) (H)        PAST SURGICAL HISTORY:   has a past surgical history that includes no history of surgery (06/11/2013); Bunionectomy; and Mohs micrographic procedure.    FAMILY HISTORY: family history is not on file.    SOCIAL HISTORY:  reports that she has never smoked. She has never used smokeless tobacco.    ROS:  Constitutional: Negative for activity change, appetite change, fatigue and fever.   HENT: Negative for congestion.    Respiratory: Negative for cough, shortness of breath and wheezing.    Cardiovascular: Negative for chest pain and leg swelling.   Gastrointestinal: Negative for abdominal distention, abdominal pain, constipation, diarrhea and nausea.   Genitourinary: Negative for dysuria.   Musculoskeletal: Negative for arthralgia. Negative for back pain.   Skin: Negative for color change and wound.   Neurological: Negative for dizziness.   Psychiatric/Behavioral: Negative for agitation, behavioral problems and positive confusion.  Oriented to self only    Physical Exam:  Constitutional:       Appearance: Patient  is well-developed.   HENT:      Head: Normocephalic.   Eyes:      Conjunctiva/sclera: Conjunctivae normal.   Neck:      Musculoskeletal: Normal range of motion.   Cardiovascular:      Rate and Rhythm: Normal rate and regular rhythm.      Heart sounds: Normal heart sounds. No murmur.   Pulmonary:      Effort: No respiratory distress.      Breath sounds: Normal breath sounds. No wheezing or rales.   Abdominal:      General: Bowel sounds are normal. There is no distension.      Palpations: Abdomen is soft.      Tenderness: There is no abdominal tenderness.   Musculoskeletal:       Normal range of motion.     Skin:General:        Skin is warm.   Neurological:         Mental Status: Patient is alert and oriented to person, place, and time.   Psychiatric:         Behavior: Behavior normal.     Vitals:BP (!) 150/80   Pulse 72   Temp 97.5  F (36.4  C)   Resp 16   Wt 63.1 kg (139 lb 3.2 oz)   SpO2 94%   BMI 22.47 kg/m   and Body mass index is 22.47 kg/m .    Lab/Diagnostic data:   Recent Results (from the past 240 hour(s))   Extra Blue Top Tube    Collection Time: 06/25/23  6:41 PM   Result Value Ref Range    Hold Specimen JIC    Extra Red Top Tube    Collection Time: 06/25/23  6:41 PM   Result Value Ref Range    Hold Specimen JIC    Extra Green Top (Lithium Heparin) Tube    Collection Time: 06/25/23  6:41 PM   Result Value Ref Range    Hold Specimen JIC    Extra Purple Top Tube    Collection Time: 06/25/23  6:41 PM   Result Value Ref Range    Hold Specimen JIC    Basic metabolic panel    Collection Time: 06/25/23  6:41 PM   Result Value Ref Range    Sodium 134 (L) 136 - 145 mmol/L    Potassium 4.0 3.4 - 5.3 mmol/L    Chloride 99 98 - 107 mmol/L    Carbon Dioxide (CO2) 24 22 - 29 mmol/L    Anion Gap 11 7 - 15 mmol/L    Urea Nitrogen 21.7 8.0 - 23.0 mg/dL    Creatinine 0.71 0.51 - 0.95 mg/dL    Calcium 9.6 8.2 - 9.6 mg/dL    Glucose 136 (H) 70 - 99 mg/dL    GFR Estimate 80 >60 mL/min/1.73m2   Hepatic function panel     Collection Time: 06/25/23  6:41 PM   Result Value Ref Range    Protein Total 7.0 6.4 - 8.3 g/dL    Albumin 3.4 (L) 3.5 - 5.2 g/dL    Bilirubin Total 1.3 (H) <=1.2 mg/dL    Alkaline Phosphatase 93 35 - 104 U/L    AST 29 0 - 45 U/L    ALT 19 0 - 50 U/L    Bilirubin Direct 0.42 (H) 0.00 - 0.30 mg/dL   Lipase    Collection Time: 06/25/23  6:41 PM   Result Value Ref Range    Lipase 20 13 - 60 U/L   Troponin T, High Sensitivity    Collection Time: 06/25/23  6:41 PM   Result Value Ref Range    Troponin T, High Sensitivity 26 (H) <=14 ng/L   TSH with free T4 reflex    Collection Time: 06/25/23  6:41 PM   Result Value Ref Range    TSH 4.95 (H) 0.30 - 4.20 uIU/mL   CBC with platelets and differential    Collection Time: 06/25/23  6:41 PM   Result Value Ref Range    WBC Count 9.4 4.0 - 11.0 10e3/uL    RBC Count 4.99 3.80 - 5.20 10e6/uL    Hemoglobin 14.2 11.7 - 15.7 g/dL    Hematocrit 42.7 35.0 - 47.0 %    MCV 86 78 - 100 fL    MCH 28.5 26.5 - 33.0 pg    MCHC 33.3 31.5 - 36.5 g/dL    RDW 14.3 10.0 - 15.0 %    Platelet Count 245 150 - 450 10e3/uL    % Neutrophils 79 %    % Lymphocytes 11 %    % Monocytes 10 %    % Eosinophils 0 %    % Basophils 0 %    % Immature Granulocytes 0 %    NRBCs per 100 WBC 0 <1 /100    Absolute Neutrophils 7.3 1.6 - 8.3 10e3/uL    Absolute Lymphocytes 1.1 0.8 - 5.3 10e3/uL    Absolute Monocytes 0.9 0.0 - 1.3 10e3/uL    Absolute Eosinophils 0.0 0.0 - 0.7 10e3/uL    Absolute Basophils 0.0 0.0 - 0.2 10e3/uL    Absolute Immature Granulocytes 0.0 <=0.4 10e3/uL    Absolute NRBCs 0.0 10e3/uL   T4 free    Collection Time: 06/25/23  6:41 PM   Result Value Ref Range    Free T4 1.28 0.90 - 1.70 ng/dL   INR    Collection Time: 06/25/23  6:41 PM   Result Value Ref Range    INR 2.63 (H) 0.85 - 1.15   Hemoglobin A1c    Collection Time: 06/25/23  6:41 PM   Result Value Ref Range    Hemoglobin A1C 8.4 (H) <5.7 %   Digoxin level    Collection Time: 06/25/23  6:41 PM   Result Value Ref Range    Digoxin 1.2 0.6 - 2.0  ng/mL   Magnesium    Collection Time: 06/25/23  6:41 PM   Result Value Ref Range    Magnesium 1.8 1.7 - 2.3 mg/dL   Lactic acid whole blood    Collection Time: 06/25/23  7:40 PM   Result Value Ref Range    Lactic Acid 1.0 0.7 - 2.0 mmol/L   UA with Microscopic reflex to Culture    Collection Time: 06/25/23  9:05 PM    Specimen: Urine, Catheter   Result Value Ref Range    Color Urine Yellow Colorless, Straw, Light Yellow, Yellow    Appearance Urine Clear Clear    Glucose Urine Negative Negative mg/dL    Bilirubin Urine Negative Negative    Ketones Urine 10 (A) Negative mg/dL    Specific Gravity Urine 1.026 1.001 - 1.030    Blood Urine Negative Negative    pH Urine 5.5 5.0 - 7.0    Protein Albumin Urine 30 (A) Negative mg/dL    Urobilinogen Urine 3.0 (A) <2.0 mg/dL    Nitrite Urine Negative Negative    Leukocyte Esterase Urine Negative Negative    RBC Urine <1 <=2 /HPF    WBC Urine <1 <=5 /HPF    Squamous Epithelials Urine <1 <=1 /HPF   Glucose by meter    Collection Time: 06/25/23 11:38 PM   Result Value Ref Range    GLUCOSE BY METER POCT 120 (H) 70 - 99 mg/dL   Troponin T, High Sensitivity    Collection Time: 06/25/23 11:39 PM   Result Value Ref Range    Troponin T, High Sensitivity 26 (H) <=14 ng/L   INR    Collection Time: 06/26/23  4:31 AM   Result Value Ref Range    INR 3.14 (H) 0.85 - 1.15   Basic metabolic panel    Collection Time: 06/26/23  4:31 AM   Result Value Ref Range    Sodium 135 (L) 136 - 145 mmol/L    Potassium 4.0 3.4 - 5.3 mmol/L    Chloride 101 98 - 107 mmol/L    Carbon Dioxide (CO2) 25 22 - 29 mmol/L    Anion Gap 9 7 - 15 mmol/L    Urea Nitrogen 17.8 8.0 - 23.0 mg/dL    Creatinine 0.71 0.51 - 0.95 mg/dL    Calcium 9.0 8.2 - 9.6 mg/dL    Glucose 117 (H) 70 - 99 mg/dL    GFR Estimate 80 >60 mL/min/1.73m2   CBC with platelets    Collection Time: 06/26/23  4:31 AM   Result Value Ref Range    WBC Count 7.1 4.0 - 11.0 10e3/uL    RBC Count 4.50 3.80 - 5.20 10e6/uL    Hemoglobin 12.8 11.7 - 15.7 g/dL     Hematocrit 39.2 35.0 - 47.0 %    MCV 87 78 - 100 fL    MCH 28.4 26.5 - 33.0 pg    MCHC 32.7 31.5 - 36.5 g/dL    RDW 14.1 10.0 - 15.0 %    Platelet Count 208 150 - 450 10e3/uL   Glucose by meter    Collection Time: 06/26/23  6:29 AM   Result Value Ref Range    GLUCOSE BY METER POCT 108 (H) 70 - 99 mg/dL   Glucose by meter    Collection Time: 06/26/23  7:32 AM   Result Value Ref Range    GLUCOSE BY METER POCT 113 (H) 70 - 99 mg/dL   Echocardiogram Complete    Collection Time: 06/26/23  9:55 AM   Result Value Ref Range    LVEF  60-65%    Glucose by meter    Collection Time: 06/26/23 12:45 PM   Result Value Ref Range    GLUCOSE BY METER POCT 107 (H) 70 - 99 mg/dL   Glucose by meter    Collection Time: 06/26/23  5:05 PM   Result Value Ref Range    GLUCOSE BY METER POCT 158 (H) 70 - 99 mg/dL   Glucose by meter    Collection Time: 06/26/23  6:18 PM   Result Value Ref Range    GLUCOSE BY METER POCT 129 (H) 70 - 99 mg/dL   Glucose by meter    Collection Time: 06/26/23  8:36 PM   Result Value Ref Range    GLUCOSE BY METER POCT 126 (H) 70 - 99 mg/dL   INR    Collection Time: 06/27/23  7:04 AM   Result Value Ref Range    INR 3.02 (H) 0.85 - 1.15   Basic metabolic panel    Collection Time: 06/27/23  7:04 AM   Result Value Ref Range    Sodium 135 (L) 136 - 145 mmol/L    Potassium 3.9 3.4 - 5.3 mmol/L    Chloride 102 98 - 107 mmol/L    Carbon Dioxide (CO2) 25 22 - 29 mmol/L    Anion Gap 8 7 - 15 mmol/L    Urea Nitrogen 13.1 8.0 - 23.0 mg/dL    Creatinine 0.71 0.51 - 0.95 mg/dL    Calcium 9.0 8.2 - 9.6 mg/dL    Glucose 114 (H) 70 - 99 mg/dL    GFR Estimate 80 >60 mL/min/1.73m2   CBC with platelets    Collection Time: 06/27/23  7:04 AM   Result Value Ref Range    WBC Count 7.6 4.0 - 11.0 10e3/uL    RBC Count 4.47 3.80 - 5.20 10e6/uL    Hemoglobin 12.8 11.7 - 15.7 g/dL    Hematocrit 39.1 35.0 - 47.0 %    MCV 88 78 - 100 fL    MCH 28.6 26.5 - 33.0 pg    MCHC 32.7 31.5 - 36.5 g/dL    RDW 14.3 10.0 - 15.0 %    Platelet Count 215 150 -  450 10e3/uL   Glucose by meter    Collection Time: 06/27/23  9:02 AM   Result Value Ref Range    GLUCOSE BY METER POCT 95 70 - 99 mg/dL   Glucose by meter    Collection Time: 06/27/23 12:35 PM   Result Value Ref Range    GLUCOSE BY METER POCT 110 (H) 70 - 99 mg/dL   INR    Collection Time: 06/28/23  5:24 AM   Result Value Ref Range    INR 2.75 (H) 0.85 - 1.15       MEDICATIONS:     Review of your medicines          Accurate as of June 28, 2023  3:59 PM. If you have any questions, ask your nurse or doctor.            CONTINUE these medicines which may have CHANGED, or have new prescriptions. If we are uncertain of the size of tablets/capsules you have at home, strength may be listed as something that might have changed.      Dose / Directions   WARFARIN SODIUM PO  This may have changed: Another medication with the same name was removed. Continue taking this medication, and follow the directions you see here.  Changed by: Ru Betancourt RN      6/28/23 INR 2.75  Cont 2.5mg Mon and Fri and 5mg AOD.  Next INR 7/5/23.  Refills: 0        CONTINUE these medicines which have NOT CHANGED      Dose / Directions   digoxin 250 MCG tablet  Commonly known as: LANOXIN      Dose: 250 mcg  Take 250 mcg by mouth daily.  Refills: 0     levothyroxine 25 MCG tablet  Commonly known as: SYNTHROID/LEVOTHROID      Dose: 25 mcg  Take 25 mcg by mouth daily before breakfast  Refills: 0     lisinopril 20 MG tablet  Commonly known as: ZESTRIL      Dose: 20 mg  Take 20 mg by mouth daily  Refills: 0        STOP taking    multivitamin w/minerals tablet  Stopped by: Soledad Mabry CNP        thiamine 100 MG tablet  Commonly known as: B-1  Stopped by: Soledad Mabry CNP               ASSESSMENT/PLAN  Encounter Diagnoses   Name Primary?     Weakness generalized Yes     Type 2 diabetes mellitus with other specified complication, unspecified whether long term insulin use (H)      Anticoagulation management encounter      Atrial fibrillation on digoxin,  continue Coumadin monitor and adjust per INRs    Physical deconditioning PT OT    Hypothyroidism continue levothyroxine TSH 4.95 on 6/25/23    Type 2 diabetes not on medication A1C on 6/25/23 was 8.4.    Hypertension on lisinopril    Electronically signed by: Soledad Mabry CNP

## 2023-06-29 ENCOUNTER — TRANSITIONAL CARE UNIT VISIT (OUTPATIENT)
Dept: GERIATRICS | Facility: CLINIC | Age: 88
End: 2023-06-29
Payer: COMMERCIAL

## 2023-06-29 VITALS
HEIGHT: 65 IN | BODY MASS INDEX: 23.16 KG/M2 | TEMPERATURE: 97.5 F | HEART RATE: 75 BPM | WEIGHT: 139 LBS | RESPIRATION RATE: 16 BRPM | DIASTOLIC BLOOD PRESSURE: 88 MMHG | OXYGEN SATURATION: 94 % | SYSTOLIC BLOOD PRESSURE: 175 MMHG

## 2023-06-29 DIAGNOSIS — F03.B0 MODERATE DEMENTIA, UNSPECIFIED DEMENTIA TYPE, UNSPECIFIED WHETHER BEHAVIORAL, PSYCHOTIC, OR MOOD DISTURBANCE OR ANXIETY (H): ICD-10-CM

## 2023-06-29 DIAGNOSIS — I48.20 CHRONIC ATRIAL FIBRILLATION (H): ICD-10-CM

## 2023-06-29 DIAGNOSIS — I71.40 ABDOMINAL AORTIC ANEURYSM (AAA) WITHOUT RUPTURE, UNSPECIFIED PART (H): Primary | ICD-10-CM

## 2023-06-29 DIAGNOSIS — R53.1 WEAKNESS GENERALIZED: ICD-10-CM

## 2023-06-29 DIAGNOSIS — Z51.81 ANTICOAGULATION MANAGEMENT ENCOUNTER: ICD-10-CM

## 2023-06-29 DIAGNOSIS — Z79.01 ANTICOAGULATION MANAGEMENT ENCOUNTER: ICD-10-CM

## 2023-06-29 DIAGNOSIS — N18.31 STAGE 3A CHRONIC KIDNEY DISEASE (H): ICD-10-CM

## 2023-06-29 DIAGNOSIS — R63.0 ANOREXIA: ICD-10-CM

## 2023-06-29 DIAGNOSIS — I10 PRIMARY HYPERTENSION: ICD-10-CM

## 2023-06-29 DIAGNOSIS — E11.69 TYPE 2 DIABETES MELLITUS WITH OTHER SPECIFIED COMPLICATION, UNSPECIFIED WHETHER LONG TERM INSULIN USE (H): ICD-10-CM

## 2023-06-29 LAB
ANION GAP SERPL CALCULATED.3IONS-SCNC: 11 MMOL/L (ref 7–15)
BASOPHILS # BLD AUTO: 0 10E3/UL (ref 0–0.2)
BASOPHILS NFR BLD AUTO: 1 %
BUN SERPL-MCNC: 12 MG/DL (ref 8–23)
CALCIUM SERPL-MCNC: 8.8 MG/DL (ref 8.2–9.6)
CHLORIDE SERPL-SCNC: 104 MMOL/L (ref 98–107)
CREAT SERPL-MCNC: 0.68 MG/DL (ref 0.51–0.95)
DEPRECATED HCO3 PLAS-SCNC: 24 MMOL/L (ref 22–29)
EOSINOPHIL # BLD AUTO: 0.1 10E3/UL (ref 0–0.7)
EOSINOPHIL NFR BLD AUTO: 2 %
ERYTHROCYTE [DISTWIDTH] IN BLOOD BY AUTOMATED COUNT: 14.6 % (ref 10–15)
GFR SERPL CREATININE-BSD FRML MDRD: 82 ML/MIN/1.73M2
GLUCOSE SERPL-MCNC: 88 MG/DL (ref 70–99)
HCT VFR BLD AUTO: 40 % (ref 35–47)
HGB BLD-MCNC: 12.9 G/DL (ref 11.7–15.7)
IMM GRANULOCYTES # BLD: 0 10E3/UL
IMM GRANULOCYTES NFR BLD: 1 %
LYMPHOCYTES # BLD AUTO: 1.3 10E3/UL (ref 0.8–5.3)
LYMPHOCYTES NFR BLD AUTO: 22 %
MAGNESIUM SERPL-MCNC: 1.8 MG/DL (ref 1.7–2.3)
MCH RBC QN AUTO: 28.5 PG (ref 26.5–33)
MCHC RBC AUTO-ENTMCNC: 32.3 G/DL (ref 31.5–36.5)
MCV RBC AUTO: 88 FL (ref 78–100)
MONOCYTES # BLD AUTO: 0.6 10E3/UL (ref 0–1.3)
MONOCYTES NFR BLD AUTO: 11 %
NEUTROPHILS # BLD AUTO: 3.8 10E3/UL (ref 1.6–8.3)
NEUTROPHILS NFR BLD AUTO: 63 %
NRBC # BLD AUTO: 0 10E3/UL
NRBC BLD AUTO-RTO: 0 /100
PLATELET # BLD AUTO: 248 10E3/UL (ref 150–450)
POTASSIUM SERPL-SCNC: 3.8 MMOL/L (ref 3.4–5.3)
RBC # BLD AUTO: 4.53 10E6/UL (ref 3.8–5.2)
SODIUM SERPL-SCNC: 139 MMOL/L (ref 136–145)
WBC # BLD AUTO: 5.8 10E3/UL (ref 4–11)

## 2023-06-29 PROCEDURE — 85025 COMPLETE CBC W/AUTO DIFF WBC: CPT | Mod: ORL | Performed by: NURSE PRACTITIONER

## 2023-06-29 PROCEDURE — 36415 COLL VENOUS BLD VENIPUNCTURE: CPT | Mod: ORL | Performed by: NURSE PRACTITIONER

## 2023-06-29 PROCEDURE — P9604 ONE-WAY ALLOW PRORATED TRIP: HCPCS | Mod: ORL | Performed by: NURSE PRACTITIONER

## 2023-06-29 PROCEDURE — 83735 ASSAY OF MAGNESIUM: CPT | Mod: ORL | Performed by: NURSE PRACTITIONER

## 2023-06-29 PROCEDURE — 99305 1ST NF CARE MODERATE MDM 35: CPT | Performed by: FAMILY MEDICINE

## 2023-06-29 PROCEDURE — 80048 BASIC METABOLIC PNL TOTAL CA: CPT | Mod: ORL | Performed by: NURSE PRACTITIONER

## 2023-06-29 NOTE — LETTER
6/29/2023        RE: Alicia Coates  [address]  [facility] [address]        [facility]       Patient Alicia Coates  MRN: [id number]        Reason for Visit     Chief Complaint   Patient presents with     RECHECK     INITIAL       Code Status     CPR/Full code     Assessment     Generalized weakness persistent  Recent COVID infection on 6/8/2023  Dementia  Dehydration  Chronic lymphedema with stasis dermatitis  Hypertension  A fib  Protein calorie malnutrition  aaa felt to be stable  ckd 3a  dm    Plan     Pt is admitted to TCU for strengthening and rehab.  Patient has recently moved to an assisted living facility where generalized decline weight loss unintentional with protein calorie malnutrition noted.  Admitted to the hospital and extensive work-up and imaging and labs have been nondiagnostic.  Suspected to have some component of depression so mood will be monitored and consider psychology consultation if needed.  Continue supplementation as ordered.  Monitor blood pressures.  Elevated sbp noted today  Monitor mood and behaviors with underlying history of dementia  Compression stockings for her chronic lymphedema with Aquaphor  Has A-fib and continues on digoxin and warfarin with last INR being therapeutic.  Recheck labs done today were reviewed and normal  dms is diet controlled and blood sugar checks in the TCU less than 140 so we will discontinue check and order an A1c in 2 weeks  Continue with PT/OT    History     Patient is a very pleasant 89 year old female who is admitted to TCU  Recently diagnosed with COVID on 6/8/2023.  Subsequently she readmitted to the hospital with poor oral intake with excessive sleeping.  She had extensive work-up done including imaging and labs which were negative for any infectious etiology.  Has underlying history of dementia and remains a poor historian   concerned about management of her lymphedema      Past Medical & Surgical History      PAST MEDICAL HISTORY:   Past Medical History:   Diagnosis Date     Chronic atrial fibrillation (H)      CKD (chronic kidney disease)     Stage 3a     History of skin cancer      HLD (hyperlipidemia)      HTN (hypertension)      Hypothyroidism      T2DM (type 2 diabetes mellitus) (H)       PAST SURGICAL HISTORY:   has a past surgical history that includes no history of surgery (06/11/2013); Bunionectomy; and Mohs micrographic procedure.      Past Social History     Reviewed,  reports that she has never smoked. She has never used smokeless tobacco.    Family History     Reviewed, and family history is not on file.    Medication List     Current Outpatient Medications   Medication     digoxin (LANOXIN) 0.25 MG tablet     levothyroxine (SYNTHROID/LEVOTHROID) 25 MCG tablet     lisinopril (ZESTRIL) 20 MG tablet     WARFARIN SODIUM PO     No current facility-administered medications for this visit.      MED REC REQUIRED  Post Medication Reconciliation Status: discharge medications reconciled, continue medications without change       Allergies     Allergies   Allergen Reactions     Neomycin Unknown     Nickel Unknown     No Clinical Screening - See Comments      black rubber mix        Review of Systems   A comprehensive review of 14 systems was done. Pertinent findings noted here and in history of present illness. All the rest negative.  Constitutional: Negative.  Negative for fever, chills, she has  activity change, appetite change and fatigue.   HENT: Negative for congestion and facial swelling.    Eyes: Negative for photophobia, redness and visual disturbance.   Respiratory: Negative for cough and chest tightness.    Cardiovascular: Negative for chest pain, palpitations and has chronic leg swelling.   Gastrointestinal: Negative for nausea, diarrhea, constipation, blood in stool and abdominal distention.   Genitourinary: Negative.    Musculoskeletal: Negative.  Reports weakness and using a wheelchair  Skin:  "Negative.    Neurological: Negative for dizziness, tremors, syncope, weakness, light-headedness and headaches.   Hematological: Does not bruise/bleed easily.   Psychiatric/Behavioral: Negative.  Has limited recall and insight      Physical Exam   BP (!) 175/88   Pulse 75   Temp 97.5  F (36.4  C)   Resp 16   Ht 1.651 m (5' 5\")   Wt 63 kg (139 lb)   SpO2 94%   BMI 23.13 kg/m       Constitutional: Oriented to person, place,  and appears well-developed.   HEENT:  Normocephalic and atraumatic.  Eyes: Conjunctivae and EOM are normal. Pupils are equal, round, and reactive to light. No discharge.  No scleral icterus. Nose normal. Mouth/Throat: Oropharynx is clear and moist. No oropharyngeal exudate.    NECK: Normal range of motion. Neck supple. No JVD present. No tracheal deviation present. No thyromegaly present.   CARDIOVASCULAR: Normal rate, regular rhythm and intact distal pulses.  Exam reveals no gallop and no friction rub.  Systolic murmur present.  PULMONARY: Effort normal and breath sounds normal. No respiratory distress.No Wheezing or rales.  ABDOMEN: Soft. Bowel sounds are normal. No distension and no mass.  There is no tenderness. There is no rebound and no guarding. No HSM.  MUSCULOSKELETAL: Normal range of motion. Mild kyphosis, no tenderness.  LYMPH NODES: Has no cervical, supraclavicular, axillary and groin adenopathy.   NEUROLOGICAL: Alert and oriented to person, place,  No cranial nerve deficit.  Normal muscle tone. Coordination normal.   GENITOURINARY: Deferred exam.  SKIN: Skin is warm and dry. No rash noted. No erythema. No pallor.   Has stasis dermatitis of both legs  EXTREMITIES: No cyanosis, no clubbing, bilateral lower extremity trace edema. No Deformity.  PSYCHIATRIC: Normal mood, affect and behavior.  Has limited recall      Lab Results     Recent Results (from the past 240 hour(s))   Extra Blue Top Tube    Collection Time: 06/25/23  6:41 PM   Result Value Ref Range    Hold Specimen JIC  "   Extra Red Top Tube    Collection Time: 06/25/23  6:41 PM   Result Value Ref Range    Hold Specimen JIC    Extra Green Top (Lithium Heparin) Tube    Collection Time: 06/25/23  6:41 PM   Result Value Ref Range    Hold Specimen JIC    Extra Purple Top Tube    Collection Time: 06/25/23  6:41 PM   Result Value Ref Range    Hold Specimen JIC    Basic metabolic panel    Collection Time: 06/25/23  6:41 PM   Result Value Ref Range    Sodium 134 (L) 136 - 145 mmol/L    Potassium 4.0 3.4 - 5.3 mmol/L    Chloride 99 98 - 107 mmol/L    Carbon Dioxide (CO2) 24 22 - 29 mmol/L    Anion Gap 11 7 - 15 mmol/L    Urea Nitrogen 21.7 8.0 - 23.0 mg/dL    Creatinine 0.71 0.51 - 0.95 mg/dL    Calcium 9.6 8.2 - 9.6 mg/dL    Glucose 136 (H) 70 - 99 mg/dL    GFR Estimate 80 >60 mL/min/1.73m2   Hepatic function panel    Collection Time: 06/25/23  6:41 PM   Result Value Ref Range    Protein Total 7.0 6.4 - 8.3 g/dL    Albumin 3.4 (L) 3.5 - 5.2 g/dL    Bilirubin Total 1.3 (H) <=1.2 mg/dL    Alkaline Phosphatase 93 35 - 104 U/L    AST 29 0 - 45 U/L    ALT 19 0 - 50 U/L    Bilirubin Direct 0.42 (H) 0.00 - 0.30 mg/dL   Lipase    Collection Time: 06/25/23  6:41 PM   Result Value Ref Range    Lipase 20 13 - 60 U/L   Troponin T, High Sensitivity    Collection Time: 06/25/23  6:41 PM   Result Value Ref Range    Troponin T, High Sensitivity 26 (H) <=14 ng/L   TSH with free T4 reflex    Collection Time: 06/25/23  6:41 PM   Result Value Ref Range    TSH 4.95 (H) 0.30 - 4.20 uIU/mL   CBC with platelets and differential    Collection Time: 06/25/23  6:41 PM   Result Value Ref Range    WBC Count 9.4 4.0 - 11.0 10e3/uL    RBC Count 4.99 3.80 - 5.20 10e6/uL    Hemoglobin 14.2 11.7 - 15.7 g/dL    Hematocrit 42.7 35.0 - 47.0 %    MCV 86 78 - 100 fL    MCH 28.5 26.5 - 33.0 pg    MCHC 33.3 31.5 - 36.5 g/dL    RDW 14.3 10.0 - 15.0 %    Platelet Count 245 150 - 450 10e3/uL    % Neutrophils 79 %    % Lymphocytes 11 %    % Monocytes 10 %    % Eosinophils 0 %    %  Basophils 0 %    % Immature Granulocytes 0 %    NRBCs per 100 WBC 0 <1 /100    Absolute Neutrophils 7.3 1.6 - 8.3 10e3/uL    Absolute Lymphocytes 1.1 0.8 - 5.3 10e3/uL    Absolute Monocytes 0.9 0.0 - 1.3 10e3/uL    Absolute Eosinophils 0.0 0.0 - 0.7 10e3/uL    Absolute Basophils 0.0 0.0 - 0.2 10e3/uL    Absolute Immature Granulocytes 0.0 <=0.4 10e3/uL    Absolute NRBCs 0.0 10e3/uL   T4 free    Collection Time: 06/25/23  6:41 PM   Result Value Ref Range    Free T4 1.28 0.90 - 1.70 ng/dL   INR    Collection Time: 06/25/23  6:41 PM   Result Value Ref Range    INR 2.63 (H) 0.85 - 1.15   Hemoglobin A1c    Collection Time: 06/25/23  6:41 PM   Result Value Ref Range    Hemoglobin A1C 8.4 (H) <5.7 %   Digoxin level    Collection Time: 06/25/23  6:41 PM   Result Value Ref Range    Digoxin 1.2 0.6 - 2.0 ng/mL   Magnesium    Collection Time: 06/25/23  6:41 PM   Result Value Ref Range    Magnesium 1.8 1.7 - 2.3 mg/dL   Lactic acid whole blood    Collection Time: 06/25/23  7:40 PM   Result Value Ref Range    Lactic Acid 1.0 0.7 - 2.0 mmol/L   UA with Microscopic reflex to Culture    Collection Time: 06/25/23  9:05 PM    Specimen: Urine, Catheter   Result Value Ref Range    Color Urine Yellow Colorless, Straw, Light Yellow, Yellow    Appearance Urine Clear Clear    Glucose Urine Negative Negative mg/dL    Bilirubin Urine Negative Negative    Ketones Urine 10 (A) Negative mg/dL    Specific Gravity Urine 1.026 1.001 - 1.030    Blood Urine Negative Negative    pH Urine 5.5 5.0 - 7.0    Protein Albumin Urine 30 (A) Negative mg/dL    Urobilinogen Urine 3.0 (A) <2.0 mg/dL    Nitrite Urine Negative Negative    Leukocyte Esterase Urine Negative Negative    RBC Urine <1 <=2 /HPF    WBC Urine <1 <=5 /HPF    Squamous Epithelials Urine <1 <=1 /HPF   Glucose by meter    Collection Time: 06/25/23 11:38 PM   Result Value Ref Range    GLUCOSE BY METER POCT 120 (H) 70 - 99 mg/dL   Troponin T, High Sensitivity    Collection Time: 06/25/23 11:39 PM    Result Value Ref Range    Troponin T, High Sensitivity 26 (H) <=14 ng/L   INR    Collection Time: 06/26/23  4:31 AM   Result Value Ref Range    INR 3.14 (H) 0.85 - 1.15   Basic metabolic panel    Collection Time: 06/26/23  4:31 AM   Result Value Ref Range    Sodium 135 (L) 136 - 145 mmol/L    Potassium 4.0 3.4 - 5.3 mmol/L    Chloride 101 98 - 107 mmol/L    Carbon Dioxide (CO2) 25 22 - 29 mmol/L    Anion Gap 9 7 - 15 mmol/L    Urea Nitrogen 17.8 8.0 - 23.0 mg/dL    Creatinine 0.71 0.51 - 0.95 mg/dL    Calcium 9.0 8.2 - 9.6 mg/dL    Glucose 117 (H) 70 - 99 mg/dL    GFR Estimate 80 >60 mL/min/1.73m2   CBC with platelets    Collection Time: 06/26/23  4:31 AM   Result Value Ref Range    WBC Count 7.1 4.0 - 11.0 10e3/uL    RBC Count 4.50 3.80 - 5.20 10e6/uL    Hemoglobin 12.8 11.7 - 15.7 g/dL    Hematocrit 39.2 35.0 - 47.0 %    MCV 87 78 - 100 fL    MCH 28.4 26.5 - 33.0 pg    MCHC 32.7 31.5 - 36.5 g/dL    RDW 14.1 10.0 - 15.0 %    Platelet Count 208 150 - 450 10e3/uL   Glucose by meter    Collection Time: 06/26/23  6:29 AM   Result Value Ref Range    GLUCOSE BY METER POCT 108 (H) 70 - 99 mg/dL   Glucose by meter    Collection Time: 06/26/23  7:32 AM   Result Value Ref Range    GLUCOSE BY METER POCT 113 (H) 70 - 99 mg/dL   Echocardiogram Complete    Collection Time: 06/26/23  9:55 AM   Result Value Ref Range    LVEF  60-65%    Glucose by meter    Collection Time: 06/26/23 12:45 PM   Result Value Ref Range    GLUCOSE BY METER POCT 107 (H) 70 - 99 mg/dL   Glucose by meter    Collection Time: 06/26/23  5:05 PM   Result Value Ref Range    GLUCOSE BY METER POCT 158 (H) 70 - 99 mg/dL   Glucose by meter    Collection Time: 06/26/23  6:18 PM   Result Value Ref Range    GLUCOSE BY METER POCT 129 (H) 70 - 99 mg/dL   Glucose by meter    Collection Time: 06/26/23  8:36 PM   Result Value Ref Range    GLUCOSE BY METER POCT 126 (H) 70 - 99 mg/dL   INR    Collection Time: 06/27/23  7:04 AM   Result Value Ref Range    INR 3.02 (H) 0.85  - 1.15   Basic metabolic panel    Collection Time: 06/27/23  7:04 AM   Result Value Ref Range    Sodium 135 (L) 136 - 145 mmol/L    Potassium 3.9 3.4 - 5.3 mmol/L    Chloride 102 98 - 107 mmol/L    Carbon Dioxide (CO2) 25 22 - 29 mmol/L    Anion Gap 8 7 - 15 mmol/L    Urea Nitrogen 13.1 8.0 - 23.0 mg/dL    Creatinine 0.71 0.51 - 0.95 mg/dL    Calcium 9.0 8.2 - 9.6 mg/dL    Glucose 114 (H) 70 - 99 mg/dL    GFR Estimate 80 >60 mL/min/1.73m2   CBC with platelets    Collection Time: 06/27/23  7:04 AM   Result Value Ref Range    WBC Count 7.6 4.0 - 11.0 10e3/uL    RBC Count 4.47 3.80 - 5.20 10e6/uL    Hemoglobin 12.8 11.7 - 15.7 g/dL    Hematocrit 39.1 35.0 - 47.0 %    MCV 88 78 - 100 fL    MCH 28.6 26.5 - 33.0 pg    MCHC 32.7 31.5 - 36.5 g/dL    RDW 14.3 10.0 - 15.0 %    Platelet Count 215 150 - 450 10e3/uL   Glucose by meter    Collection Time: 06/27/23  9:02 AM   Result Value Ref Range    GLUCOSE BY METER POCT 95 70 - 99 mg/dL   Glucose by meter    Collection Time: 06/27/23 12:35 PM   Result Value Ref Range    GLUCOSE BY METER POCT 110 (H) 70 - 99 mg/dL   INR    Collection Time: 06/28/23  5:24 AM   Result Value Ref Range    INR 2.75 (H) 0.85 - 1.15   Basic metabolic panel    Collection Time: 06/29/23  6:35 AM   Result Value Ref Range    Sodium 139 136 - 145 mmol/L    Potassium 3.8 3.4 - 5.3 mmol/L    Chloride 104 98 - 107 mmol/L    Carbon Dioxide (CO2) 24 22 - 29 mmol/L    Anion Gap 11 7 - 15 mmol/L    Urea Nitrogen 12.0 8.0 - 23.0 mg/dL    Creatinine 0.68 0.51 - 0.95 mg/dL    Calcium 8.8 8.2 - 9.6 mg/dL    Glucose 88 70 - 99 mg/dL    GFR Estimate 82 >60 mL/min/1.73m2   Magnesium    Collection Time: 06/29/23  6:35 AM   Result Value Ref Range    Magnesium 1.8 1.7 - 2.3 mg/dL   CBC with platelets and differential    Collection Time: 06/29/23  6:35 AM   Result Value Ref Range    WBC Count 5.8 4.0 - 11.0 10e3/uL    RBC Count 4.53 3.80 - 5.20 10e6/uL    Hemoglobin 12.9 11.7 - 15.7 g/dL    Hematocrit 40.0 35.0 - 47.0 %     MCV 88 78 - 100 fL    MCH 28.5 26.5 - 33.0 pg    MCHC 32.3 31.5 - 36.5 g/dL    RDW 14.6 10.0 - 15.0 %    Platelet Count 248 150 - 450 10e3/uL    % Neutrophils 63 %    % Lymphocytes 22 %    % Monocytes 11 %    % Eosinophils 2 %    % Basophils 1 %    % Immature Granulocytes 1 %    NRBCs per 100 WBC 0 <1 /100    Absolute Neutrophils 3.8 1.6 - 8.3 10e3/uL    Absolute Lymphocytes 1.3 0.8 - 5.3 10e3/uL    Absolute Monocytes 0.6 0.0 - 1.3 10e3/uL    Absolute Eosinophils 0.1 0.0 - 0.7 10e3/uL    Absolute Basophils 0.0 0.0 - 0.2 10e3/uL    Absolute Immature Granulocytes 0.0 <=0.4 10e3/uL    Absolute NRBCs 0.0 10e3/uL           Electronically signed by    Tisha Kelly MD                             Sincerely,        DAWNA Munoz

## 2023-06-29 NOTE — PROGRESS NOTES
[facility] GERIATRIC SERVICES       Patient Alicia Coates  MRN: [id number]        Reason for Visit     Chief Complaint   Patient presents with     RECHECK     INITIAL       Code Status     CPR/Full code     Assessment     Generalized weakness persistent  Recent COVID infection on 6/8/2023  Dementia  Dehydration  Chronic lymphedema with stasis dermatitis  Hypertension  A fib  Protein calorie malnutrition  aaa felt to be stable  ckd 3a  dm    Plan     Pt is admitted to TCU for strengthening and rehab.  Patient has recently moved to an assisted living facility where generalized decline weight loss unintentional with protein calorie malnutrition noted.  Admitted to the hospital and extensive work-up and imaging and labs have been nondiagnostic.  Suspected to have some component of depression so mood will be monitored and consider psychology consultation if needed.  Continue supplementation as ordered.  Monitor blood pressures.  Elevated sbp noted today  Monitor mood and behaviors with underlying history of dementia  Compression stockings for her chronic lymphedema with Aquaphor  Has A-fib and continues on digoxin and warfarin with last INR being therapeutic.  Recheck labs done today were reviewed and normal  dms is diet controlled and blood sugar checks in the TCU less than 140 so we will discontinue check and order an A1c in 2 weeks  Continue with PT/OT    History     Patient is a very pleasant 89 year old female who is admitted to TCU  Recently diagnosed with COVID on 6/8/2023.  Subsequently she readmitted to the hospital with poor oral intake with excessive sleeping.  She had extensive work-up done including imaging and labs which were negative for any infectious etiology.  Has underlying history of dementia and remains a poor historian   concerned about management of her lymphedema      Past Medical & Surgical History     PAST MEDICAL HISTORY:   Past Medical History:   Diagnosis Date     Chronic atrial  fibrillation (H)      CKD (chronic kidney disease)     Stage 3a     History of skin cancer      HLD (hyperlipidemia)      HTN (hypertension)      Hypothyroidism      T2DM (type 2 diabetes mellitus) (H)       PAST SURGICAL HISTORY:   has a past surgical history that includes no history of surgery (06/11/2013); Bunionectomy; and Mohs micrographic procedure.      Past Social History     Reviewed,  reports that she has never smoked. She has never used smokeless tobacco.    Family History     Reviewed, and family history is not on file.    Medication List     Current Outpatient Medications   Medication     digoxin (LANOXIN) 0.25 MG tablet     levothyroxine (SYNTHROID/LEVOTHROID) 25 MCG tablet     lisinopril (ZESTRIL) 20 MG tablet     WARFARIN SODIUM PO     No current facility-administered medications for this visit.      MED REC REQUIRED  Post Medication Reconciliation Status: discharge medications reconciled, continue medications without change       Allergies     Allergies   Allergen Reactions     Neomycin Unknown     Nickel Unknown     No Clinical Screening - See Comments      black rubber mix        Review of Systems   A comprehensive review of 14 systems was done. Pertinent findings noted here and in history of present illness. All the rest negative.  Constitutional: Negative.  Negative for fever, chills, she has  activity change, appetite change and fatigue.   HENT: Negative for congestion and facial swelling.    Eyes: Negative for photophobia, redness and visual disturbance.   Respiratory: Negative for cough and chest tightness.    Cardiovascular: Negative for chest pain, palpitations and has chronic leg swelling.   Gastrointestinal: Negative for nausea, diarrhea, constipation, blood in stool and abdominal distention.   Genitourinary: Negative.    Musculoskeletal: Negative.  Reports weakness and using a wheelchair  Skin: Negative.    Neurological: Negative for dizziness, tremors, syncope, weakness,  "light-headedness and headaches.   Hematological: Does not bruise/bleed easily.   Psychiatric/Behavioral: Negative.  Has limited recall and insight      Physical Exam   BP (!) 175/88   Pulse 75   Temp 97.5  F (36.4  C)   Resp 16   Ht 1.651 m (5' 5\")   Wt 63 kg (139 lb)   SpO2 94%   BMI 23.13 kg/m       Constitutional: Oriented to person, place,  and appears well-developed.   HEENT:  Normocephalic and atraumatic.  Eyes: Conjunctivae and EOM are normal. Pupils are equal, round, and reactive to light. No discharge.  No scleral icterus. Nose normal. Mouth/Throat: Oropharynx is clear and moist. No oropharyngeal exudate.    NECK: Normal range of motion. Neck supple. No JVD present. No tracheal deviation present. No thyromegaly present.   CARDIOVASCULAR: Normal rate, regular rhythm and intact distal pulses.  Exam reveals no gallop and no friction rub.  Systolic murmur present.  PULMONARY: Effort normal and breath sounds normal. No respiratory distress.No Wheezing or rales.  ABDOMEN: Soft. Bowel sounds are normal. No distension and no mass.  There is no tenderness. There is no rebound and no guarding. No HSM.  MUSCULOSKELETAL: Normal range of motion. Mild kyphosis, no tenderness.  LYMPH NODES: Has no cervical, supraclavicular, axillary and groin adenopathy.   NEUROLOGICAL: Alert and oriented to person, place,  No cranial nerve deficit.  Normal muscle tone. Coordination normal.   GENITOURINARY: Deferred exam.  SKIN: Skin is warm and dry. No rash noted. No erythema. No pallor.   Has stasis dermatitis of both legs  EXTREMITIES: No cyanosis, no clubbing, bilateral lower extremity trace edema. No Deformity.  PSYCHIATRIC: Normal mood, affect and behavior.  Has limited recall      Lab Results     Recent Results (from the past 240 hour(s))   Extra Blue Top Tube    Collection Time: 06/25/23  6:41 PM   Result Value Ref Range    Hold Specimen [facility]    Extra Red Top Tube    Collection Time: 06/25/23  6:41 PM   Result Value Ref " Range    Hold Specimen [facility]    Extra Green Top (Lithium Heparin) Tube    Collection Time: 06/25/23  6:41 PM   Result Value Ref Range    Hold Specimen [facility]    Extra Purple Top Tube    Collection Time: 06/25/23  6:41 PM   Result Value Ref Range    Hold Specimen [facility]    Basic metabolic panel    Collection Time: 06/25/23  6:41 PM   Result Value Ref Range    Sodium 134 (L) 136 - 145 mmol/L    Potassium 4.0 3.4 - 5.3 mmol/L    Chloride 99 98 - 107 mmol/L    Carbon Dioxide (CO2) 24 22 - 29 mmol/L    Anion Gap 11 7 - 15 mmol/L    Urea Nitrogen 21.7 8.0 - 23.0 mg/dL    Creatinine 0.71 0.51 - 0.95 mg/dL    Calcium 9.6 8.2 - 9.6 mg/dL    Glucose 136 (H) 70 - 99 mg/dL    GFR Estimate 80 >60 mL/min/1.73m2   Hepatic function panel    Collection Time: 06/25/23  6:41 PM   Result Value Ref Range    Protein Total 7.0 6.4 - 8.3 g/dL    Albumin 3.4 (L) 3.5 - 5.2 g/dL    Bilirubin Total 1.3 (H) <=1.2 mg/dL    Alkaline Phosphatase 93 35 - 104 U/L    AST 29 0 - 45 U/L    ALT 19 0 - 50 U/L    Bilirubin Direct 0.42 (H) 0.00 - 0.30 mg/dL   Lipase    Collection Time: 06/25/23  6:41 PM   Result Value Ref Range    Lipase 20 13 - 60 U/L   Troponin T, High Sensitivity    Collection Time: 06/25/23  6:41 PM   Result Value Ref Range    Troponin T, High Sensitivity 26 (H) <=14 ng/L   TSH with free T4 reflex    Collection Time: 06/25/23  6:41 PM   Result Value Ref Range    TSH 4.95 (H) 0.30 - 4.20 uIU/mL   CBC with platelets and differential    Collection Time: 06/25/23  6:41 PM   Result Value Ref Range    WBC Count 9.4 4.0 - 11.0 10e3/uL    RBC Count 4.99 3.80 - 5.20 10e6/uL    Hemoglobin 14.2 11.7 - 15.7 g/dL    Hematocrit 42.7 35.0 - 47.0 %    MCV 86 78 - 100 fL    MCH 28.5 26.5 - 33.0 pg    MCHC 33.3 31.5 - 36.5 g/dL    RDW 14.3 10.0 - 15.0 %    Platelet Count 245 150 - 450 10e3/uL    % Neutrophils 79 %    % Lymphocytes 11 %    % Monocytes 10 %    % Eosinophils 0 %    % Basophils 0 %    % Immature Granulocytes 0 %    NRBCs per 100 WBC 0 <1 /100     Absolute Neutrophils 7.3 1.6 - 8.3 10e3/uL    Absolute Lymphocytes 1.1 0.8 - 5.3 10e3/uL    Absolute Monocytes 0.9 0.0 - 1.3 10e3/uL    Absolute Eosinophils 0.0 0.0 - 0.7 10e3/uL    Absolute Basophils 0.0 0.0 - 0.2 10e3/uL    Absolute Immature Granulocytes 0.0 <=0.4 10e3/uL    Absolute NRBCs 0.0 10e3/uL   T4 free    Collection Time: 06/25/23  6:41 PM   Result Value Ref Range    Free T4 1.28 0.90 - 1.70 ng/dL   INR    Collection Time: 06/25/23  6:41 PM   Result Value Ref Range    INR 2.63 (H) 0.85 - 1.15   Hemoglobin A1c    Collection Time: 06/25/23  6:41 PM   Result Value Ref Range    Hemoglobin A1C 8.4 (H) <5.7 %   Digoxin level    Collection Time: 06/25/23  6:41 PM   Result Value Ref Range    Digoxin 1.2 0.6 - 2.0 ng/mL   Magnesium    Collection Time: 06/25/23  6:41 PM   Result Value Ref Range    Magnesium 1.8 1.7 - 2.3 mg/dL   Lactic acid whole blood    Collection Time: 06/25/23  7:40 PM   Result Value Ref Range    Lactic Acid 1.0 0.7 - 2.0 mmol/L   UA with Microscopic reflex to Culture    Collection Time: 06/25/23  9:05 PM    Specimen: Urine, Catheter   Result Value Ref Range    Color Urine Yellow Colorless, Straw, Light Yellow, Yellow    Appearance Urine Clear Clear    Glucose Urine Negative Negative mg/dL    Bilirubin Urine Negative Negative    Ketones Urine 10 (A) Negative mg/dL    Specific Gravity Urine 1.026 1.001 - 1.030    Blood Urine Negative Negative    pH Urine 5.5 5.0 - 7.0    Protein Albumin Urine 30 (A) Negative mg/dL    Urobilinogen Urine 3.0 (A) <2.0 mg/dL    Nitrite Urine Negative Negative    Leukocyte Esterase Urine Negative Negative    RBC Urine <1 <=2 /HPF    WBC Urine <1 <=5 /HPF    Squamous Epithelials Urine <1 <=1 /HPF   Glucose by meter    Collection Time: 06/25/23 11:38 PM   Result Value Ref Range    GLUCOSE BY METER POCT 120 (H) 70 - 99 mg/dL   Troponin T, High Sensitivity    Collection Time: 06/25/23 11:39 PM   Result Value Ref Range    Troponin T, High Sensitivity 26 (H) <=14 ng/L    INR    Collection Time: 06/26/23  4:31 AM   Result Value Ref Range    INR 3.14 (H) 0.85 - 1.15   Basic metabolic panel    Collection Time: 06/26/23  4:31 AM   Result Value Ref Range    Sodium 135 (L) 136 - 145 mmol/L    Potassium 4.0 3.4 - 5.3 mmol/L    Chloride 101 98 - 107 mmol/L    Carbon Dioxide (CO2) 25 22 - 29 mmol/L    Anion Gap 9 7 - 15 mmol/L    Urea Nitrogen 17.8 8.0 - 23.0 mg/dL    Creatinine 0.71 0.51 - 0.95 mg/dL    Calcium 9.0 8.2 - 9.6 mg/dL    Glucose 117 (H) 70 - 99 mg/dL    GFR Estimate 80 >60 mL/min/1.73m2   CBC with platelets    Collection Time: 06/26/23  4:31 AM   Result Value Ref Range    WBC Count 7.1 4.0 - 11.0 10e3/uL    RBC Count 4.50 3.80 - 5.20 10e6/uL    Hemoglobin 12.8 11.7 - 15.7 g/dL    Hematocrit 39.2 35.0 - 47.0 %    MCV 87 78 - 100 fL    MCH 28.4 26.5 - 33.0 pg    MCHC 32.7 31.5 - 36.5 g/dL    RDW 14.1 10.0 - 15.0 %    Platelet Count 208 150 - 450 10e3/uL   Glucose by meter    Collection Time: 06/26/23  6:29 AM   Result Value Ref Range    GLUCOSE BY METER POCT 108 (H) 70 - 99 mg/dL   Glucose by meter    Collection Time: 06/26/23  7:32 AM   Result Value Ref Range    GLUCOSE BY METER POCT 113 (H) 70 - 99 mg/dL   Echocardiogram Complete    Collection Time: 06/26/23  9:55 AM   Result Value Ref Range    LVEF  60-65%    Glucose by meter    Collection Time: 06/26/23 12:45 PM   Result Value Ref Range    GLUCOSE BY METER POCT 107 (H) 70 - 99 mg/dL   Glucose by meter    Collection Time: 06/26/23  5:05 PM   Result Value Ref Range    GLUCOSE BY METER POCT 158 (H) 70 - 99 mg/dL   Glucose by meter    Collection Time: 06/26/23  6:18 PM   Result Value Ref Range    GLUCOSE BY METER POCT 129 (H) 70 - 99 mg/dL   Glucose by meter    Collection Time: 06/26/23  8:36 PM   Result Value Ref Range    GLUCOSE BY METER POCT 126 (H) 70 - 99 mg/dL   INR    Collection Time: 06/27/23  7:04 AM   Result Value Ref Range    INR 3.02 (H) 0.85 - 1.15   Basic metabolic panel    Collection Time: 06/27/23  7:04 AM    Result Value Ref Range    Sodium 135 (L) 136 - 145 mmol/L    Potassium 3.9 3.4 - 5.3 mmol/L    Chloride 102 98 - 107 mmol/L    Carbon Dioxide (CO2) 25 22 - 29 mmol/L    Anion Gap 8 7 - 15 mmol/L    Urea Nitrogen 13.1 8.0 - 23.0 mg/dL    Creatinine 0.71 0.51 - 0.95 mg/dL    Calcium 9.0 8.2 - 9.6 mg/dL    Glucose 114 (H) 70 - 99 mg/dL    GFR Estimate 80 >60 mL/min/1.73m2   CBC with platelets    Collection Time: 06/27/23  7:04 AM   Result Value Ref Range    WBC Count 7.6 4.0 - 11.0 10e3/uL    RBC Count 4.47 3.80 - 5.20 10e6/uL    Hemoglobin 12.8 11.7 - 15.7 g/dL    Hematocrit 39.1 35.0 - 47.0 %    MCV 88 78 - 100 fL    MCH 28.6 26.5 - 33.0 pg    MCHC 32.7 31.5 - 36.5 g/dL    RDW 14.3 10.0 - 15.0 %    Platelet Count 215 150 - 450 10e3/uL   Glucose by meter    Collection Time: 06/27/23  9:02 AM   Result Value Ref Range    GLUCOSE BY METER POCT 95 70 - 99 mg/dL   Glucose by meter    Collection Time: 06/27/23 12:35 PM   Result Value Ref Range    GLUCOSE BY METER POCT 110 (H) 70 - 99 mg/dL   INR    Collection Time: 06/28/23  5:24 AM   Result Value Ref Range    INR 2.75 (H) 0.85 - 1.15   Basic metabolic panel    Collection Time: 06/29/23  6:35 AM   Result Value Ref Range    Sodium 139 136 - 145 mmol/L    Potassium 3.8 3.4 - 5.3 mmol/L    Chloride 104 98 - 107 mmol/L    Carbon Dioxide (CO2) 24 22 - 29 mmol/L    Anion Gap 11 7 - 15 mmol/L    Urea Nitrogen 12.0 8.0 - 23.0 mg/dL    Creatinine 0.68 0.51 - 0.95 mg/dL    Calcium 8.8 8.2 - 9.6 mg/dL    Glucose 88 70 - 99 mg/dL    GFR Estimate 82 >60 mL/min/1.73m2   Magnesium    Collection Time: 06/29/23  6:35 AM   Result Value Ref Range    Magnesium 1.8 1.7 - 2.3 mg/dL   CBC with platelets and differential    Collection Time: 06/29/23  6:35 AM   Result Value Ref Range    WBC Count 5.8 4.0 - 11.0 10e3/uL    RBC Count 4.53 3.80 - 5.20 10e6/uL    Hemoglobin 12.9 11.7 - 15.7 g/dL    Hematocrit 40.0 35.0 - 47.0 %    MCV 88 78 - 100 fL    MCH 28.5 26.5 - 33.0 pg    MCHC 32.3 31.5 - 36.5  g/dL    RDW 14.6 10.0 - 15.0 %    Platelet Count 248 150 - 450 10e3/uL    % Neutrophils 63 %    % Lymphocytes 22 %    % Monocytes 11 %    % Eosinophils 2 %    % Basophils 1 %    % Immature Granulocytes 1 %    NRBCs per 100 WBC 0 <1 /100    Absolute Neutrophils 3.8 1.6 - 8.3 10e3/uL    Absolute Lymphocytes 1.3 0.8 - 5.3 10e3/uL    Absolute Monocytes 0.6 0.0 - 1.3 10e3/uL    Absolute Eosinophils 0.1 0.0 - 0.7 10e3/uL    Absolute Basophils 0.0 0.0 - 0.2 10e3/uL    Absolute Immature Granulocytes 0.0 <=0.4 10e3/uL    Absolute NRBCs 0.0 10e3/uL           Electronically signed by    Tisha Kelly MD

## 2023-07-04 ENCOUNTER — LAB REQUISITION (OUTPATIENT)
Dept: LAB | Facility: CLINIC | Age: 88
End: 2023-07-04
Payer: COMMERCIAL

## 2023-07-04 DIAGNOSIS — I48.91 UNSPECIFIED ATRIAL FIBRILLATION (H): ICD-10-CM

## 2023-07-05 ENCOUNTER — TELEPHONE (OUTPATIENT)
Dept: GERIATRICS | Facility: CLINIC | Age: 88
End: 2023-07-05

## 2023-07-05 ENCOUNTER — LAB REQUISITION (OUTPATIENT)
Dept: LAB | Facility: CLINIC | Age: 88
End: 2023-07-05
Payer: COMMERCIAL

## 2023-07-05 DIAGNOSIS — I48.91 UNSPECIFIED ATRIAL FIBRILLATION (H): ICD-10-CM

## 2023-07-05 LAB — INR PPP: 3.59 (ref 0.85–1.15)

## 2023-07-05 PROCEDURE — 85610 PROTHROMBIN TIME: CPT | Mod: ORL | Performed by: FAMILY MEDICINE

## 2023-07-05 PROCEDURE — P9603 ONE-WAY ALLOW PRORATED MILES: HCPCS | Mod: ORL | Performed by: FAMILY MEDICINE

## 2023-07-05 PROCEDURE — 36415 COLL VENOUS BLD VENIPUNCTURE: CPT | Mod: ORL | Performed by: FAMILY MEDICINE

## 2023-07-05 NOTE — TELEPHONE ENCOUNTER
[facility] Geriatrics Triage Nurse INR     Provider: NYLA Shelley  Facility: [facility]   Facility Type:  TCU    Caller: Collette  Call Back Number: [phone]  Reason for call: INR  Diagnosis/Goal: A. Fib    Todays INR: 3.59  Last INR 6/28 2.75(2.5mg Mon and Fri and 5mg AOD), 6/27 3.02(5mg).  Home dose:  2.5mg Mon and Fri and 5mg AOD.      Heparin/Lovenox:  No  Currently on ABX?: No  Other interacting medication:  None  Missed or refused doses: No    Verbal Order/Direction given by Provider: Hold Warfarin on 7/5.  Next INR 7/6/23.      Provider Giving Order:  NYLA Shelley    Verbal Order given to: Collette John Petersen, RN

## 2023-07-06 ENCOUNTER — LAB REQUISITION (OUTPATIENT)
Dept: LAB | Facility: CLINIC | Age: 88
End: 2023-07-06
Payer: COMMERCIAL

## 2023-07-06 ENCOUNTER — TELEPHONE (OUTPATIENT)
Dept: GERIATRICS | Facility: CLINIC | Age: 88
End: 2023-07-06

## 2023-07-06 DIAGNOSIS — Z51.81 ENCOUNTER FOR THERAPEUTIC DRUG LEVEL MONITORING: ICD-10-CM

## 2023-07-06 LAB — INR PPP: 3.38 (ref 0.85–1.15)

## 2023-07-06 PROCEDURE — P9604 ONE-WAY ALLOW PRORATED TRIP: HCPCS | Mod: ORL | Performed by: FAMILY MEDICINE

## 2023-07-06 PROCEDURE — 85610 PROTHROMBIN TIME: CPT | Mod: ORL | Performed by: FAMILY MEDICINE

## 2023-07-06 PROCEDURE — 36415 COLL VENOUS BLD VENIPUNCTURE: CPT | Mod: ORL | Performed by: FAMILY MEDICINE

## 2023-07-06 NOTE — TELEPHONE ENCOUNTER
[facility] Geriatrics Triage Nurse INR     Provider: Tisha Kelly MD  Facility: [facility]   Facility Type:  TCU    Caller: Thea  Call Back Number: [phone]  Reason for call: INR  Diagnosis/Goal: A. Fib    Todays INR: 3.38  Last INR 7/5 3.59(hold), 6/28 2.75(2.5mg Mon and Fri and 5mg AOD), 6/27 3.02(5mg).  Home dose:  2.5mg Mon and Fri and 5mg AOD.     Heparin/Lovenox:  No  Currently on ABX?: No  Other interacting medication:  None  Missed or refused doses: No    Verbal Order/Direction given by Provider: Hold Warfarin on 7/6.  Next INR on 7/7/23.      Provider Giving Order:  Tisha Kelly MD    Verbal Order given to: Thea Betancourt RN

## 2023-07-07 ENCOUNTER — TRANSITIONAL CARE UNIT VISIT (OUTPATIENT)
Dept: GERIATRICS | Facility: CLINIC | Age: 88
End: 2023-07-07
Payer: COMMERCIAL

## 2023-07-07 ENCOUNTER — TELEPHONE (OUTPATIENT)
Dept: GERIATRICS | Facility: CLINIC | Age: 88
End: 2023-07-07

## 2023-07-07 VITALS
DIASTOLIC BLOOD PRESSURE: 72 MMHG | HEART RATE: 79 BPM | BODY MASS INDEX: 22.33 KG/M2 | HEIGHT: 65 IN | WEIGHT: 134 LBS | RESPIRATION RATE: 16 BRPM | SYSTOLIC BLOOD PRESSURE: 145 MMHG | OXYGEN SATURATION: 97 % | TEMPERATURE: 97.1 F

## 2023-07-07 DIAGNOSIS — Z79.01 ANTICOAGULATION MANAGEMENT ENCOUNTER: ICD-10-CM

## 2023-07-07 DIAGNOSIS — R53.1 WEAKNESS GENERALIZED: Primary | ICD-10-CM

## 2023-07-07 DIAGNOSIS — Z51.81 ANTICOAGULATION MANAGEMENT ENCOUNTER: ICD-10-CM

## 2023-07-07 LAB — INR PPP: 2.55 (ref 0.85–1.15)

## 2023-07-07 PROCEDURE — P9604 ONE-WAY ALLOW PRORATED TRIP: HCPCS | Mod: ORL | Performed by: FAMILY MEDICINE

## 2023-07-07 PROCEDURE — 85610 PROTHROMBIN TIME: CPT | Mod: ORL | Performed by: FAMILY MEDICINE

## 2023-07-07 PROCEDURE — 36415 COLL VENOUS BLD VENIPUNCTURE: CPT | Mod: ORL | Performed by: FAMILY MEDICINE

## 2023-07-07 PROCEDURE — 99309 SBSQ NF CARE MODERATE MDM 30: CPT | Performed by: NURSE PRACTITIONER

## 2023-07-07 NOTE — PROGRESS NOTES
[facility] GERIATRIC SERVICES  Chief Complaint   Patient presents with     RECHECK     [place] Medical Record Number:  [id number]  Place of Service where encounter took place: [facility]    Code Status:  No CPR- Do NOT Intubate     HISTORY:      HPI:  Alicia Coates  is 89 year old (9/5/1932) undergoing physical and occupational therapy. She has a history of dementia, atrial fibrillation (on warfarin), hypertension, hypothyroidism, T2DM and presents with generalized weakness and poor PO intake since COVID infection in early June.    Excerpted from records   Presents with worsening generalized weakness, fatigue, and PO intake. Patient tested positive for COVID on 6/8 and underwent a 10-day quarantine at her new [facility]. Since then, she has had progressive weakness and is now unable to rise from a lying or seated position. She has been sleeping much more than usual and eating/drinking very little. Vitals significant for hypertension. Exam with dry mucous membranes, systolic murmur. Labs largely unremarkable, including CBC, BMP, LFTs, lipase, lactate, INR, UA. Troponin 26. TSH mildly elevated 4.95 with T4 wnl. CXR clear. CT head negative for acute findings. Electrolytes and digoxin level reassuring. Echo here showed some mild to moderate abnormalities but unlikely to be playing role in patient's acute sxs. PT/OT recommended TCU placement. Given son's report that patient requires frequent direction and reminders regarding feeding in setting of dementia, suspect presenting sxs largely related to poor PO intake. Also could be slow to recover from recent COVID infection given age, dementia and new living environment. Depression in context of social isolation may also be factor.      Today she is seen to review vital signs, labs, routine visit. She denied chest pain shortness of breath cough or congestion.  She was noted to be oriented to self only however today she also knew the month..  She is diabetic however per  chart review she does not check her blood sugars and she is currently not on medication.  She did have an A1c back March 2022 was 6.0 however her most recent A1c as of 6/25/2023 was 8.4.  She was placed on fingersticks twice daily to monitor blood sugars.  If medications need to be started her family will be notified.  She denies having any pain.  It appears she recently moved into an assisted living in May 2023.  Writer spoke with therapy today who reported she is participating well in therapy and making progress.  She currently resides at [facility] [place]note 2] and her son will be increasing her services.  She is able to toilet dress and feed herself independently.  INR to be checked today and Coumadin to be adjusted.      ALLERGIES:Neomycin, Nickel, and No clinical screening - see comments    PAST MEDICAL HISTORY:   Past Medical History:   Diagnosis Date     Chronic atrial fibrillation (H)      CKD (chronic kidney disease)     Stage 3a     History of skin cancer      HLD (hyperlipidemia)      HTN (hypertension)      Hypothyroidism      T2DM (type 2 diabetes mellitus) (H)        PAST SURGICAL HISTORY:   has a past surgical history that includes no history of surgery (06/11/2013); Bunionectomy; and Mohs micrographic procedure.    FAMILY HISTORY: family history is not on file.    SOCIAL HISTORY:  reports that she has never smoked. She has never used smokeless tobacco.    ROS:  Constitutional: Negative for activity change, appetite change, fatigue and fever.   HENT: Negative for congestion.    Respiratory: Negative for cough, shortness of breath and wheezing.    Cardiovascular: Negative for chest pain and leg swelling.   Gastrointestinal: Negative for abdominal distention, abdominal pain, constipation, diarrhea and nausea.   Genitourinary: Negative for dysuria.   Musculoskeletal: Negative for arthralgia. Negative for back pain.   Skin: Negative for color change and wound.   Neurological: Negative for dizziness.    Psychiatric/Behavioral: Negative for agitation, behavioral problems and positive confusion.  Oriented to self only    Physical Exam:  Constitutional:       Appearance: Patient is well-developed.   HENT:      Head: Normocephalic.   Eyes:      Conjunctiva/sclera: Conjunctivae normal.   Neck:      Musculoskeletal: Normal range of motion.   Cardiovascular:      Rate and Rhythm: Normal rate and regular rhythm.      Heart sounds: Normal heart sounds. No murmur.   Pulmonary:      Effort: No respiratory distress.      Breath sounds: Normal breath sounds. No wheezing or rales.   Abdominal:      General: Bowel sounds are normal. There is no distension.      Palpations: Abdomen is soft.      Tenderness: There is no abdominal tenderness.   Musculoskeletal:       Normal range of motion.     Skin:General:        Skin is warm.   Neurological:         Mental Status: Patient is alert and oriented to person, place, and time.   Psychiatric:         Behavior: Behavior normal.     Vitals:BP (!) 145/72   Pulse 79   Temp 97.1  F (36.2  C)   Resp 16   Wt 60.8 kg (134 lb)   SpO2 97%   BMI 22.30 kg/m   and Body mass index is 22.3 kg/m .    Lab/Diagnostic data:   Recent Results (from the past 240 hour(s))   Glucose by meter    Collection Time: 06/27/23 12:35 PM   Result Value Ref Range    GLUCOSE BY METER POCT 110 (H) 70 - 99 mg/dL   INR    Collection Time: 06/28/23  5:24 AM   Result Value Ref Range    INR 2.75 (H) 0.85 - 1.15   Basic metabolic panel    Collection Time: 06/29/23  6:35 AM   Result Value Ref Range    Sodium 139 136 - 145 mmol/L    Potassium 3.8 3.4 - 5.3 mmol/L    Chloride 104 98 - 107 mmol/L    Carbon Dioxide (CO2) 24 22 - 29 mmol/L    Anion Gap 11 7 - 15 mmol/L    Urea Nitrogen 12.0 8.0 - 23.0 mg/dL    Creatinine 0.68 0.51 - 0.95 mg/dL    Calcium 8.8 8.2 - 9.6 mg/dL    Glucose 88 70 - 99 mg/dL    GFR Estimate 82 >60 mL/min/1.73m2   Magnesium    Collection Time: 06/29/23  6:35 AM   Result Value Ref Range    Magnesium  1.8 1.7 - 2.3 mg/dL   CBC with platelets and differential    Collection Time: 06/29/23  6:35 AM   Result Value Ref Range    WBC Count 5.8 4.0 - 11.0 10e3/uL    RBC Count 4.53 3.80 - 5.20 10e6/uL    Hemoglobin 12.9 11.7 - 15.7 g/dL    Hematocrit 40.0 35.0 - 47.0 %    MCV 88 78 - 100 fL    MCH 28.5 26.5 - 33.0 pg    MCHC 32.3 31.5 - 36.5 g/dL    RDW 14.6 10.0 - 15.0 %    Platelet Count 248 150 - 450 10e3/uL    % Neutrophils 63 %    % Lymphocytes 22 %    % Monocytes 11 %    % Eosinophils 2 %    % Basophils 1 %    % Immature Granulocytes 1 %    NRBCs per 100 WBC 0 <1 /100    Absolute Neutrophils 3.8 1.6 - 8.3 10e3/uL    Absolute Lymphocytes 1.3 0.8 - 5.3 10e3/uL    Absolute Monocytes 0.6 0.0 - 1.3 10e3/uL    Absolute Eosinophils 0.1 0.0 - 0.7 10e3/uL    Absolute Basophils 0.0 0.0 - 0.2 10e3/uL    Absolute Immature Granulocytes 0.0 <=0.4 10e3/uL    Absolute NRBCs 0.0 10e3/uL   INR    Collection Time: 07/05/23  6:47 AM   Result Value Ref Range    INR 3.59 (H) 0.85 - 1.15   INR    Collection Time: 07/06/23 10:02 AM   Result Value Ref Range    INR 3.38 (H) 0.85 - 1.15       MEDICATIONS:     Review of your medicines          Accurate as of July 7, 2023 11:10 AM. If you have any questions, ask your nurse or doctor.            CONTINUE these medicines which have NOT CHANGED      Dose / Directions   digoxin 250 MCG tablet  Commonly known as: LANOXIN      Dose: 250 mcg  Take 250 mcg by mouth daily.  Refills: 0     levothyroxine 25 MCG tablet  Commonly known as: SYNTHROID/LEVOTHROID      Dose: 25 mcg  Take 25 mcg by mouth daily before breakfast  Refills: 0     lisinopril 20 MG tablet  Commonly known as: ZESTRIL      Dose: 20 mg  Take 20 mg by mouth daily  Refills: 0     WARFARIN SODIUM PO      7/6/23 INR 3.38  Hold Warfarin on 7/6.  Next INR 7/7/23.   7/5/23 INR 3.59  Hold Warfarin 7/5.  Next INR 7/6/23.   6/28/23 INR 2.75  Cont 2.5mg Mon and Fri and 5mg AOD.  Next INR 7/5/23.  Refills: 0            ASSESSMENT/PLAN  Encounter  Diagnoses   Name Primary?     Weakness generalized Yes     Anticoagulation management encounter      Atrial fibrillation on digoxin, continue Coumadin monitor and adjust per INRs    Physical deconditioning PT OT    Hypothyroidism continue levothyroxine TSH 4.95 on 6/25/23    Type 2 diabetes not on medication A1C on 6/25/23 was 8.4.    Hypertension on lisinopril    Electronically signed by: Soledad Mabry CNP

## 2023-07-07 NOTE — LETTER
7/7/2023        RE: Alicia Coates  [address]  [facility] [address]        [facility] GERIATRIC SERVICES  Chief Complaint   Patient presents with     RECHECK     [place] Medical Record Number:  [id number]  Place of Service where encounter took place: [facility]    Code Status:  No CPR- Do NOT Intubate     HISTORY:      HPI:  Alicia Coates  is 89 year old (9/5/1932) undergoing physical and occupational therapy. She has a history of dementia, atrial fibrillation (on warfarin), hypertension, hypothyroidism, T2DM and presents with generalized weakness and poor PO intake since COVID infection in early June.    Excerpted from records   Presents with worsening generalized weakness, fatigue, and PO intake. Patient tested positive for COVID on 6/8 and underwent a 10-day quarantine at her new [facility]. Since then, she has had progressive weakness and is now unable to rise from a lying or seated position. She has been sleeping much more than usual and eating/drinking very little. Vitals significant for hypertension. Exam with dry mucous membranes, systolic murmur. Labs largely unremarkable, including CBC, BMP, LFTs, lipase, lactate, INR, UA. Troponin 26. TSH mildly elevated 4.95 with T4 wnl. CXR clear. CT head negative for acute findings. Electrolytes and digoxin level reassuring. Echo here showed some mild to moderate abnormalities but unlikely to be playing role in patient's acute sxs. PT/OT recommended TCU placement. Given son's report that patient requires frequent direction and reminders regarding feeding in setting of dementia, suspect presenting sxs largely related to poor PO intake. Also could be slow to recover from recent COVID infection given age, dementia and new living environment. Depression in context of social isolation may also be factor.      Today she is seen to review vital signs, labs, routine visit. She denied chest pain shortness of breath cough or congestion.  She was noted to be  oriented to self only however today she also knew the month..  She is diabetic however per chart review she does not check her blood sugars and she is currently not on medication.  She did have an A1c back March 2022 was 6.0 however her most recent A1c as of 6/25/2023 was 8.4.  She was placed on fingersticks twice daily to monitor blood sugars.  If medications need to be started her family will be notified.  She denies having any pain.  It appears she recently moved into an assisted living in May 2023.  Writer spoke with therapy today who reported she is participating well in therapy and making progress.  She currently resides at [facility] and her son will be increasing her services.  She is able to toilet dress and feed herself independently.  INR to be checked today and Coumadin to be adjusted.      ALLERGIES:Neomycin, Nickel, and No clinical screening - see comments    PAST MEDICAL HISTORY:   Past Medical History:   Diagnosis Date     Chronic atrial fibrillation (H)      CKD (chronic kidney disease)     Stage 3a     History of skin cancer      HLD (hyperlipidemia)      HTN (hypertension)      Hypothyroidism      T2DM (type 2 diabetes mellitus) (H)        PAST SURGICAL HISTORY:   has a past surgical history that includes no history of surgery (06/11/2013); Bunionectomy; and Mohs micrographic procedure.    FAMILY HISTORY: family history is not on file.    SOCIAL HISTORY:  reports that she has never smoked. She has never used smokeless tobacco.    ROS:  Constitutional: Negative for activity change, appetite change, fatigue and fever.   HENT: Negative for congestion.    Respiratory: Negative for cough, shortness of breath and wheezing.    Cardiovascular: Negative for chest pain and leg swelling.   Gastrointestinal: Negative for abdominal distention, abdominal pain, constipation, diarrhea and nausea.   Genitourinary: Negative for dysuria.   Musculoskeletal: Negative for arthralgia. Negative for back pain.   Skin:  Negative for color change and wound.   Neurological: Negative for dizziness.   Psychiatric/Behavioral: Negative for agitation, behavioral problems and positive confusion.  Oriented to self only    Physical Exam:  Constitutional:       Appearance: Patient is well-developed.   HENT:      Head: Normocephalic.   Eyes:      Conjunctiva/sclera: Conjunctivae normal.   Neck:      Musculoskeletal: Normal range of motion.   Cardiovascular:      Rate and Rhythm: Normal rate and regular rhythm.      Heart sounds: Normal heart sounds. No murmur.   Pulmonary:      Effort: No respiratory distress.      Breath sounds: Normal breath sounds. No wheezing or rales.   Abdominal:      General: Bowel sounds are normal. There is no distension.      Palpations: Abdomen is soft.      Tenderness: There is no abdominal tenderness.   Musculoskeletal:       Normal range of motion.     Skin:General:        Skin is warm.   Neurological:         Mental Status: Patient is alert and oriented to person, place, and time.   Psychiatric:         Behavior: Behavior normal.     Vitals:BP (!) 145/72   Pulse 79   Temp 97.1  F (36.2  C)   Resp 16   Wt 60.8 kg (134 lb)   SpO2 97%   BMI 22.30 kg/m   and Body mass index is 22.3 kg/m .    Lab/Diagnostic data:   Recent Results (from the past 240 hour(s))   Glucose by meter    Collection Time: 06/27/23 12:35 PM   Result Value Ref Range    GLUCOSE BY METER POCT 110 (H) 70 - 99 mg/dL   INR    Collection Time: 06/28/23  5:24 AM   Result Value Ref Range    INR 2.75 (H) 0.85 - 1.15   Basic metabolic panel    Collection Time: 06/29/23  6:35 AM   Result Value Ref Range    Sodium 139 136 - 145 mmol/L    Potassium 3.8 3.4 - 5.3 mmol/L    Chloride 104 98 - 107 mmol/L    Carbon Dioxide (CO2) 24 22 - 29 mmol/L    Anion Gap 11 7 - 15 mmol/L    Urea Nitrogen 12.0 8.0 - 23.0 mg/dL    Creatinine 0.68 0.51 - 0.95 mg/dL    Calcium 8.8 8.2 - 9.6 mg/dL    Glucose 88 70 - 99 mg/dL    GFR Estimate 82 >60 mL/min/1.73m2   Magnesium     Collection Time: 06/29/23  6:35 AM   Result Value Ref Range    Magnesium 1.8 1.7 - 2.3 mg/dL   CBC with platelets and differential    Collection Time: 06/29/23  6:35 AM   Result Value Ref Range    WBC Count 5.8 4.0 - 11.0 10e3/uL    RBC Count 4.53 3.80 - 5.20 10e6/uL    Hemoglobin 12.9 11.7 - 15.7 g/dL    Hematocrit 40.0 35.0 - 47.0 %    MCV 88 78 - 100 fL    MCH 28.5 26.5 - 33.0 pg    MCHC 32.3 31.5 - 36.5 g/dL    RDW 14.6 10.0 - 15.0 %    Platelet Count 248 150 - 450 10e3/uL    % Neutrophils 63 %    % Lymphocytes 22 %    % Monocytes 11 %    % Eosinophils 2 %    % Basophils 1 %    % Immature Granulocytes 1 %    NRBCs per 100 WBC 0 <1 /100    Absolute Neutrophils 3.8 1.6 - 8.3 10e3/uL    Absolute Lymphocytes 1.3 0.8 - 5.3 10e3/uL    Absolute Monocytes 0.6 0.0 - 1.3 10e3/uL    Absolute Eosinophils 0.1 0.0 - 0.7 10e3/uL    Absolute Basophils 0.0 0.0 - 0.2 10e3/uL    Absolute Immature Granulocytes 0.0 <=0.4 10e3/uL    Absolute NRBCs 0.0 10e3/uL   INR    Collection Time: 07/05/23  6:47 AM   Result Value Ref Range    INR 3.59 (H) 0.85 - 1.15   INR    Collection Time: 07/06/23 10:02 AM   Result Value Ref Range    INR 3.38 (H) 0.85 - 1.15       MEDICATIONS:     Review of your medicines          Accurate as of July 7, 2023 11:10 AM. If you have any questions, ask your nurse or doctor.            CONTINUE these medicines which have NOT CHANGED      Dose / Directions   digoxin 250 MCG tablet  Commonly known as: LANOXIN      Dose: 250 mcg  Take 250 mcg by mouth daily.  Refills: 0     levothyroxine 25 MCG tablet  Commonly known as: SYNTHROID/LEVOTHROID      Dose: 25 mcg  Take 25 mcg by mouth daily before breakfast  Refills: 0     lisinopril 20 MG tablet  Commonly known as: ZESTRIL      Dose: 20 mg  Take 20 mg by mouth daily  Refills: 0     WARFARIN SODIUM PO      7/6/23 INR 3.38  Hold Warfarin on 7/6.  Next INR 7/7/23.   7/5/23 INR 3.59  Hold Warfarin 7/5.  Next INR 7/6/23.   6/28/23 INR 2.75  Cont 2.5mg Mon and Fri and 5mg  AOD.  Next INR 7/5/23.  Refills: 0            ASSESSMENT/PLAN  Encounter Diagnoses   Name Primary?     Weakness generalized Yes     Anticoagulation management encounter      Atrial fibrillation on digoxin, continue Coumadin monitor and adjust per INRs    Physical deconditioning PT OT    Hypothyroidism continue levothyroxine TSH 4.95 on 6/25/23    Type 2 diabetes not on medication A1C on 6/25/23 was 8.4.    Hypertension on lisinopril    Electronically signed by: Soledad Mabry CNP      Sincerely,        Soledad Mabry CNP

## 2023-07-07 NOTE — TELEPHONE ENCOUNTER
[facility] Geriatrics Triage Nurse INR     Provider: NYLA Shelley  Facility: [facility]   Facility Type:  TCU    Caller: Collette  Call Back Number: [phone]  Reason for call: INR  Diagnosis/Goal: A. Fib    Todays INR: 2.55  Last INR 7/6 3.38(hold), 7/5 3.59(hold), 6/28 2.75(2.5mg Mon and Fri and 5mg AOD), 6/27 3.02(5mg).  Home dose:  2.5mg Mon and Fri and 5mg AOD.     Heparin/Lovenox:  No  Currently on ABX?: No  Other interacting medication:  None  Missed or refused doses: No    Verbal Order/Direction given by Provider: Warfarin 2.5mg M-W-F and 5mg all other days.  Next INR 7/11/23.      Provider Giving Order:  NYLA Shelley    Verbal Order given to: Collette John Petersen, RN

## 2023-07-10 ENCOUNTER — LAB REQUISITION (OUTPATIENT)
Dept: LAB | Facility: CLINIC | Age: 88
End: 2023-07-10
Payer: COMMERCIAL

## 2023-07-10 DIAGNOSIS — I48.91 UNSPECIFIED ATRIAL FIBRILLATION (H): ICD-10-CM

## 2023-07-11 ENCOUNTER — TELEPHONE (OUTPATIENT)
Dept: GERIATRICS | Facility: CLINIC | Age: 88
End: 2023-07-11

## 2023-07-11 LAB — INR PPP: 2.19 (ref 0.85–1.15)

## 2023-07-11 PROCEDURE — 85610 PROTHROMBIN TIME: CPT | Mod: ORL | Performed by: FAMILY MEDICINE

## 2023-07-11 PROCEDURE — P9604 ONE-WAY ALLOW PRORATED TRIP: HCPCS | Mod: ORL | Performed by: FAMILY MEDICINE

## 2023-07-11 PROCEDURE — 36415 COLL VENOUS BLD VENIPUNCTURE: CPT | Mod: ORL | Performed by: FAMILY MEDICINE

## 2023-07-11 NOTE — TELEPHONE ENCOUNTER
[facility] Geriatrics Triage Nurse INR     Provider: Tisha Kelly MD  Facility: [facility]   Facility Type:  TCU    Caller: Julia  Call Back Number: [phone]  Reason for call: INR  Diagnosis/Goal: A. Fib    Todays INR: 2.19  Last INR 7/7 2.55(2.5mg M-W-F and 5mg AOD), 7/6 3.38(hold), 7/5 3.59(hold), 6/28 2.75(2.5mg Mon and Fri and 5mg AOD), 6/27 3.02(5mg).  Home dose:  2.5mg Mon and Fri and 5mg AOD.     Heparin/Lovenox:  No  Currently on ABX?: No  Other interacting medication:  None  Missed or refused doses: No    Verbal Order/Direction given by Provider: Continue Warfarin 2.5mg M-W-F and 5mg all other days.  Next INR 7/18/23.      Provider Giving Order:  Tisha Kelly MD    Verbal Order given to: Julia Betancourt RN

## 2023-07-12 ENCOUNTER — LAB REQUISITION (OUTPATIENT)
Dept: LAB | Facility: CLINIC | Age: 88
End: 2023-07-12
Payer: COMMERCIAL

## 2023-07-12 DIAGNOSIS — E11.65 TYPE 2 DIABETES MELLITUS WITH HYPERGLYCEMIA (H): ICD-10-CM

## 2023-07-12 DIAGNOSIS — R53.1 WEAKNESS: ICD-10-CM

## 2023-07-13 ENCOUNTER — TELEPHONE (OUTPATIENT)
Dept: GERIATRICS | Facility: CLINIC | Age: 88
End: 2023-07-13

## 2023-07-13 LAB — HBA1C MFR BLD: 7.3 %

## 2023-07-13 PROCEDURE — P9603 ONE-WAY ALLOW PRORATED MILES: HCPCS | Mod: ORL | Performed by: FAMILY MEDICINE

## 2023-07-13 PROCEDURE — 83036 HEMOGLOBIN GLYCOSYLATED A1C: CPT | Mod: ORL | Performed by: FAMILY MEDICINE

## 2023-07-13 PROCEDURE — 36415 COLL VENOUS BLD VENIPUNCTURE: CPT | Mod: ORL | Performed by: FAMILY MEDICINE

## 2023-07-13 NOTE — TELEPHONE ENCOUNTER
[facility] Geriatrics Lab Note     Provider: Tisha Kelly MD  Facility: [facility] Type:  TCU    Allergies   Allergen Reactions     Neomycin Unknown     Nickel Unknown     No Clinical Screening - See Comments      black rubber mix        Labs Reviewed by provider: HgbA1c     Verbal Order/Direction given by Provider: Update NP with results tomorrow.      Provider giving Order:  Tisha Kelly MD    Verbal Order given to: Norma([phone])    Ru Betancourt RN

## 2023-07-17 ENCOUNTER — LAB REQUISITION (OUTPATIENT)
Dept: LAB | Facility: CLINIC | Age: 88
End: 2023-07-17
Payer: COMMERCIAL

## 2023-07-17 ENCOUNTER — DISCHARGE SUMMARY NURSING HOME (OUTPATIENT)
Dept: GERIATRICS | Facility: CLINIC | Age: 88
End: 2023-07-17
Payer: COMMERCIAL

## 2023-07-17 VITALS
BODY MASS INDEX: 21.99 KG/M2 | HEIGHT: 65 IN | OXYGEN SATURATION: 94 % | WEIGHT: 132 LBS | DIASTOLIC BLOOD PRESSURE: 62 MMHG | RESPIRATION RATE: 18 BRPM | TEMPERATURE: 95.9 F | SYSTOLIC BLOOD PRESSURE: 140 MMHG | HEART RATE: 83 BPM

## 2023-07-17 DIAGNOSIS — E11.69 TYPE 2 DIABETES MELLITUS WITH OTHER SPECIFIED COMPLICATION, UNSPECIFIED WHETHER LONG TERM INSULIN USE (H): ICD-10-CM

## 2023-07-17 DIAGNOSIS — R53.1 WEAKNESS GENERALIZED: Primary | ICD-10-CM

## 2023-07-17 DIAGNOSIS — Z79.01 ANTICOAGULATION MANAGEMENT ENCOUNTER: ICD-10-CM

## 2023-07-17 DIAGNOSIS — Z51.81 ANTICOAGULATION MANAGEMENT ENCOUNTER: ICD-10-CM

## 2023-07-17 DIAGNOSIS — Z51.81 ENCOUNTER FOR THERAPEUTIC DRUG LEVEL MONITORING: ICD-10-CM

## 2023-07-17 PROCEDURE — 99316 NF DSCHRG MGMT 30 MIN+: CPT | Performed by: NURSE PRACTITIONER

## 2023-07-17 NOTE — LETTER
7/17/2023        RE: Alicia Coates  [address]  [facility] [address]        [facility]  Chief Complaint   Patient presents with     Discharge Summary [facility] Medical Record Number:  [id number]  Place of Service where encounter took place: [facility]    Code Status:  No CPR- Do NOT Intubate     HISTORY:      HPI:  Alicia Coates  is 89 year old (9/5/1932) undergoing physical and occupational therapy. She has a history of dementia, atrial fibrillation (on warfarin), hypertension, hypothyroidism, T2DM and presents with generalized weakness and poor PO intake since COVID infection in early June.    Excerpted from records   Presents with worsening generalized weakness, fatigue, and PO intake. Patient tested positive for COVID on 6/8 and underwent a 10-day quarantine at her new [facility]. Since then, she has had progressive weakness and is now unable to rise from a lying or seated position. She has been sleeping much more than usual and eating/drinking very little. Vitals significant for hypertension. Exam with dry mucous membranes, systolic murmur. Labs largely unremarkable, including CBC, BMP, LFTs, lipase, lactate, INR, UA. Troponin 26. TSH mildly elevated 4.95 with T4 wnl. CXR clear. CT head negative for acute findings. Electrolytes and digoxin level reassuring. Echo here showed some mild to moderate abnormalities but unlikely to be playing role in patient's acute sxs. PT/OT recommended TCU placement. Given son's report that patient requires frequent direction and reminders regarding feeding in setting of dementia, suspect presenting sxs largely related to poor PO intake. Also could be slow to recover from recent COVID infection given age, dementia and new living environment. Depression in context of social isolation may also be factor.      Today she is seen to review vital signs, labs, routine visit and a face-to-face for discharge.  She will discharge back to [facility] on 7/20/2023 with current medications and treatments.  She will have PT OT home health aide RN.  She will be due for an INR check on 7/18 prior to her discharge.  She denied chest pain shortness of breath cough or congestion.  She was noted to be oriented to self and month only.  She is diabetic however per chart review she does not check her blood sugars and she is currently not on medication.  She did have an A1c back March 2022 was 6.0 however her most recent A1c as of 6/25/2023 was 8.4.  Fingersticks have been stable.   She denies having any pain.  It appears she recently moved into an assisted living in May 2023.  Per therapy she is able to toilet dress and feed herself independently.      ALLERGIES:Neomycin, Nickel, and No clinical screening - see comments    PAST MEDICAL HISTORY:   Past Medical History:   Diagnosis Date     Chronic atrial fibrillation (H)      CKD (chronic kidney disease)     Stage 3a     History of skin cancer      HLD (hyperlipidemia)      HTN (hypertension)      Hypothyroidism      T2DM (type 2 diabetes mellitus) (H)        PAST SURGICAL HISTORY:   has a past surgical history that includes no history of surgery (06/11/2013); Bunionectomy; and Mohs micrographic procedure.    FAMILY HISTORY: family history is not on file.    SOCIAL HISTORY:  reports that she has never smoked. She has never used smokeless tobacco.    ROS:  Constitutional: Negative for activity change, appetite change, fatigue and fever.   HENT: Negative for congestion.    Respiratory: Negative for cough, shortness of breath and wheezing.    Cardiovascular: Negative for chest pain and leg swelling.   Gastrointestinal: Negative for abdominal distention, abdominal pain, constipation, diarrhea and nausea.   Genitourinary: Negative for dysuria.   Musculoskeletal: Negative for arthralgia. Negative for back pain.   Skin: Negative for color change and wound.   Neurological: Negative for dizziness.    Psychiatric/Behavioral: Negative for agitation, behavioral problems and positive confusion.  Oriented to self and month only    Physical Exam:  Constitutional:       Appearance: Patient is well-developed.   HENT:      Head: Normocephalic.   Eyes:      Conjunctiva/sclera: Conjunctivae normal.   Neck:      Musculoskeletal: Normal range of motion.   Cardiovascular:      Rate and Rhythm: Normal rate and regular rhythm.      Heart sounds: Normal heart sounds. No murmur.   Pulmonary:      Effort: No respiratory distress.      Breath sounds: Normal breath sounds. No wheezing or rales.   Abdominal:      General: Bowel sounds are normal. There is no distension.      Palpations: Abdomen is soft.      Tenderness: There is no abdominal tenderness.   Musculoskeletal:       Normal range of motion.     Skin:General:        Skin is warm.   Neurological:         Mental Status: Patient is alert and oriented to person, place, and time.   Psychiatric:         Behavior: Behavior normal.     Vitals:BP (!) 140/62   Pulse 83   Temp (!) 95.9  F (35.5  C)   Resp 18   Wt 59.9 kg (132 lb)   SpO2 94%   BMI 21.97 kg/m   and Body mass index is 21.97 kg/m .    Lab/Diagnostic data:   Recent Results (from the past 240 hour(s))   INR    Collection Time: 07/11/23  7:10 AM   Result Value Ref Range    INR 2.19 (H) 0.85 - 1.15   Hemoglobin A1c    Collection Time: 07/13/23  9:01 AM   Result Value Ref Range    Hemoglobin A1C 7.3 (H) <5.7 %       MEDICATIONS:     Review of your medicines          Accurate as of July 17, 2023  7:34 AM. If you have any questions, ask your nurse or doctor.            CONTINUE these medicines which have NOT CHANGED      Dose / Directions   digoxin 250 MCG tablet  Commonly known as: LANOXIN      Dose: 250 mcg  Take 250 mcg by mouth daily.  Refills: 0     levothyroxine 25 MCG tablet  Commonly known as: SYNTHROID/LEVOTHROID      Dose: 25 mcg  Take 25 mcg by mouth daily before breakfast  Refills: 0     lisinopril 20 MG  tablet  Commonly known as: ZESTRIL      Dose: 20 mg  Take 20 mg by mouth daily  Refills: 0     WARFARIN SODIUM PO      7/11/23 INR 2.19  Cont 2.5mg M-W-F and 5mg AOD.  Next INR 7/18/23.   7/7/23 INR 2.55  Take 2.5mg M-W-F and 5mg AOD.  Next INR 7/11/23.   7/6/23 INR 3.38  Hold Warfarin on 7/6.  Next INR 7/7/23.   7/5/23 INR 3.59  Hold Warfarin 7/5.  Next INR 7/6/23.   6/28/23 INR 2.75  Cont 2.5mg Mon and Fri and 5mg AOD.  Next INR 7/5/23.  Refills: 0            ASSESSMENT/PLAN  Encounter Diagnoses   Name Primary?     Weakness generalized Yes     Anticoagulation management encounter      Type 2 diabetes mellitus with other specified complication, unspecified whether long term insulin use (H)      Atrial fibrillation on digoxin, continue Coumadin monitor and adjust per INRs    Physical deconditioning she will have home care services PT OT home health aide RN    Hypothyroidism continue levothyroxine TSH 4.95 on 6/25/23    Type 2 diabetes not on medication A1C on 6/25/23 was 8.4.    Hypertension on lisinopril      DISCHARGE PLAN/FACE TO FACE:  I certify that services are/were furnished while this patient was under the care of a physician and that a physician or an allowed non-physician practitioner (NPP), had a face-to-face encounter that meets the physician face-to-face encounter requirements. The encounter was in whole, or in part, related to the primary reason for home health. The patient is confined to his/her home and needs intermittent skilled nursing, physical therapy, speech-language pathology, or the continued need for occupational therapy. A plan of care has been established by a physician and is periodically reviewed by a physician.  Date of Face-to-Face Encounter: 7/17/23    I certify that, based on my findings, the following services are medically necessary home health services:PT/OT/HHA/RN    My clinical findings support the need for the above skilled services because: PT/OT for continued strength and  endurance, HHA for assistance with ADL's and RN for VS, Medication management and INR'S    This patient is homebound because:She is deconditioned and easily fatigued following weakness sand recent COVID.  She will continue  home therapy for strengthening     The patient is, or has been, under my care and I have initiated the establishment of the plan of care. This patient will be followed by a physician who will periodically review the plan of care.    Schedule follow up visit with primary care provider within 7 days to reestablish care.    Electronically signed by: Soledad Mabry CNP          Sincerely,        Soledad Mabry CNP       -patient's A1c is 8.8%. Though we are not aiming for tight glycemic control in elderly patients, the A1c goal is 7-8% without hypoglycemic or hyperglycemic excursions  -the patient checks sugars in the morning which may not accurate depict the effect of food on glucose. I've educated daughter and patient to check fingersticks at various times of the day to ensure we are getting full ranges  -would recommend nutrition counseling  -the patient's FS were high yesterday as she was on dextrose for the hyponatremia.  The dextrose has since stopped.  This morning's fingerstick is the only value and additional fingerstick data would help direct inpatient glucose management  -for now, please CHANGE to a consistent carbohydrate diet  -continue with low correction sliding scale with meals and at bedtime  -glucose goals are 100-180.  If values are >180 as the day progresses on a consistent carb diet, can consider small dose of basal insulin  -for discharge recommend [id number] mg BID, Prandin 0.5-1 mg TID with meals (based on fingersticks) and Januvia 25 mg.  The GI side effects of metformin were discussed; GI side effects including possible pancreatitis with Januvia also discussed. Recommend she follow up with outpatient Endocrine  [phone] -patient's A1c is 8.8%. Though we are not aiming for tight glycemic control in elderly patients, the A1c goal is 7-8% without hypoglycemic or hyperglycemic excursions  -the patient checks sugars in the morning which may not accurate depict the effect of food on glucose. I've educated daughter and patient to check fingersticks at various times of the day to ensure we are getting full ranges  -would recommend nutrition counseling  -the patient's FS were high yesterday as she was on dextrose for the hyponatremia.  The dextrose has since stopped.  This morning's fingerstick is the only value and additional fingerstick data would help direct inpatient glucose management  -for now, please CHANGE to a consistent carbohydrate diet  -continue with low correction sliding scale with meals and at bedtime  -glucose goals are 100-180.  If values are >180 as the day progresses on a consistent carb diet, can consider small dose of basal insulin  -for discharge recommend [id number] mg BID, Prandin 0.5-1 mg TID with meals (based on fingersticks) and Januvia 25 mg. STOP glimepiride. The GI side effects of metformin were discussed; GI side effects including possible pancreatitis with Januvia also discussed. Recommend she follow up with outpatient Endocrine  [phone]

## 2023-07-17 NOTE — PROGRESS NOTES
[facility] GERIATRIC SERVICES  Chief Complaint   Patient presents with     Discharge Summary [facility] Medical Record Number:  [id number]  Place of Service where encounter took place: [facility]    Code Status:  No CPR- Do NOT Intubate     HISTORY:      HPI:  Alicia Coates  is 89 year old (9/5/1932) undergoing physical and occupational therapy. She has a history of dementia, atrial fibrillation (on warfarin), hypertension, hypothyroidism, T2DM and presents with generalized weakness and poor PO intake since COVID infection in early June.    Excerpted from records   Presents with worsening generalized weakness, fatigue, and PO intake. Patient tested positive for COVID on 6/8 and underwent a 10-day quarantine at her new [facility]. Since then, she has had progressive weakness and is now unable to rise from a lying or seated position. She has been sleeping much more than usual and eating/drinking very little. Vitals significant for hypertension. Exam with dry mucous membranes, systolic murmur. Labs largely unremarkable, including CBC, BMP, LFTs, lipase, lactate, INR, UA. Troponin 26. TSH mildly elevated 4.95 with T4 wnl. CXR clear. CT head negative for acute findings. Electrolytes and digoxin level reassuring. Echo here showed some mild to moderate abnormalities but unlikely to be playing role in patient's acute sxs. PT/OT recommended TCU placement. Given son's report that patient requires frequent direction and reminders regarding feeding in setting of dementia, suspect presenting sxs largely related to poor PO intake. Also could be slow to recover from recent COVID infection given age, dementia and new living environment. Depression in context of social isolation may also be factor.      Today she is seen to review vital signs, labs, routine visit and a face-to-face for discharge.  She will discharge back to [facility] on 7/20/2023 with current medications and treatments.  She will  have PT OT home health aide RN.  She will be due for an INR check on 7/18 prior to her discharge.  She denied chest pain shortness of breath cough or congestion.  She was noted to be oriented to self and month only.  She is diabetic however per chart review she does not check her blood sugars and she is currently not on medication.  She did have an A1c back March 2022 was 6.0 however her most recent A1c as of 6/25/2023 was 8.4.  Fingersticks have been stable.   She denies having any pain.  It appears she recently moved into an assisted living in May 2023.  Per therapy she is able to toilet dress and feed herself independently.      ALLERGIES:Neomycin, Nickel, and No clinical screening - see comments    PAST MEDICAL HISTORY:   Past Medical History:   Diagnosis Date     Chronic atrial fibrillation (H)      CKD (chronic kidney disease)     Stage 3a     History of skin cancer      HLD (hyperlipidemia)      HTN (hypertension)      Hypothyroidism      T2DM (type 2 diabetes mellitus) (H)        PAST SURGICAL HISTORY:   has a past surgical history that includes no history of surgery (06/11/2013); Bunionectomy; and Mohs micrographic procedure.    FAMILY HISTORY: family history is not on file.    SOCIAL HISTORY:  reports that she has never smoked. She has never used smokeless tobacco.    ROS:  Constitutional: Negative for activity change, appetite change, fatigue and fever.   HENT: Negative for congestion.    Respiratory: Negative for cough, shortness of breath and wheezing.    Cardiovascular: Negative for chest pain and leg swelling.   Gastrointestinal: Negative for abdominal distention, abdominal pain, constipation, diarrhea and nausea.   Genitourinary: Negative for dysuria.   Musculoskeletal: Negative for arthralgia. Negative for back pain.   Skin: Negative for color change and wound.   Neurological: Negative for dizziness.   Psychiatric/Behavioral: Negative for agitation, behavioral problems and positive confusion.   Oriented to self and month only    Physical Exam:  Constitutional:       Appearance: Patient is well-developed.   HENT:      Head: Normocephalic.   Eyes:      Conjunctiva/sclera: Conjunctivae normal.   Neck:      Musculoskeletal: Normal range of motion.   Cardiovascular:      Rate and Rhythm: Normal rate and regular rhythm.      Heart sounds: Normal heart sounds. No murmur.   Pulmonary:      Effort: No respiratory distress.      Breath sounds: Normal breath sounds. No wheezing or rales.   Abdominal:      General: Bowel sounds are normal. There is no distension.      Palpations: Abdomen is soft.      Tenderness: There is no abdominal tenderness.   Musculoskeletal:       Normal range of motion.     Skin:General:        Skin is warm.   Neurological:         Mental Status: Patient is alert and oriented to person, place, and time.   Psychiatric:         Behavior: Behavior normal.     Vitals:BP (!) 140/62   Pulse 83   Temp (!) 95.9  F (35.5  C)   Resp 18   Wt 59.9 kg (132 lb)   SpO2 94%   BMI 21.97 kg/m   and Body mass index is 21.97 kg/m .    Lab/Diagnostic data:   Recent Results (from the past 240 hour(s))   INR    Collection Time: 07/11/23  7:10 AM   Result Value Ref Range    INR 2.19 (H) 0.85 - 1.15   Hemoglobin A1c    Collection Time: 07/13/23  9:01 AM   Result Value Ref Range    Hemoglobin A1C 7.3 (H) <5.7 %       MEDICATIONS:     Review of your medicines          Accurate as of July 17, 2023  7:34 AM. If you have any questions, ask your nurse or doctor.            CONTINUE these medicines which have NOT CHANGED      Dose / Directions   digoxin 250 MCG tablet  Commonly known as: LANOXIN      Dose: 250 mcg  Take 250 mcg by mouth daily.  Refills: 0     levothyroxine 25 MCG tablet  Commonly known as: SYNTHROID/LEVOTHROID      Dose: 25 mcg  Take 25 mcg by mouth daily before breakfast  Refills: 0     lisinopril 20 MG tablet  Commonly known as: ZESTRIL      Dose: 20 mg  Take 20 mg by mouth daily  Refills: 0      WARFARIN SODIUM PO      7/11/23 INR 2.19  Cont 2.5mg M-W-F and 5mg AOD.  Next INR 7/18/23.   7/7/23 INR 2.55  Take 2.5mg M-W-F and 5mg AOD.  Next INR 7/11/23.   7/6/23 INR 3.38  Hold Warfarin on 7/6.  Next INR 7/7/23.   7/5/23 INR 3.59  Hold Warfarin 7/5.  Next INR 7/6/23.   6/28/23 INR 2.75  Cont 2.5mg Mon and Fri and 5mg AOD.  Next INR 7/5/23.  Refills: 0            ASSESSMENT/PLAN  Encounter Diagnoses   Name Primary?     Weakness generalized Yes     Anticoagulation management encounter      Type 2 diabetes mellitus with other specified complication, unspecified whether long term insulin use (H)      Atrial fibrillation on digoxin, continue Coumadin monitor and adjust per INRs    Physical deconditioning she will have home care services PT OT home health aide RN    Hypothyroidism continue levothyroxine TSH 4.95 on 6/25/23    Type 2 diabetes not on medication A1C on 6/25/23 was 8.4.    Hypertension on lisinopril      DISCHARGE PLAN/FACE TO FACE:  I certify that services are/were furnished while this patient was under the care of a physician and that a physician or an allowed non-physician practitioner (NPP), had a face-to-face encounter that meets the physician face-to-face encounter requirements. The encounter was in whole, or in part, related to the primary reason for home health. The patient is confined to his/her home and needs intermittent skilled nursing, physical therapy, speech-language pathology, or the continued need for occupational therapy. A plan of care has been established by a physician and is periodically reviewed by a physician.  Date of Face-to-Face Encounter: 7/17/23    I certify that, based on my findings, the following services are medically necessary home health services:PT/OT/HHA/RN    My clinical findings support the need for the above skilled services because: PT/OT for continued strength and endurance, HHA for assistance with ADL's and RN for VS, Medication management and INR'S    This  patient is homebound because:She is deconditioned and easily fatigued following weakness sand recent COVID.  She will continue  home therapy for strengthening     The patient is, or has been, under my care and I have initiated the establishment of the plan of care. This patient will be followed by a physician who will periodically review the plan of care.    Schedule follow up visit with primary care provider within 7 days to reestablish care.    Electronically signed by: Soledad Mabry CNP

## 2023-07-18 ENCOUNTER — TELEPHONE (OUTPATIENT)
Dept: GERIATRICS | Facility: CLINIC | Age: 88
End: 2023-07-18

## 2023-07-18 LAB — INR PPP: 3.24 (ref 0.85–1.15)

## 2023-07-18 PROCEDURE — P9604 ONE-WAY ALLOW PRORATED TRIP: HCPCS | Mod: ORL | Performed by: FAMILY MEDICINE

## 2023-07-18 PROCEDURE — 36415 COLL VENOUS BLD VENIPUNCTURE: CPT | Mod: ORL | Performed by: FAMILY MEDICINE

## 2023-07-18 PROCEDURE — 85610 PROTHROMBIN TIME: CPT | Mod: ORL | Performed by: FAMILY MEDICINE

## 2023-07-18 NOTE — TELEPHONE ENCOUNTER
[facility] Geriatric[facility] Triage Nurse INR     Provider: NYLA Shelley  Facility: [facility]   Facility Type:  TCU    Caller: Geno  Call Back Number: [phone]  Reason for call: INR  Diagnosis/Goal: A. Fib    Todays INR: 3.24  Last INR 7/11 2.19(2.5mg M-W-F and 5mg AOD), 7/7 2.55(2.5mg M-W-F and 5mg AOD), 7/6 3.38(hold), 7/5 3.59(hold), 6/28 2.75(2.5mg Mon and Fri and 5mg AOD), 6/27 3.02(5mg).  Home dose:  2.5mg Mon and Fri and 5mg AOD.     Heparin/Lovenox:  No  Currently on ABX?: No  Other interacting medication:  None  Missed or refused doses: No    Verbal Order/Direction given by Provider: Hold Warfarin on 7/18, then take 2.5mg daily thereafter.  Check INR on 7/21/23 with home RN.      Provider Giving Order:  NYLA Shelley    Verbal Order given to: Geno Betancourt RN

## 2023-09-19 ENCOUNTER — LAB REQUISITION (OUTPATIENT)
Dept: LAB | Facility: CLINIC | Age: 88
End: 2023-09-19
Payer: COMMERCIAL

## 2023-09-19 DIAGNOSIS — E03.9 HYPOTHYROIDISM, UNSPECIFIED: ICD-10-CM

## 2023-09-19 DIAGNOSIS — I48.91 UNSPECIFIED ATRIAL FIBRILLATION (H): ICD-10-CM

## 2023-09-19 DIAGNOSIS — E46 UNSPECIFIED PROTEIN-CALORIE MALNUTRITION (H): ICD-10-CM

## 2023-09-20 LAB
ALBUMIN SERPL BCG-MCNC: 3.9 G/DL (ref 3.5–5.2)
ALP SERPL-CCNC: 71 U/L (ref 35–104)
ALT SERPL W P-5'-P-CCNC: 17 U/L (ref 0–50)
ANION GAP SERPL CALCULATED.3IONS-SCNC: 11 MMOL/L (ref 7–15)
AST SERPL W P-5'-P-CCNC: 25 U/L (ref 0–45)
BILIRUB SERPL-MCNC: 0.8 MG/DL
BUN SERPL-MCNC: 20.7 MG/DL (ref 8–23)
CALCIUM SERPL-MCNC: 9.6 MG/DL (ref 8.2–9.6)
CHLORIDE SERPL-SCNC: 101 MMOL/L (ref 98–107)
CREAT SERPL-MCNC: 0.83 MG/DL (ref 0.51–0.95)
DEPRECATED HCO3 PLAS-SCNC: 27 MMOL/L (ref 22–29)
EGFRCR SERPLBLD CKD-EPI 2021: 66 ML/MIN/1.73M2
GLUCOSE SERPL-MCNC: 143 MG/DL (ref 70–99)
INR PPP: 1.79 (ref 0.85–1.15)
POTASSIUM SERPL-SCNC: 4 MMOL/L (ref 3.4–5.3)
PROT SERPL-MCNC: 7.1 G/DL (ref 6.4–8.3)
SODIUM SERPL-SCNC: 139 MMOL/L (ref 136–145)
T4 FREE SERPL-MCNC: 1.36 NG/DL (ref 0.9–1.7)
TSH SERPL DL<=0.005 MIU/L-ACNC: 7.61 UIU/ML (ref 0.3–4.2)

## 2023-09-20 PROCEDURE — P9603 ONE-WAY ALLOW PRORATED MILES: HCPCS | Mod: ORL | Performed by: FAMILY MEDICINE

## 2023-09-20 PROCEDURE — 80053 COMPREHEN METABOLIC PANEL: CPT | Mod: ORL | Performed by: FAMILY MEDICINE

## 2023-09-20 PROCEDURE — 84443 ASSAY THYROID STIM HORMONE: CPT | Mod: ORL | Performed by: FAMILY MEDICINE

## 2023-09-20 PROCEDURE — 85610 PROTHROMBIN TIME: CPT | Mod: ORL | Performed by: FAMILY MEDICINE

## 2023-09-20 PROCEDURE — 36415 COLL VENOUS BLD VENIPUNCTURE: CPT | Mod: ORL | Performed by: FAMILY MEDICINE

## 2023-09-20 PROCEDURE — 84439 ASSAY OF FREE THYROXINE: CPT | Mod: ORL | Performed by: FAMILY MEDICINE

## 2024-01-01 ENCOUNTER — APPOINTMENT (OUTPATIENT)
Dept: PHYSICAL THERAPY | Facility: HOSPITAL | Age: 89
DRG: 065 | End: 2024-01-01
Payer: COMMERCIAL

## 2024-01-01 ENCOUNTER — APPOINTMENT (OUTPATIENT)
Dept: PHYSICAL THERAPY | Facility: HOSPITAL | Age: 89
DRG: 394 | End: 2024-01-01
Payer: COMMERCIAL

## 2024-01-01 ENCOUNTER — LAB REQUISITION (OUTPATIENT)
Dept: LAB | Facility: CLINIC | Age: 89
End: 2024-01-01
Payer: COMMERCIAL

## 2024-01-01 ENCOUNTER — APPOINTMENT (OUTPATIENT)
Dept: RADIOLOGY | Facility: HOSPITAL | Age: 89
DRG: 394 | End: 2024-01-01
Attending: EMERGENCY MEDICINE
Payer: COMMERCIAL

## 2024-01-01 ENCOUNTER — APPOINTMENT (OUTPATIENT)
Dept: OCCUPATIONAL THERAPY | Facility: HOSPITAL | Age: 89
DRG: 394 | End: 2024-01-01
Payer: COMMERCIAL

## 2024-01-01 ENCOUNTER — PATIENT OUTREACH (OUTPATIENT)
Dept: CARE COORDINATION | Facility: CLINIC | Age: 89
End: 2024-01-01
Payer: COMMERCIAL

## 2024-01-01 ENCOUNTER — APPOINTMENT (OUTPATIENT)
Dept: OCCUPATIONAL THERAPY | Facility: HOSPITAL | Age: 89
DRG: 065 | End: 2024-01-01
Payer: COMMERCIAL

## 2024-01-01 ENCOUNTER — HOSPITAL ENCOUNTER (INPATIENT)
Facility: HOSPITAL | Age: 89
LOS: 1 days | Discharge: HOSPICE | DRG: 394 | End: 2024-08-13
Attending: EMERGENCY MEDICINE | Admitting: FAMILY MEDICINE
Payer: COMMERCIAL

## 2024-01-01 ENCOUNTER — APPOINTMENT (OUTPATIENT)
Dept: CARDIOLOGY | Facility: HOSPITAL | Age: 89
DRG: 065 | End: 2024-01-01
Payer: COMMERCIAL

## 2024-01-01 ENCOUNTER — APPOINTMENT (OUTPATIENT)
Dept: MRI IMAGING | Facility: HOSPITAL | Age: 89
DRG: 065 | End: 2024-01-01
Payer: COMMERCIAL

## 2024-01-01 ENCOUNTER — APPOINTMENT (OUTPATIENT)
Dept: SPEECH THERAPY | Facility: HOSPITAL | Age: 89
DRG: 065 | End: 2024-01-01
Payer: COMMERCIAL

## 2024-01-01 ENCOUNTER — APPOINTMENT (OUTPATIENT)
Dept: CT IMAGING | Facility: HOSPITAL | Age: 89
End: 2024-01-01
Attending: EMERGENCY MEDICINE
Payer: COMMERCIAL

## 2024-01-01 ENCOUNTER — APPOINTMENT (OUTPATIENT)
Dept: CT IMAGING | Facility: HOSPITAL | Age: 89
DRG: 065 | End: 2024-01-01
Attending: PSYCHIATRY & NEUROLOGY
Payer: COMMERCIAL

## 2024-01-01 ENCOUNTER — APPOINTMENT (OUTPATIENT)
Dept: CT IMAGING | Facility: HOSPITAL | Age: 89
DRG: 065 | End: 2024-01-01
Payer: COMMERCIAL

## 2024-01-01 ENCOUNTER — APPOINTMENT (OUTPATIENT)
Dept: RADIOLOGY | Facility: HOSPITAL | Age: 89
DRG: 065 | End: 2024-01-01
Payer: COMMERCIAL

## 2024-01-01 ENCOUNTER — MEDICAL CORRESPONDENCE (OUTPATIENT)
Dept: HEALTH INFORMATION MANAGEMENT | Facility: CLINIC | Age: 89
End: 2024-01-01

## 2024-01-01 ENCOUNTER — APPOINTMENT (OUTPATIENT)
Dept: CT IMAGING | Facility: HOSPITAL | Age: 89
DRG: 394 | End: 2024-01-01
Attending: EMERGENCY MEDICINE
Payer: COMMERCIAL

## 2024-01-01 ENCOUNTER — HOSPITAL ENCOUNTER (INPATIENT)
Facility: HOSPITAL | Age: 89
LOS: 4 days | Discharge: HOME HEALTH | DRG: 065 | End: 2024-07-23
Attending: STUDENT IN AN ORGANIZED HEALTH CARE EDUCATION/TRAINING PROGRAM | Admitting: FAMILY MEDICINE
Payer: COMMERCIAL

## 2024-01-01 ENCOUNTER — HOSPITAL ENCOUNTER (EMERGENCY)
Facility: HOSPITAL | Age: 89
Discharge: HOME | End: 2024-08-06
Attending: EMERGENCY MEDICINE | Admitting: EMERGENCY MEDICINE
Payer: COMMERCIAL

## 2024-01-01 VITALS
SYSTOLIC BLOOD PRESSURE: 189 MMHG | TEMPERATURE: 98.3 F | HEART RATE: 55 BPM | OXYGEN SATURATION: 95 % | DIASTOLIC BLOOD PRESSURE: 81 MMHG | BODY MASS INDEX: 20.45 KG/M2 | RESPIRATION RATE: 18 BRPM | WEIGHT: 130.29 LBS | HEIGHT: 67 IN

## 2024-01-01 VITALS
OXYGEN SATURATION: 94 % | SYSTOLIC BLOOD PRESSURE: 129 MMHG | HEART RATE: 72 BPM | DIASTOLIC BLOOD PRESSURE: 66 MMHG | WEIGHT: 130 LBS | HEIGHT: 64 IN | RESPIRATION RATE: 18 BRPM | BODY MASS INDEX: 22.2 KG/M2 | TEMPERATURE: 97.7 F

## 2024-01-01 VITALS
TEMPERATURE: 97.6 F | WEIGHT: 130 LBS | SYSTOLIC BLOOD PRESSURE: 124 MMHG | BODY MASS INDEX: 21.66 KG/M2 | HEART RATE: 61 BPM | RESPIRATION RATE: 29 BRPM | HEIGHT: 65 IN | DIASTOLIC BLOOD PRESSURE: 59 MMHG | OXYGEN SATURATION: 95 %

## 2024-01-01 DIAGNOSIS — R79.1 ABNORMAL COAGULATION PROFILE: ICD-10-CM

## 2024-01-01 DIAGNOSIS — I48.20 CHRONIC A-FIB (H): ICD-10-CM

## 2024-01-01 DIAGNOSIS — R79.0 ABNORMAL LEVEL OF BLOOD MINERAL: ICD-10-CM

## 2024-01-01 DIAGNOSIS — Z79.01 ANTICOAGULATED BY ANTICOAGULATION TREATMENT: ICD-10-CM

## 2024-01-01 DIAGNOSIS — R19.7 DIARRHEA, UNSPECIFIED TYPE: ICD-10-CM

## 2024-01-01 DIAGNOSIS — R79.9 ABNORMAL FINDING OF BLOOD CHEMISTRY, UNSPECIFIED: ICD-10-CM

## 2024-01-01 DIAGNOSIS — S09.90XA CLOSED HEAD INJURY, INITIAL ENCOUNTER: ICD-10-CM

## 2024-01-01 DIAGNOSIS — I12.9 HYPERTENSIVE CHRONIC KIDNEY DISEASE WITH STAGE 1 THROUGH STAGE 4 CHRONIC KIDNEY DISEASE, OR UNSPECIFIED CHRONIC KIDNEY DISEASE: ICD-10-CM

## 2024-01-01 DIAGNOSIS — N17.9 AKI (ACUTE KIDNEY INJURY) (H): ICD-10-CM

## 2024-01-01 DIAGNOSIS — R78.89 ELEVATED DIGOXIN LEVEL: ICD-10-CM

## 2024-01-01 DIAGNOSIS — E03.9 HYPOTHYROIDISM, UNSPECIFIED: ICD-10-CM

## 2024-01-01 DIAGNOSIS — I63.9 OCCIPITAL INFARCTION (H): ICD-10-CM
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
## 2024-01-01 LAB
ALBUMIN SERPL BCG-MCNC: 3.6 G/DL (ref 3.5–5.2)
ALBUMIN UR-MCNC: NEGATIVE MG/DL
ALP SERPL-CCNC: 76 U/L (ref 40–150)
ALT SERPL W P-5'-P-CCNC: 20 U/L (ref 0–50)
ANION GAP SERPL CALCULATED.3IONS-SCNC: 10 MMOL/L (ref 7–15)
ANION GAP SERPL CALCULATED.3IONS-SCNC: 10 MMOL/L (ref 7–15)
ANION GAP SERPL CALCULATED.3IONS-SCNC: 11 MMOL/L (ref 7–15)
ANION GAP SERPL CALCULATED.3IONS-SCNC: 12 MMOL/L (ref 7–15)
ANION GAP SERPL CALCULATED.3IONS-SCNC: 13 MMOL/L (ref 7–15)
ANION GAP SERPL CALCULATED.3IONS-SCNC: 13 MMOL/L (ref 7–15)
ANION GAP SERPL CALCULATED.3IONS-SCNC: 14 MMOL/L (ref 7–15)
ANION GAP SERPL CALCULATED.3IONS-SCNC: 14 MMOL/L (ref 7–15)
ANION GAP SERPL CALCULATED.3IONS-SCNC: 8 MMOL/L (ref 7–15)
ANION GAP SERPL CALCULATED.3IONS-SCNC: 9 MMOL/L (ref 7–15)
APPEARANCE UR: CLEAR
AST SERPL W P-5'-P-CCNC: 36 U/L (ref 0–45)
ATRIAL RATE - MUSE: 258 BPM
ATRIAL RATE - MUSE: 288 BPM
ATRIAL RATE - MUSE: 394 BPM
ATRIAL RATE - MUSE: 88 BPM
BACTERIA UR CULT: NORMAL
BASOPHILS # BLD AUTO: 0 10E3/UL (ref 0–0.2)
BASOPHILS # BLD AUTO: 0.1 10E3/UL (ref 0–0.2)
BASOPHILS # BLD AUTO: 0.1 10E3/UL (ref 0–0.2)
BASOPHILS NFR BLD AUTO: 0 %
BASOPHILS NFR BLD AUTO: 1 %
BASOPHILS NFR BLD AUTO: 1 %
BILIRUB DIRECT SERPL-MCNC: 0.35 MG/DL (ref 0–0.3)
BILIRUB SERPL-MCNC: 1.1 MG/DL
BILIRUB UR QL STRIP: NEGATIVE
BUN SERPL-MCNC: 14.5 MG/DL (ref 8–23)
BUN SERPL-MCNC: 16.7 MG/DL (ref 8–23)
BUN SERPL-MCNC: 17 MG/DL (ref 8–23)
BUN SERPL-MCNC: 18.2 MG/DL (ref 8–23)
BUN SERPL-MCNC: 19.5 MG/DL (ref 8–23)
BUN SERPL-MCNC: 22.9 MG/DL (ref 8–23)
BUN SERPL-MCNC: 23.4 MG/DL (ref 8–23)
BUN SERPL-MCNC: 24.5 MG/DL (ref 8–23)
BUN SERPL-MCNC: 26.5 MG/DL (ref 8–23)
BUN SERPL-MCNC: 28.4 MG/DL (ref 8–23)
CALCIUM SERPL-MCNC: 8.5 MG/DL (ref 8.8–10.4)
CALCIUM SERPL-MCNC: 8.6 MG/DL (ref 8.8–10.4)
CALCIUM SERPL-MCNC: 8.7 MG/DL (ref 8.8–10.4)
CALCIUM SERPL-MCNC: 9 MG/DL (ref 8.8–10.4)
CALCIUM SERPL-MCNC: 9.1 MG/DL (ref 8.8–10.4)
CALCIUM SERPL-MCNC: 9.1 MG/DL (ref 8.8–10.4)
CALCIUM SERPL-MCNC: 9.2 MG/DL (ref 8.8–10.4)
CALCIUM SERPL-MCNC: 9.3 MG/DL (ref 8.2–9.6)
CHLORIDE SERPL-SCNC: 100 MMOL/L (ref 98–107)
CHLORIDE SERPL-SCNC: 100 MMOL/L (ref 98–107)
CHLORIDE SERPL-SCNC: 101 MMOL/L (ref 98–107)
CHLORIDE SERPL-SCNC: 103 MMOL/L (ref 98–107)
CHLORIDE SERPL-SCNC: 98 MMOL/L (ref 98–107)
CHLORIDE SERPL-SCNC: 99 MMOL/L (ref 98–107)
CHOLEST SERPL-MCNC: 207 MG/DL
COLOR UR AUTO: ABNORMAL
CREAT SERPL-MCNC: 0.72 MG/DL (ref 0.51–0.95)
CREAT SERPL-MCNC: 0.85 MG/DL (ref 0.51–0.95)
CREAT SERPL-MCNC: 0.86 MG/DL (ref 0.51–0.95)
CREAT SERPL-MCNC: 0.86 MG/DL (ref 0.51–0.95)
CREAT SERPL-MCNC: 0.87 MG/DL (ref 0.51–0.95)
CREAT SERPL-MCNC: 0.9 MG/DL (ref 0.51–0.95)
CREAT SERPL-MCNC: 0.93 MG/DL (ref 0.51–0.95)
CREAT SERPL-MCNC: 0.94 MG/DL (ref 0.51–0.95)
CREAT SERPL-MCNC: 0.97 MG/DL (ref 0.51–0.95)
CREAT SERPL-MCNC: 1.27 MG/DL (ref 0.51–0.95)
DEPRECATED HCO3 PLAS-SCNC: 28 MMOL/L (ref 22–29)
DIASTOLIC BLOOD PRESSURE - MUSE: 100 MMHG
DIASTOLIC BLOOD PRESSURE - MUSE: 67 MMHG
DIASTOLIC BLOOD PRESSURE - MUSE: 73 MMHG
DIASTOLIC BLOOD PRESSURE - MUSE: NORMAL MMHG
DIGOXIN SERPL-MCNC: 1.2 NG/ML (ref 0.6–2)
DIGOXIN SERPL-MCNC: 1.7 NG/ML (ref 0.6–2)
DIGOXIN SERPL-MCNC: 1.7 NG/ML (ref 0.6–2)
DIGOXIN SERPL-MCNC: 1.8 NG/ML (ref 0.6–2)
DIGOXIN SERPL-MCNC: 1.8 NG/ML (ref 0.6–2)
DIGOXIN SERPL-MCNC: 2 NG/ML (ref 0.6–2)
DIGOXIN SERPL-MCNC: 2.2 NG/ML (ref 0.6–2)
DIGOXIN SERPL-MCNC: 2.3 NG/ML (ref 0.6–2)
DIGOXIN SERPL-MCNC: 3.1 NG/ML (ref 0.6–2)
EGFRCR SERPLBLD CKD-EPI 2021: 40 ML/MIN/1.73M2
EGFRCR SERPLBLD CKD-EPI 2021: 55 ML/MIN/1.73M2
EGFRCR SERPLBLD CKD-EPI 2021: 57 ML/MIN/1.73M2
EGFRCR SERPLBLD CKD-EPI 2021: 58 ML/MIN/1.73M2
EGFRCR SERPLBLD CKD-EPI 2021: 60 ML/MIN/1.73M2
EGFRCR SERPLBLD CKD-EPI 2021: 63 ML/MIN/1.73M2
EGFRCR SERPLBLD CKD-EPI 2021: 64 ML/MIN/1.73M2
EGFRCR SERPLBLD CKD-EPI 2021: 78 ML/MIN/1.73M2
EOSINOPHIL # BLD AUTO: 0 10E3/UL (ref 0–0.7)
EOSINOPHIL # BLD AUTO: 0.1 10E3/UL (ref 0–0.7)
EOSINOPHIL # BLD AUTO: 0.1 10E3/UL (ref 0–0.7)
EOSINOPHIL NFR BLD AUTO: 0 %
EOSINOPHIL NFR BLD AUTO: 1 %
EOSINOPHIL NFR BLD AUTO: 2 %
ERYTHROCYTE [DISTWIDTH] IN BLOOD BY AUTOMATED COUNT: 13 % (ref 10–15)
ERYTHROCYTE [DISTWIDTH] IN BLOOD BY AUTOMATED COUNT: 13.1 % (ref 10–15)
ERYTHROCYTE [DISTWIDTH] IN BLOOD BY AUTOMATED COUNT: 13.2 % (ref 10–15)
ERYTHROCYTE [DISTWIDTH] IN BLOOD BY AUTOMATED COUNT: 13.3 % (ref 10–15)
ERYTHROCYTE [DISTWIDTH] IN BLOOD BY AUTOMATED COUNT: 13.5 % (ref 10–15)
FLUAV RNA SPEC QL NAA+PROBE: NEGATIVE
FLUAV RNA SPEC QL NAA+PROBE: NEGATIVE
FLUBV RNA RESP QL NAA+PROBE: NEGATIVE
FLUBV RNA RESP QL NAA+PROBE: NEGATIVE
GLUCOSE BLDC GLUCOMTR-MCNC: 111 MG/DL (ref 70–99)
GLUCOSE BLDC GLUCOMTR-MCNC: 119 MG/DL (ref 70–99)
GLUCOSE BLDC GLUCOMTR-MCNC: 121 MG/DL (ref 70–99)
GLUCOSE BLDC GLUCOMTR-MCNC: 124 MG/DL (ref 70–99)
GLUCOSE BLDC GLUCOMTR-MCNC: 125 MG/DL (ref 70–99)
GLUCOSE BLDC GLUCOMTR-MCNC: 126 MG/DL (ref 70–99)
GLUCOSE BLDC GLUCOMTR-MCNC: 136 MG/DL (ref 70–99)
GLUCOSE BLDC GLUCOMTR-MCNC: 141 MG/DL (ref 70–99)
GLUCOSE BLDC GLUCOMTR-MCNC: 142 MG/DL (ref 70–99)
GLUCOSE BLDC GLUCOMTR-MCNC: 145 MG/DL (ref 70–99)
GLUCOSE BLDC GLUCOMTR-MCNC: 146 MG/DL (ref 70–99)
GLUCOSE BLDC GLUCOMTR-MCNC: 147 MG/DL (ref 70–99)
GLUCOSE BLDC GLUCOMTR-MCNC: 163 MG/DL (ref 70–99)
GLUCOSE BLDC GLUCOMTR-MCNC: 176 MG/DL (ref 70–99)
GLUCOSE BLDC GLUCOMTR-MCNC: 182 MG/DL (ref 70–99)
GLUCOSE BLDC GLUCOMTR-MCNC: 189 MG/DL (ref 70–99)
GLUCOSE BLDC GLUCOMTR-MCNC: 202 MG/DL (ref 70–99)
GLUCOSE BLDC GLUCOMTR-MCNC: 217 MG/DL (ref 70–99)
GLUCOSE BLDC GLUCOMTR-MCNC: 219 MG/DL (ref 70–99)
GLUCOSE BLDC GLUCOMTR-MCNC: 74 MG/DL (ref 70–99)
GLUCOSE BLDC GLUCOMTR-MCNC: 84 MG/DL (ref 70–99)
GLUCOSE SERPL-MCNC: 124 MG/DL (ref 70–99)
GLUCOSE SERPL-MCNC: 130 MG/DL (ref 70–99)
GLUCOSE SERPL-MCNC: 132 MG/DL (ref 70–99)
GLUCOSE SERPL-MCNC: 137 MG/DL (ref 70–99)
GLUCOSE SERPL-MCNC: 153 MG/DL (ref 70–99)
GLUCOSE SERPL-MCNC: 153 MG/DL (ref 70–99)
GLUCOSE SERPL-MCNC: 165 MG/DL (ref 70–99)
GLUCOSE SERPL-MCNC: 178 MG/DL (ref 70–99)
GLUCOSE SERPL-MCNC: 69 MG/DL (ref 70–99)
GLUCOSE SERPL-MCNC: 74 MG/DL (ref 70–99)
GLUCOSE UR STRIP-MCNC: NEGATIVE MG/DL
HCO3 SERPL-SCNC: 24 MMOL/L (ref 22–29)
HCO3 SERPL-SCNC: 26 MMOL/L (ref 22–29)
HCO3 SERPL-SCNC: 27 MMOL/L (ref 22–29)
HCO3 SERPL-SCNC: 28 MMOL/L (ref 22–29)
HCO3 SERPL-SCNC: 29 MMOL/L (ref 22–29)
HCO3 SERPL-SCNC: 30 MMOL/L (ref 22–29)
HCO3 SERPL-SCNC: 31 MMOL/L (ref 22–29)
HCT VFR BLD AUTO: 37.1 % (ref 35–47)
HCT VFR BLD AUTO: 40 % (ref 35–47)
HCT VFR BLD AUTO: 41 % (ref 35–47)
HCT VFR BLD AUTO: 42 % (ref 35–47)
HCT VFR BLD AUTO: 43.3 % (ref 35–47)
HCT VFR BLD AUTO: 43.7 % (ref 35–47)
HCT VFR BLD AUTO: 45.4 % (ref 35–47)
HDLC SERPL-MCNC: 34 MG/DL
HGB BLD-MCNC: 12.5 G/DL (ref 11.7–15.7)
HGB BLD-MCNC: 13.6 G/DL (ref 11.7–15.7)
HGB BLD-MCNC: 14.1 G/DL (ref 11.7–15.7)
HGB BLD-MCNC: 14.3 G/DL (ref 11.7–15.7)
HGB BLD-MCNC: 14.4 G/DL (ref 11.7–15.7)
HGB BLD-MCNC: 14.5 G/DL (ref 11.7–15.7)
HGB BLD-MCNC: 14.7 G/DL (ref 11.7–15.7)
HGB BLD-MCNC: 14.7 G/DL (ref 11.7–15.7)
HGB BLD-MCNC: 15.1 G/DL (ref 11.7–15.7)
HGB UR QL STRIP: NEGATIVE
HOLD SPECIMEN: NORMAL
IMM GRANULOCYTES # BLD: 0 10E3/UL
IMM GRANULOCYTES # BLD: 0 10E3/UL
IMM GRANULOCYTES # BLD: 0.1 10E3/UL
IMM GRANULOCYTES NFR BLD: 0 %
IMM GRANULOCYTES NFR BLD: 1 %
IMM GRANULOCYTES NFR BLD: 1 %
INR PPP: 1.8 (ref 0.85–1.15)
INR PPP: 2.02 (ref 0.85–1.15)
INR PPP: 2.16 (ref 0.85–1.15)
INR PPP: 2.25 (ref 0.85–1.15)
INR PPP: 2.32 (ref 0.85–1.15)
INR PPP: 2.42 (ref 0.85–1.15)
INR PPP: 2.47 (ref 0.85–1.15)
INR PPP: 2.69 (ref 0.85–1.15)
INR PPP: 2.74 (ref 0.85–1.15)
INR PPP: 2.83 (ref 0.85–1.15)
INR PPP: 2.94 (ref 0.85–1.15)
INR PPP: 2.97 (ref 0.85–1.15)
INR PPP: 3.07 (ref 0.85–1.15)
INR PPP: 3.12 (ref 0.85–1.15)
INR PPP: 3.38 (ref 0.85–1.15)
INR PPP: 3.4 (ref 0.85–1.15)
INTERPRETATION ECG - MUSE: NORMAL
KETONES UR STRIP-MCNC: NEGATIVE MG/DL
LDLC SERPL CALC-MCNC: 134 MG/DL
LEUKOCYTE ESTERASE UR QL STRIP: ABNORMAL
LIPASE SERPL-CCNC: 24 U/L (ref 13–60)
LVEF ECHO: NORMAL
LYMPHOCYTES # BLD AUTO: 0.9 10E3/UL (ref 0.8–5.3)
LYMPHOCYTES # BLD AUTO: 1.5 10E3/UL (ref 0.8–5.3)
LYMPHOCYTES # BLD AUTO: 1.9 10E3/UL (ref 0.8–5.3)
LYMPHOCYTES NFR BLD AUTO: 19 %
LYMPHOCYTES NFR BLD AUTO: 19 %
LYMPHOCYTES NFR BLD AUTO: 9 %
MAGNESIUM SERPL-MCNC: 1.7 MG/DL (ref 1.7–2.3)
MAGNESIUM SERPL-MCNC: 1.9 MG/DL (ref 1.7–2.3)
MAGNESIUM SERPL-MCNC: 1.9 MG/DL (ref 1.7–2.3)
MCH RBC QN AUTO: 29.8 PG (ref 26.5–33)
MCH RBC QN AUTO: 29.9 PG (ref 26.5–33)
MCH RBC QN AUTO: 30 PG (ref 26.5–33)
MCH RBC QN AUTO: 30 PG (ref 26.5–33)
MCH RBC QN AUTO: 30.1 PG (ref 26.5–33)
MCH RBC QN AUTO: 30.2 PG (ref 26.5–33)
MCH RBC QN AUTO: 30.2 PG (ref 26.5–33)
MCH RBC QN AUTO: 30.5 PG (ref 26.5–33)
MCH RBC QN AUTO: 30.8 PG (ref 26.5–33)
MCHC RBC AUTO-ENTMCNC: 33.2 G/DL (ref 31.5–36.5)
MCHC RBC AUTO-ENTMCNC: 33.3 G/DL (ref 31.5–36.5)
MCHC RBC AUTO-ENTMCNC: 33.3 G/DL (ref 31.5–36.5)
MCHC RBC AUTO-ENTMCNC: 33.6 G/DL (ref 31.5–36.5)
MCHC RBC AUTO-ENTMCNC: 33.6 G/DL (ref 31.5–36.5)
MCHC RBC AUTO-ENTMCNC: 33.7 G/DL (ref 31.5–36.5)
MCHC RBC AUTO-ENTMCNC: 34 G/DL (ref 31.5–36.5)
MCHC RBC AUTO-ENTMCNC: 34 G/DL (ref 31.5–36.5)
MCHC RBC AUTO-ENTMCNC: 34.4 G/DL (ref 31.5–36.5)
MCV RBC AUTO: 88 FL (ref 78–100)
MCV RBC AUTO: 88 FL (ref 78–100)
MCV RBC AUTO: 89 FL (ref 78–100)
MCV RBC AUTO: 90 FL (ref 78–100)
MCV RBC AUTO: 91 FL (ref 78–100)
MCV RBC AUTO: 91 FL (ref 78–100)
MONOCYTES # BLD AUTO: 0.4 10E3/UL (ref 0–1.3)
MONOCYTES # BLD AUTO: 0.6 10E3/UL (ref 0–1.3)
MONOCYTES # BLD AUTO: 0.8 10E3/UL (ref 0–1.3)
MONOCYTES NFR BLD AUTO: 4 %
MONOCYTES NFR BLD AUTO: 8 %
MONOCYTES NFR BLD AUTO: 8 %
NEUTROPHILS # BLD AUTO: 5.7 10E3/UL (ref 1.6–8.3)
NEUTROPHILS # BLD AUTO: 6.9 10E3/UL (ref 1.6–8.3)
NEUTROPHILS # BLD AUTO: 8.7 10E3/UL (ref 1.6–8.3)
NEUTROPHILS NFR BLD AUTO: 70 %
NEUTROPHILS NFR BLD AUTO: 71 %
NEUTROPHILS NFR BLD AUTO: 86 %
NITRATE UR QL: NEGATIVE
NONHDLC SERPL-MCNC: 173 MG/DL
NRBC # BLD AUTO: 0 10E3/UL
NRBC BLD AUTO-RTO: 0 /100
P AXIS - MUSE: NORMAL DEGREES
PH UR STRIP: 7.5 [PH] (ref 5–7)
PLATELET # BLD AUTO: 167 10E3/UL (ref 150–450)
PLATELET # BLD AUTO: 181 10E3/UL (ref 150–450)
PLATELET # BLD AUTO: 200 10E3/UL (ref 150–450)
PLATELET # BLD AUTO: 200 10E3/UL (ref 150–450)
PLATELET # BLD AUTO: 212 10E3/UL (ref 150–450)
PLATELET # BLD AUTO: 212 10E3/UL (ref 150–450)
PLATELET # BLD AUTO: 215 10E3/UL (ref 150–450)
PLATELET # BLD AUTO: 225 10E3/UL (ref 150–450)
PLATELET # BLD AUTO: 243 10E3/UL (ref 150–450)
POTASSIUM SERPL-SCNC: 3 MMOL/L (ref 3.4–5.3)
POTASSIUM SERPL-SCNC: 3.1 MMOL/L (ref 3.4–5.3)
POTASSIUM SERPL-SCNC: 3.2 MMOL/L (ref 3.4–5.3)
POTASSIUM SERPL-SCNC: 3.4 MMOL/L (ref 3.4–5.3)
POTASSIUM SERPL-SCNC: 3.4 MMOL/L (ref 3.4–5.3)
POTASSIUM SERPL-SCNC: 3.6 MMOL/L (ref 3.4–5.3)
POTASSIUM SERPL-SCNC: 3.7 MMOL/L (ref 3.4–5.3)
POTASSIUM SERPL-SCNC: 3.8 MMOL/L (ref 3.4–5.3)
POTASSIUM SERPL-SCNC: 3.8 MMOL/L (ref 3.4–5.3)
POTASSIUM SERPL-SCNC: 3.9 MMOL/L (ref 3.4–5.3)
POTASSIUM SERPL-SCNC: 4.2 MMOL/L (ref 3.4–5.3)
PR INTERVAL - MUSE: NORMAL MS
PROT SERPL-MCNC: 6.7 G/DL (ref 6.4–8.3)
QRS DURATION - MUSE: 106 MS
QRS DURATION - MUSE: 110 MS
QRS DURATION - MUSE: 116 MS
QRS DURATION - MUSE: 118 MS
QT - MUSE: 322 MS
QT - MUSE: 344 MS
QT - MUSE: 376 MS
QT - MUSE: 394 MS
QTC - MUSE: 329 MS
QTC - MUSE: 329 MS
QTC - MUSE: 336 MS
QTC - MUSE: 433 MS
R AXIS - MUSE: -17 DEGREES
R AXIS - MUSE: -4 DEGREES
R AXIS - MUSE: -7 DEGREES
R AXIS - MUSE: 1 DEGREES
RBC # BLD AUTO: 4.1 10E6/UL (ref 3.8–5.2)
RBC # BLD AUTO: 4.54 10E6/UL (ref 3.8–5.2)
RBC # BLD AUTO: 4.64 10E6/UL (ref 3.8–5.2)
RBC # BLD AUTO: 4.67 10E6/UL (ref 3.8–5.2)
RBC # BLD AUTO: 4.8 10E6/UL (ref 3.8–5.2)
RBC # BLD AUTO: 4.86 10E6/UL (ref 3.8–5.2)
RBC # BLD AUTO: 4.87 10E6/UL (ref 3.8–5.2)
RBC # BLD AUTO: 4.92 10E6/UL (ref 3.8–5.2)
RBC # BLD AUTO: 5.02 10E6/UL (ref 3.8–5.2)
RBC URINE: <1 /HPF
RSV RNA SPEC NAA+PROBE: NEGATIVE
RSV RNA SPEC NAA+PROBE: NEGATIVE
SARS-COV-2 RNA RESP QL NAA+PROBE: NEGATIVE
SARS-COV-2 RNA RESP QL NAA+PROBE: NEGATIVE
SODIUM SERPL-SCNC: 137 MMOL/L (ref 135–145)
SODIUM SERPL-SCNC: 138 MMOL/L (ref 135–145)
SODIUM SERPL-SCNC: 139 MMOL/L (ref 135–145)
SODIUM SERPL-SCNC: 140 MMOL/L (ref 135–145)
SODIUM SERPL-SCNC: 140 MMOL/L (ref 135–145)
SODIUM SERPL-SCNC: 141 MMOL/L (ref 135–145)
SODIUM SERPL-SCNC: 142 MMOL/L (ref 135–145)
SP GR UR STRIP: 1.01 (ref 1–1.03)
SYSTOLIC BLOOD PRESSURE - MUSE: 125 MMHG
SYSTOLIC BLOOD PRESSURE - MUSE: 167 MMHG
SYSTOLIC BLOOD PRESSURE - MUSE: 168 MMHG
SYSTOLIC BLOOD PRESSURE - MUSE: NORMAL MMHG
T AXIS - MUSE: 112 DEGREES
T AXIS - MUSE: 161 DEGREES
T AXIS - MUSE: 202 DEGREES
T AXIS - MUSE: 52 DEGREES
T4 FREE SERPL-MCNC: 1.23 NG/DL (ref 0.9–1.7)
T4 FREE SERPL-MCNC: 1.3 NG/DL (ref 0.9–1.7)
TRIGL SERPL-MCNC: 193 MG/DL
TROPONIN T SERPL HS-MCNC: 29 NG/L
TROPONIN T SERPL HS-MCNC: 30 NG/L
TSH SERPL DL<=0.005 MIU/L-ACNC: 4.07 UIU/ML (ref 0.3–4.2)
TSH SERPL DL<=0.005 MIU/L-ACNC: 4.7 UIU/ML (ref 0.3–4.2)
UROBILINOGEN UR STRIP-MCNC: <2 MG/DL
VENTRICULAR RATE- MUSE: 44 BPM
VENTRICULAR RATE- MUSE: 55 BPM
VENTRICULAR RATE- MUSE: 63 BPM
VENTRICULAR RATE- MUSE: 80 BPM
WBC # BLD AUTO: 10 10E3/UL (ref 4–11)
WBC # BLD AUTO: 10.1 10E3/UL (ref 4–11)
WBC # BLD AUTO: 10.2 10E3/UL (ref 4–11)
WBC # BLD AUTO: 11.3 10E3/UL (ref 4–11)
WBC # BLD AUTO: 6 10E3/UL (ref 4–11)
WBC # BLD AUTO: 6.9 10E3/UL (ref 4–11)
WBC # BLD AUTO: 8 10E3/UL (ref 4–11)
WBC # BLD AUTO: 8.2 10E3/UL (ref 4–11)
WBC # BLD AUTO: 9.9 10E3/UL (ref 4–11)
WBC URINE: 3 /HPF

## 2024-01-01 PROCEDURE — 36415 COLL VENOUS BLD VENIPUNCTURE: CPT

## 2024-01-01 PROCEDURE — 120N000001 HC R&B MED SURG/OB

## 2024-01-01 PROCEDURE — 97116 GAIT TRAINING THERAPY: CPT | Mod: GP

## 2024-01-01 PROCEDURE — 99222 1ST HOSP IP/OBS MODERATE 55: CPT | Mod: AI

## 2024-01-01 PROCEDURE — P9604 ONE-WAY ALLOW PRORATED TRIP: HCPCS | Mod: ORL

## 2024-01-01 PROCEDURE — 85027 COMPLETE CBC AUTOMATED: CPT

## 2024-01-01 PROCEDURE — 80162 ASSAY OF DIGOXIN TOTAL: CPT | Performed by: EMERGENCY MEDICINE

## 2024-01-01 PROCEDURE — 258N000003 HC RX IP 258 OP 636: Performed by: EMERGENCY MEDICINE

## 2024-01-01 PROCEDURE — 250N000011 HC RX IP 250 OP 636: Mod: JW

## 2024-01-01 PROCEDURE — 250N000013 HC RX MED GY IP 250 OP 250 PS 637

## 2024-01-01 PROCEDURE — 84132 ASSAY OF SERUM POTASSIUM: CPT

## 2024-01-01 PROCEDURE — 70450 CT HEAD/BRAIN W/O DYE: CPT

## 2024-01-01 PROCEDURE — 83735 ASSAY OF MAGNESIUM: CPT

## 2024-01-01 PROCEDURE — 85610 PROTHROMBIN TIME: CPT

## 2024-01-01 PROCEDURE — 85610 PROTHROMBIN TIME: CPT | Mod: ORL

## 2024-01-01 PROCEDURE — 85610 PROTHROMBIN TIME: CPT | Mod: ORL | Performed by: FAMILY MEDICINE

## 2024-01-01 PROCEDURE — 93306 TTE W/DOPPLER COMPLETE: CPT

## 2024-01-01 PROCEDURE — 97162 PT EVAL MOD COMPLEX 30 MIN: CPT | Mod: GP | Performed by: PHYSICAL THERAPIST

## 2024-01-01 PROCEDURE — 85014 HEMATOCRIT: CPT

## 2024-01-01 PROCEDURE — 82962 GLUCOSE BLOOD TEST: CPT

## 2024-01-01 PROCEDURE — 92526 ORAL FUNCTION THERAPY: CPT | Mod: GN

## 2024-01-01 PROCEDURE — 96361 HYDRATE IV INFUSION ADD-ON: CPT

## 2024-01-01 PROCEDURE — 81001 URINALYSIS AUTO W/SCOPE: CPT | Performed by: STUDENT IN AN ORGANIZED HEALTH CARE EDUCATION/TRAINING PROGRAM

## 2024-01-01 PROCEDURE — 80048 BASIC METABOLIC PNL TOTAL CA: CPT

## 2024-01-01 PROCEDURE — 97535 SELF CARE MNGMENT TRAINING: CPT | Mod: GO

## 2024-01-01 PROCEDURE — 85610 PROTHROMBIN TIME: CPT | Performed by: EMERGENCY MEDICINE

## 2024-01-01 PROCEDURE — 92610 EVALUATE SWALLOWING FUNCTION: CPT | Mod: GN

## 2024-01-01 PROCEDURE — 36415 COLL VENOUS BLD VENIPUNCTURE: CPT | Performed by: STUDENT IN AN ORGANIZED HEALTH CARE EDUCATION/TRAINING PROGRAM

## 2024-01-01 PROCEDURE — 250N000012 HC RX MED GY IP 250 OP 636 PS 637

## 2024-01-01 PROCEDURE — 80162 ASSAY OF DIGOXIN TOTAL: CPT

## 2024-01-01 PROCEDURE — 84439 ASSAY OF FREE THYROXINE: CPT

## 2024-01-01 PROCEDURE — 36415 COLL VENOUS BLD VENIPUNCTURE: CPT | Mod: ORL | Performed by: FAMILY MEDICINE

## 2024-01-01 PROCEDURE — 36415 COLL VENOUS BLD VENIPUNCTURE: CPT | Performed by: EMERGENCY MEDICINE

## 2024-01-01 PROCEDURE — P9604 ONE-WAY ALLOW PRORATED TRIP: HCPCS | Mod: ORL | Performed by: FAMILY MEDICINE

## 2024-01-01 PROCEDURE — 99222 1ST HOSP IP/OBS MODERATE 55: CPT | Performed by: PSYCHIATRY & NEUROLOGY

## 2024-01-01 PROCEDURE — 80053 COMPREHEN METABOLIC PANEL: CPT | Performed by: EMERGENCY MEDICINE

## 2024-01-01 PROCEDURE — 85025 COMPLETE CBC W/AUTO DIFF WBC: CPT

## 2024-01-01 PROCEDURE — G0378 HOSPITAL OBSERVATION PER HR: HCPCS

## 2024-01-01 PROCEDURE — 250N000011 HC RX IP 250 OP 636: Performed by: STUDENT IN AN ORGANIZED HEALTH CARE EDUCATION/TRAINING PROGRAM

## 2024-01-01 PROCEDURE — 258N000003 HC RX IP 258 OP 636: Performed by: STUDENT IN AN ORGANIZED HEALTH CARE EDUCATION/TRAINING PROGRAM

## 2024-01-01 PROCEDURE — 99232 SBSQ HOSP IP/OBS MODERATE 35: CPT | Mod: GC

## 2024-01-01 PROCEDURE — 255N000002 HC RX 255 OP 636: Performed by: STUDENT IN AN ORGANIZED HEALTH CARE EDUCATION/TRAINING PROGRAM

## 2024-01-01 PROCEDURE — 72125 CT NECK SPINE W/O DYE: CPT

## 2024-01-01 PROCEDURE — P9603 ONE-WAY ALLOW PRORATED MILES: HCPCS | Mod: ORL | Performed by: FAMILY MEDICINE

## 2024-01-01 PROCEDURE — 250N000011 HC RX IP 250 OP 636: Mod: JZ

## 2024-01-01 PROCEDURE — 250N000013 HC RX MED GY IP 250 OP 250 PS 637: Performed by: EMERGENCY MEDICINE

## 2024-01-01 PROCEDURE — 84443 ASSAY THYROID STIM HORMONE: CPT | Mod: ORL | Performed by: FAMILY MEDICINE

## 2024-01-01 PROCEDURE — 258N000003 HC RX IP 258 OP 636

## 2024-01-01 PROCEDURE — 93005 ELECTROCARDIOGRAM TRACING: CPT

## 2024-01-01 PROCEDURE — 85048 AUTOMATED LEUKOCYTE COUNT: CPT

## 2024-01-01 PROCEDURE — 84443 ASSAY THYROID STIM HORMONE: CPT

## 2024-01-01 PROCEDURE — 96374 THER/PROPH/DIAG INJ IV PUSH: CPT

## 2024-01-01 PROCEDURE — 99207 PR NO CHARGE LOS: CPT | Performed by: PHYSICIAN ASSISTANT

## 2024-01-01 PROCEDURE — 97166 OT EVAL MOD COMPLEX 45 MIN: CPT | Mod: GO

## 2024-01-01 PROCEDURE — 93005 ELECTROCARDIOGRAM TRACING: CPT | Performed by: EMERGENCY MEDICINE

## 2024-01-01 PROCEDURE — 36415 COLL VENOUS BLD VENIPUNCTURE: CPT | Mod: ORL

## 2024-01-01 PROCEDURE — 70496 CT ANGIOGRAPHY HEAD: CPT

## 2024-01-01 PROCEDURE — 99223 1ST HOSP IP/OBS HIGH 75: CPT | Mod: AI

## 2024-01-01 PROCEDURE — 97530 THERAPEUTIC ACTIVITIES: CPT | Mod: GP

## 2024-01-01 PROCEDURE — 99285 EMERGENCY DEPT VISIT HI MDM: CPT | Mod: 25

## 2024-01-01 PROCEDURE — 99238 HOSP IP/OBS DSCHRG MGMT 30/<: CPT | Mod: GC

## 2024-01-01 PROCEDURE — 80048 BASIC METABOLIC PNL TOTAL CA: CPT | Performed by: EMERGENCY MEDICINE

## 2024-01-01 PROCEDURE — 80048 BASIC METABOLIC PNL TOTAL CA: CPT | Mod: ORL | Performed by: FAMILY MEDICINE

## 2024-01-01 PROCEDURE — 93306 TTE W/DOPPLER COMPLETE: CPT | Mod: 26 | Performed by: INTERNAL MEDICINE

## 2024-01-01 PROCEDURE — 80162 ASSAY OF DIGOXIN TOTAL: CPT | Performed by: FAMILY MEDICINE

## 2024-01-01 PROCEDURE — 84484 ASSAY OF TROPONIN QUANT: CPT

## 2024-01-01 PROCEDURE — 85610 PROTHROMBIN TIME: CPT | Performed by: STUDENT IN AN ORGANIZED HEALTH CARE EDUCATION/TRAINING PROGRAM

## 2024-01-01 PROCEDURE — 84439 ASSAY OF FREE THYROXINE: CPT | Mod: ORL | Performed by: FAMILY MEDICINE

## 2024-01-01 PROCEDURE — 71045 X-RAY EXAM CHEST 1 VIEW: CPT

## 2024-01-01 PROCEDURE — 250N000013 HC RX MED GY IP 250 OP 250 PS 637: Performed by: FAMILY MEDICINE

## 2024-01-01 PROCEDURE — 70553 MRI BRAIN STEM W/O & W/DYE: CPT

## 2024-01-01 PROCEDURE — 99232 SBSQ HOSP IP/OBS MODERATE 35: CPT | Performed by: PSYCHIATRY & NEUROLOGY

## 2024-01-01 PROCEDURE — 250N000011 HC RX IP 250 OP 636

## 2024-01-01 PROCEDURE — A9585 GADOBUTROL INJECTION: HCPCS | Performed by: STUDENT IN AN ORGANIZED HEALTH CARE EDUCATION/TRAINING PROGRAM

## 2024-01-01 PROCEDURE — 93010 ELECTROCARDIOGRAM REPORT: CPT | Performed by: INTERNAL MEDICINE

## 2024-01-01 PROCEDURE — 85048 AUTOMATED LEUKOCYTE COUNT: CPT | Performed by: EMERGENCY MEDICINE

## 2024-01-01 PROCEDURE — 87637 SARSCOV2&INF A&B&RSV AMP PRB: CPT | Performed by: EMERGENCY MEDICINE

## 2024-01-01 PROCEDURE — 87086 URINE CULTURE/COLONY COUNT: CPT

## 2024-01-01 PROCEDURE — 97162 PT EVAL MOD COMPLEX 30 MIN: CPT | Mod: GP

## 2024-01-01 PROCEDURE — 82465 ASSAY BLD/SERUM CHOLESTEROL: CPT

## 2024-01-01 PROCEDURE — 83690 ASSAY OF LIPASE: CPT | Performed by: EMERGENCY MEDICINE

## 2024-01-01 PROCEDURE — 83735 ASSAY OF MAGNESIUM: CPT | Performed by: EMERGENCY MEDICINE

## 2024-01-01 PROCEDURE — 71046 X-RAY EXAM CHEST 2 VIEWS: CPT

## 2024-01-01 PROCEDURE — 96360 HYDRATION IV INFUSION INIT: CPT

## 2024-01-01 PROCEDURE — 97530 THERAPEUTIC ACTIVITIES: CPT | Mod: GP | Performed by: PHYSICAL THERAPIST

## 2024-01-01 PROCEDURE — 85025 COMPLETE CBC W/AUTO DIFF WBC: CPT | Performed by: EMERGENCY MEDICINE

## 2024-01-01 PROCEDURE — 87637 SARSCOV2&INF A&B&RSV AMP PRB: CPT

## 2024-01-01 RX ORDER — AMOXICILLIN 250 MG
1 CAPSULE ORAL 2 TIMES DAILY PRN
Status: DISCONTINUED | OUTPATIENT
Start: 2024-01-01 | End: 2024-01-01 | Stop reason: HOSPADM

## 2024-01-01 RX ORDER — ACETAMINOPHEN 325 MG/1
650 TABLET ORAL EVERY 4 HOURS PRN
Status: DISCONTINUED | OUTPATIENT
Start: 2024-01-01 | End: 2024-01-01 | Stop reason: HOSPADM

## 2024-01-01 RX ORDER — LISINOPRIL 20 MG/1
40 TABLET ORAL DAILY
Status: DISCONTINUED | OUTPATIENT
Start: 2024-01-01 | End: 2024-01-01 | Stop reason: HOSPADM

## 2024-01-01 RX ORDER — ONDANSETRON 4 MG/1
4 TABLET, ORALLY DISINTEGRATING ORAL EVERY 6 HOURS PRN
Status: DISCONTINUED | OUTPATIENT
Start: 2024-01-01 | End: 2024-01-01 | Stop reason: HOSPADM

## 2024-01-01 RX ORDER — ATORVASTATIN CALCIUM 40 MG/1
40 TABLET, FILM COATED ORAL DAILY
Status: DISCONTINUED | OUTPATIENT
Start: 2024-01-01 | End: 2024-01-01 | Stop reason: HOSPADM

## 2024-01-01 RX ORDER — CHLORTHALIDONE 25 MG/1
12.5 TABLET ORAL DAILY
COMMUNITY
Start: 2024-01-01

## 2024-01-01 RX ORDER — ATORVASTATIN CALCIUM 40 MG/1
40 TABLET, FILM COATED ORAL DAILY
Qty: 30 TABLET | Refills: 0 | Status: SHIPPED | OUTPATIENT
Start: 2024-01-01 | End: 2024-01-01

## 2024-01-01 RX ORDER — POTASSIUM CHLORIDE 1500 MG/1
40 TABLET, EXTENDED RELEASE ORAL ONCE
Status: COMPLETED | OUTPATIENT
Start: 2024-01-01 | End: 2024-01-01

## 2024-01-01 RX ORDER — LEVOTHYROXINE SODIUM 25 UG/1
25 TABLET ORAL
Status: DISCONTINUED | OUTPATIENT
Start: 2024-01-01 | End: 2024-01-01 | Stop reason: HOSPADM

## 2024-01-01 RX ORDER — CALCIUM CARBONATE 500 MG/1
1000 TABLET, CHEWABLE ORAL 4 TIMES DAILY PRN
Status: DISCONTINUED | OUTPATIENT
Start: 2024-01-01 | End: 2024-01-01 | Stop reason: HOSPADM

## 2024-01-01 RX ORDER — WARFARIN SODIUM 5 MG/1
5 TABLET ORAL
Status: COMPLETED | OUTPATIENT
Start: 2024-01-01 | End: 2024-01-01

## 2024-01-01 RX ORDER — WARFARIN SODIUM 5 MG/1
TABLET ORAL SEE ADMIN INSTRUCTIONS
Status: ON HOLD | COMMUNITY
Start: 2024-01-01 | End: 2024-01-01

## 2024-01-01 RX ORDER — HYDRALAZINE HYDROCHLORIDE 25 MG/1
25 TABLET, FILM COATED ORAL 2 TIMES DAILY PRN
Status: DISCONTINUED | OUTPATIENT
Start: 2024-01-01 | End: 2024-01-01 | Stop reason: HOSPADM

## 2024-01-01 RX ORDER — ACETAMINOPHEN 650 MG/1
650 SUPPOSITORY RECTAL EVERY 4 HOURS PRN
Status: DISCONTINUED | OUTPATIENT
Start: 2024-01-01 | End: 2024-01-01 | Stop reason: HOSPADM

## 2024-01-01 RX ORDER — ACETAMINOPHEN 650 MG/20.3ML
650 LIQUID ORAL EVERY 4 HOURS PRN
Status: DISCONTINUED | OUTPATIENT
Start: 2024-01-01 | End: 2024-01-01 | Stop reason: HOSPADM

## 2024-01-01 RX ORDER — LISINOPRIL 20 MG/1
40 TABLET ORAL DAILY
Status: DISCONTINUED | OUTPATIENT
Start: 2024-01-01 | End: 2024-01-01

## 2024-01-01 RX ORDER — DIGOXIN 125 MCG
250 TABLET ORAL EVERY MORNING
Status: DISCONTINUED | OUTPATIENT
Start: 2024-01-01 | End: 2024-01-01 | Stop reason: HOSPADM

## 2024-01-01 RX ORDER — LISINOPRIL 40 MG/1
40 TABLET ORAL EVERY MORNING
COMMUNITY
Start: 2024-01-01

## 2024-01-01 RX ORDER — ONDANSETRON 2 MG/ML
4 INJECTION INTRAMUSCULAR; INTRAVENOUS EVERY 6 HOURS PRN
Status: DISCONTINUED | OUTPATIENT
Start: 2024-01-01 | End: 2024-01-01 | Stop reason: HOSPADM

## 2024-01-01 RX ORDER — LABETALOL HYDROCHLORIDE 5 MG/ML
10 INJECTION, SOLUTION INTRAVENOUS ONCE
Status: COMPLETED | OUTPATIENT
Start: 2024-01-01 | End: 2024-01-01

## 2024-01-01 RX ORDER — GADOBUTROL 604.72 MG/ML
6 INJECTION INTRAVENOUS ONCE
Status: COMPLETED | OUTPATIENT
Start: 2024-01-01 | End: 2024-01-01

## 2024-01-01 RX ORDER — LEVOTHYROXINE SODIUM 25 UG/1
50 TABLET ORAL
Status: DISCONTINUED | OUTPATIENT
Start: 2024-01-01 | End: 2024-01-01 | Stop reason: HOSPADM

## 2024-01-01 RX ORDER — POTASSIUM CHLORIDE 1.5 G/1.58G
40 POWDER, FOR SOLUTION ORAL ONCE
Status: COMPLETED | OUTPATIENT
Start: 2024-01-01 | End: 2024-01-01

## 2024-01-01 RX ORDER — WARFARIN SODIUM 7.5 MG/1
TABLET ORAL SEE ADMIN INSTRUCTIONS
Status: ON HOLD | COMMUNITY
Start: 2024-01-01 | End: 2024-01-01

## 2024-01-01 RX ORDER — LIDOCAINE 40 MG/G
CREAM TOPICAL
Status: DISCONTINUED | OUTPATIENT
Start: 2024-01-01 | End: 2024-01-01 | Stop reason: HOSPADM

## 2024-01-01 RX ORDER — LISINOPRIL 20 MG/1
40 TABLET ORAL EVERY MORNING
Status: DISCONTINUED | OUTPATIENT
Start: 2024-01-01 | End: 2024-01-01 | Stop reason: HOSPADM

## 2024-01-01 RX ORDER — LABETALOL HYDROCHLORIDE 5 MG/ML
10-20 INJECTION, SOLUTION INTRAVENOUS EVERY 10 MIN PRN
Status: DISCONTINUED | OUTPATIENT
Start: 2024-01-01 | End: 2024-01-01 | Stop reason: HOSPADM

## 2024-01-01 RX ORDER — LEVOTHYROXINE SODIUM 25 UG/1
50 TABLET ORAL SEE ADMIN INSTRUCTIONS
Status: DISCONTINUED | OUTPATIENT
Start: 2024-01-01 | End: 2024-01-01 | Stop reason: HOSPADM

## 2024-01-01 RX ORDER — WARFARIN SODIUM 2.5 MG/1
2.5 TABLET ORAL
Status: COMPLETED | OUTPATIENT
Start: 2024-01-01 | End: 2024-01-01

## 2024-01-01 RX ORDER — HYDRALAZINE HYDROCHLORIDE 20 MG/ML
10-20 INJECTION INTRAMUSCULAR; INTRAVENOUS EVERY 30 MIN PRN
Status: DISCONTINUED | OUTPATIENT
Start: 2024-01-01 | End: 2024-01-01 | Stop reason: HOSPADM

## 2024-01-01 RX ORDER — SODIUM CHLORIDE 9 MG/ML
INJECTION, SOLUTION INTRAVENOUS ONCE
Status: COMPLETED | OUTPATIENT
Start: 2024-01-01 | End: 2024-01-01

## 2024-01-01 RX ORDER — HYDRALAZINE HYDROCHLORIDE 20 MG/ML
10-20 INJECTION INTRAMUSCULAR; INTRAVENOUS
Status: DISCONTINUED | OUTPATIENT
Start: 2024-01-01 | End: 2024-01-01 | Stop reason: HOSPADM

## 2024-01-01 RX ORDER — IOPAMIDOL 755 MG/ML
75 INJECTION, SOLUTION INTRAVASCULAR ONCE
Status: COMPLETED | OUTPATIENT
Start: 2024-01-01 | End: 2024-01-01

## 2024-01-01 RX ORDER — DIGOXIN 125 MCG
250 TABLET ORAL DAILY
Status: DISCONTINUED | OUTPATIENT
Start: 2024-01-01 | End: 2024-01-01 | Stop reason: HOSPADM

## 2024-01-01 RX ORDER — WARFARIN SODIUM 1 MG/1
1 TABLET ORAL
Status: DISCONTINUED | OUTPATIENT
Start: 2024-01-01 | End: 2024-01-01

## 2024-01-01 RX ORDER — LEVOTHYROXINE SODIUM 50 UG/1
50 TABLET ORAL
COMMUNITY
Start: 2024-01-01

## 2024-01-01 RX ORDER — ATORVASTATIN CALCIUM 40 MG/1
40 TABLET, FILM COATED ORAL DAILY
Qty: 30 TABLET | Refills: 0 | Status: SHIPPED | OUTPATIENT
Start: 2024-01-01

## 2024-01-01 RX ORDER — SODIUM CHLORIDE, SODIUM LACTATE, POTASSIUM CHLORIDE, CALCIUM CHLORIDE 600; 310; 30; 20 MG/100ML; MG/100ML; MG/100ML; MG/100ML
INJECTION, SOLUTION INTRAVENOUS CONTINUOUS
Status: DISCONTINUED | OUTPATIENT
Start: 2024-01-01 | End: 2024-01-01 | Stop reason: HOSPADM

## 2024-01-01 RX ORDER — AMOXICILLIN 250 MG
1-2 CAPSULE ORAL DAILY PRN
Status: DISCONTINUED | OUTPATIENT
Start: 2024-01-01 | End: 2024-01-01 | Stop reason: HOSPADM

## 2024-01-01 RX ORDER — POTASSIUM CHLORIDE 1500 MG/1
20 TABLET, EXTENDED RELEASE ORAL ONCE
Status: COMPLETED | OUTPATIENT
Start: 2024-01-01 | End: 2024-01-01

## 2024-01-01 RX ORDER — DEXTROSE MONOHYDRATE 25 G/50ML
25-50 INJECTION, SOLUTION INTRAVENOUS
Status: DISCONTINUED | OUTPATIENT
Start: 2024-01-01 | End: 2024-01-01 | Stop reason: HOSPADM

## 2024-01-01 RX ORDER — NICOTINE POLACRILEX 4 MG
15-30 LOZENGE BUCCAL
Status: DISCONTINUED | OUTPATIENT
Start: 2024-01-01 | End: 2024-01-01 | Stop reason: HOSPADM

## 2024-01-01 RX ORDER — AMOXICILLIN 250 MG
2 CAPSULE ORAL 2 TIMES DAILY PRN
Status: DISCONTINUED | OUTPATIENT
Start: 2024-01-01 | End: 2024-01-01 | Stop reason: HOSPADM

## 2024-01-01 RX ORDER — SODIUM CHLORIDE 9 MG/ML
INJECTION, SOLUTION INTRAVENOUS CONTINUOUS
Status: DISCONTINUED | OUTPATIENT
Start: 2024-01-01 | End: 2024-01-01

## 2024-01-01 RX ORDER — AMMONIUM LACTATE 12 G/100G
CREAM TOPICAL DAILY
COMMUNITY
End: 2024-01-01

## 2024-01-01 RX ORDER — AMOXICILLIN 250 MG
1-2 CAPSULE ORAL 2 TIMES DAILY
Status: DISCONTINUED | OUTPATIENT
Start: 2024-01-01 | End: 2024-01-01

## 2024-01-01 RX ADMIN — CHLORTHALIDONE 12.5 MG: 25 TABLET ORAL at 08:17

## 2024-01-01 RX ADMIN — CHLORTHALIDONE 12.5 MG: 25 TABLET ORAL at 08:21

## 2024-01-01 RX ADMIN — ACETAMINOPHEN 650 MG: 325 TABLET ORAL at 10:25

## 2024-01-01 RX ADMIN — ATORVASTATIN CALCIUM 40 MG: 40 TABLET, FILM COATED ORAL at 08:02

## 2024-01-01 RX ADMIN — IOPAMIDOL 75 ML: 755 INJECTION, SOLUTION INTRAVENOUS at 16:03

## 2024-01-01 RX ADMIN — SODIUM CHLORIDE: 9 INJECTION, SOLUTION INTRAVENOUS at 18:47

## 2024-01-01 RX ADMIN — DIGOXIN 250 MCG: 125 TABLET ORAL at 09:14

## 2024-01-01 RX ADMIN — INSULIN ASPART 1 UNITS: 100 INJECTION, SOLUTION INTRAVENOUS; SUBCUTANEOUS at 08:18

## 2024-01-01 RX ADMIN — LEVOTHYROXINE SODIUM 50 MCG: 0.03 TABLET ORAL at 08:22

## 2024-01-01 RX ADMIN — INSULIN ASPART 2 UNITS: 100 INJECTION, SOLUTION INTRAVENOUS; SUBCUTANEOUS at 17:06

## 2024-01-01 RX ADMIN — LISINOPRIL 40 MG: 20 TABLET ORAL at 08:20

## 2024-01-01 RX ADMIN — INSULIN ASPART 1 UNITS: 100 INJECTION, SOLUTION INTRAVENOUS; SUBCUTANEOUS at 12:22

## 2024-01-01 RX ADMIN — LEVOTHYROXINE SODIUM 25 MCG: 0.03 TABLET ORAL at 06:40

## 2024-01-01 RX ADMIN — SODIUM CHLORIDE, POTASSIUM CHLORIDE, SODIUM LACTATE AND CALCIUM CHLORIDE: 600; 310; 30; 20 INJECTION, SOLUTION INTRAVENOUS at 23:34

## 2024-01-01 RX ADMIN — APIXABAN 2.5 MG: 2.5 TABLET, FILM COATED ORAL at 21:50

## 2024-01-01 RX ADMIN — INSULIN ASPART 1 UNITS: 100 INJECTION, SOLUTION INTRAVENOUS; SUBCUTANEOUS at 11:51

## 2024-01-01 RX ADMIN — INSULIN ASPART 1 UNITS: 100 INJECTION, SOLUTION INTRAVENOUS; SUBCUTANEOUS at 08:45

## 2024-01-01 RX ADMIN — LEVOTHYROXINE SODIUM 25 MCG: 0.03 TABLET ORAL at 08:18

## 2024-01-01 RX ADMIN — SODIUM CHLORIDE 500 ML: 9 INJECTION, SOLUTION INTRAVENOUS at 14:42

## 2024-01-01 RX ADMIN — QUETIAPINE FUMARATE 12.5 MG: 25 TABLET ORAL at 00:29

## 2024-01-01 RX ADMIN — ATORVASTATIN CALCIUM 40 MG: 40 TABLET, FILM COATED ORAL at 08:22

## 2024-01-01 RX ADMIN — DIGOXIN 250 MCG: 125 TABLET ORAL at 08:18

## 2024-01-01 RX ADMIN — INSULIN ASPART 1 UNITS: 100 INJECTION, SOLUTION INTRAVENOUS; SUBCUTANEOUS at 09:15

## 2024-01-01 RX ADMIN — INSULIN ASPART 1 UNITS: 100 INJECTION, SOLUTION INTRAVENOUS; SUBCUTANEOUS at 12:55

## 2024-01-01 RX ADMIN — INSULIN ASPART 1 UNITS: 100 INJECTION, SOLUTION INTRAVENOUS; SUBCUTANEOUS at 09:13

## 2024-01-01 RX ADMIN — LABETALOL HYDROCHLORIDE 10 MG: 5 INJECTION, SOLUTION INTRAVENOUS at 12:46

## 2024-01-01 RX ADMIN — ATORVASTATIN CALCIUM 40 MG: 40 TABLET, FILM COATED ORAL at 08:44

## 2024-01-01 RX ADMIN — CHLORTHALIDONE 12.5 MG: 25 TABLET ORAL at 08:44

## 2024-01-01 RX ADMIN — SODIUM CHLORIDE 500 ML: 9 INJECTION, SOLUTION INTRAVENOUS at 12:42

## 2024-01-01 RX ADMIN — SODIUM CHLORIDE, POTASSIUM CHLORIDE, SODIUM LACTATE AND CALCIUM CHLORIDE: 600; 310; 30; 20 INJECTION, SOLUTION INTRAVENOUS at 07:23

## 2024-01-01 RX ADMIN — LISINOPRIL 40 MG: 20 TABLET ORAL at 09:14

## 2024-01-01 RX ADMIN — LEVOTHYROXINE SODIUM 25 MCG: 0.03 TABLET ORAL at 08:45

## 2024-01-01 RX ADMIN — LABETALOL HYDROCHLORIDE 20 MG: 5 INJECTION, SOLUTION INTRAVENOUS at 16:56

## 2024-01-01 RX ADMIN — APIXABAN 2.5 MG: 2.5 TABLET, FILM COATED ORAL at 22:21

## 2024-01-01 RX ADMIN — DIGOXIN 250 MCG: 125 TABLET ORAL at 08:45

## 2024-01-01 RX ADMIN — APIXABAN 2.5 MG: 2.5 TABLET, FILM COATED ORAL at 08:02

## 2024-01-01 RX ADMIN — CHLORTHALIDONE 12.5 MG: 25 TABLET ORAL at 08:02

## 2024-01-01 RX ADMIN — ATORVASTATIN CALCIUM 40 MG: 40 TABLET, FILM COATED ORAL at 08:17

## 2024-01-01 RX ADMIN — SODIUM CHLORIDE, POTASSIUM CHLORIDE, SODIUM LACTATE AND CALCIUM CHLORIDE: 600; 310; 30; 20 INJECTION, SOLUTION INTRAVENOUS at 14:04

## 2024-01-01 RX ADMIN — SODIUM CHLORIDE 250 ML: 9 INJECTION, SOLUTION INTRAVENOUS at 17:36

## 2024-01-01 RX ADMIN — WARFARIN SODIUM 2.5 MG: 2.5 TABLET ORAL at 18:36

## 2024-01-01 RX ADMIN — LISINOPRIL 40 MG: 20 TABLET ORAL at 08:17

## 2024-01-01 RX ADMIN — LEVOTHYROXINE SODIUM 25 MCG: 0.03 TABLET ORAL at 06:55

## 2024-01-01 RX ADMIN — GADOBUTROL 6 ML: 604.72 INJECTION INTRAVENOUS at 14:12

## 2024-01-01 RX ADMIN — ONDANSETRON 4 MG: 4 TABLET, ORALLY DISINTEGRATING ORAL at 08:07

## 2024-01-01 RX ADMIN — CHLORTHALIDONE 12.5 MG: 25 TABLET ORAL at 09:14

## 2024-01-01 RX ADMIN — POTASSIUM CHLORIDE 40 MEQ: 1500 TABLET, EXTENDED RELEASE ORAL at 02:28

## 2024-01-01 RX ADMIN — POTASSIUM CHLORIDE 40 MEQ: 1.5 POWDER, FOR SOLUTION ORAL at 05:47

## 2024-01-01 RX ADMIN — LABETALOL HYDROCHLORIDE 10 MG: 5 INJECTION, SOLUTION INTRAVENOUS at 16:34

## 2024-01-01 RX ADMIN — WARFARIN SODIUM 5 MG: 5 TABLET ORAL at 19:12

## 2024-01-01 RX ADMIN — LISINOPRIL 40 MG: 20 TABLET ORAL at 08:44

## 2024-01-01 RX ADMIN — APIXABAN 2.5 MG: 2.5 TABLET, FILM COATED ORAL at 08:22

## 2024-01-01 RX ADMIN — DIGOXIN 250 MCG: 125 TABLET ORAL at 08:19

## 2024-01-01 RX ADMIN — HYDRALAZINE HYDROCHLORIDE 25 MG: 25 TABLET ORAL at 23:29

## 2024-01-01 RX ADMIN — CHLORTHALIDONE 12.5 MG: 25 TABLET ORAL at 08:22

## 2024-01-01 RX ADMIN — LABETALOL HYDROCHLORIDE 10 MG: 5 INJECTION, SOLUTION INTRAVENOUS at 15:24

## 2024-01-01 RX ADMIN — INSULIN ASPART 1 UNITS: 100 INJECTION, SOLUTION INTRAVENOUS; SUBCUTANEOUS at 12:48

## 2024-01-01 RX ADMIN — ATORVASTATIN CALCIUM 40 MG: 40 TABLET, FILM COATED ORAL at 08:21

## 2024-01-01 RX ADMIN — ATORVASTATIN CALCIUM 40 MG: 40 TABLET, FILM COATED ORAL at 09:14

## 2024-01-01 RX ADMIN — POTASSIUM CHLORIDE 20 MEQ: 1500 TABLET, EXTENDED RELEASE ORAL at 23:36

## 2024-01-01 ASSESSMENT — COLUMBIA-SUICIDE SEVERITY RATING SCALE - C-SSRS
2. HAVE YOU ACTUALLY HAD ANY THOUGHTS OF KILLING YOURSELF IN THE PAST MONTH?: NO
1. IN THE PAST MONTH, HAVE YOU WISHED YOU WERE DEAD OR WISHED YOU COULD GO TO SLEEP AND NOT WAKE UP?: NO
2. HAVE YOU ACTUALLY HAD ANY THOUGHTS OF KILLING YOURSELF IN THE PAST MONTH?: NO
1. IN THE PAST MONTH, HAVE YOU WISHED YOU WERE DEAD OR WISHED YOU COULD GO TO SLEEP AND NOT WAKE UP?: NO
6. HAVE YOU EVER DONE ANYTHING, STARTED TO DO ANYTHING, OR PREPARED TO DO ANYTHING TO END YOUR LIFE?: NO
1. IN THE PAST MONTH, HAVE YOU WISHED YOU WERE DEAD OR WISHED YOU COULD GO TO SLEEP AND NOT WAKE UP?: NO
6. HAVE YOU EVER DONE ANYTHING, STARTED TO DO ANYTHING, OR PREPARED TO DO ANYTHING TO END YOUR LIFE?: NO
6. HAVE YOU EVER DONE ANYTHING, STARTED TO DO ANYTHING, OR PREPARED TO DO ANYTHING TO END YOUR LIFE?: NO
2. HAVE YOU ACTUALLY HAD ANY THOUGHTS OF KILLING YOURSELF IN THE PAST MONTH?: NO

## 2024-01-01 ASSESSMENT — ACTIVITIES OF DAILY LIVING (ADL)
ADLS_ACUITY_SCORE: 45
ADLS_ACUITY_SCORE: 37
ADLS_ACUITY_SCORE: 44
ADLS_ACUITY_SCORE: 44
ADLS_ACUITY_SCORE: 45
ADLS_ACUITY_SCORE: 46
ADLS_ACUITY_SCORE: 38
ADLS_ACUITY_SCORE: 45
ADLS_ACUITY_SCORE: 46
DOING_ERRANDS_INDEPENDENTLY_DIFFICULTY: YES
ADLS_ACUITY_SCORE: 47
ADLS_ACUITY_SCORE: 41
ADLS_ACUITY_SCORE: 45
ADLS_ACUITY_SCORE: 37
ADLS_ACUITY_SCORE: 45
ADLS_ACUITY_SCORE: 40
ADLS_ACUITY_SCORE: 44
ADLS_ACUITY_SCORE: 36
ADLS_ACUITY_SCORE: 45
ADLS_ACUITY_SCORE: 46
ADLS_ACUITY_SCORE: 43
ADLS_ACUITY_SCORE: 45
ADLS_ACUITY_SCORE: 43
ADLS_ACUITY_SCORE: 41
ADLS_ACUITY_SCORE: 45
ADLS_ACUITY_SCORE: 46
DEPENDENT_IADLS:: CLEANING;COOKING;LAUNDRY;SHOPPING;MEAL PREPARATION;MEDICATION MANAGEMENT;MONEY MANAGEMENT;TRANSPORTATION;INCONTINENCE
ADLS_ACUITY_SCORE: 47
ADLS_ACUITY_SCORE: 37
CONCENTRATING,_REMEMBERING_OR_MAKING_DECISIONS_DIFFICULTY: YES
ADLS_ACUITY_SCORE: 47
DEPENDENT_IADLS:: CLEANING;COOKING;LAUNDRY;SHOPPING;MEAL PREPARATION;MEDICATION MANAGEMENT;MONEY MANAGEMENT;TRANSPORTATION;INCONTINENCE
ADLS_ACUITY_SCORE: 38
ADLS_ACUITY_SCORE: 44
DRESSING/BATHING_DIFFICULTY: YES
ADLS_ACUITY_SCORE: 46
ADLS_ACUITY_SCORE: 41
ADLS_ACUITY_SCORE: 41
ADLS_ACUITY_SCORE: 46
ADLS_ACUITY_SCORE: 38
ADLS_ACUITY_SCORE: 46
ADLS_ACUITY_SCORE: 44
ADLS_ACUITY_SCORE: 37
ADLS_ACUITY_SCORE: 41
ADLS_ACUITY_SCORE: 47
ADLS_ACUITY_SCORE: 46
ADLS_ACUITY_SCORE: 38
ADLS_ACUITY_SCORE: 45
ADLS_ACUITY_SCORE: 46
ADLS_ACUITY_SCORE: 46
TOILETING: 1-->ASSISTANCE (EQUIPMENT/PERSON) NEEDED
ADLS_ACUITY_SCORE: 40
ADLS_ACUITY_SCORE: 47
ADLS_ACUITY_SCORE: 45
ADLS_ACUITY_SCORE: 45
ADLS_ACUITY_SCORE: 36
ADLS_ACUITY_SCORE: 47
ADLS_ACUITY_SCORE: 38
ADLS_ACUITY_SCORE: 46
ADLS_ACUITY_SCORE: 38
ADLS_ACUITY_SCORE: 41
ADLS_ACUITY_SCORE: 45
ADLS_ACUITY_SCORE: 36
ADLS_ACUITY_SCORE: 46
ADLS_ACUITY_SCORE: 44
TOILETING: 1-->ASSISTANCE (EQUIPMENT/PERSON) NEEDED (NOT DEVELOPMENTALLY APPROPRIATE)
ADLS_ACUITY_SCORE: 38
ADLS_ACUITY_SCORE: 41
ADLS_ACUITY_SCORE: 46
ADLS_ACUITY_SCORE: 37
ADLS_ACUITY_SCORE: 46
ADLS_ACUITY_SCORE: 38
ADLS_ACUITY_SCORE: 45
ADLS_ACUITY_SCORE: 37
ADLS_ACUITY_SCORE: 46
ADLS_ACUITY_SCORE: 38
ADLS_ACUITY_SCORE: 46
ADLS_ACUITY_SCORE: 46
ADLS_ACUITY_SCORE: 37
ADLS_ACUITY_SCORE: 43
ADLS_ACUITY_SCORE: 45
ADLS_ACUITY_SCORE: 41
ADLS_ACUITY_SCORE: 45
WALKING_OR_CLIMBING_STAIRS_DIFFICULTY: YES
ADLS_ACUITY_SCORE: 41
ADLS_ACUITY_SCORE: 45
ADLS_ACUITY_SCORE: 46
ADLS_ACUITY_SCORE: 41
ADLS_ACUITY_SCORE: 36
ADLS_ACUITY_SCORE: 46
ADLS_ACUITY_SCORE: 38
ADLS_ACUITY_SCORE: 44
ADLS_ACUITY_SCORE: 45
DIFFICULTY_EATING/SWALLOWING: NO
ADLS_ACUITY_SCORE: 46
ADLS_ACUITY_SCORE: 45
ADLS_ACUITY_SCORE: 45
ADLS_ACUITY_SCORE: 47
TOILETING_ISSUES: YES
ADLS_ACUITY_SCORE: 38
ADLS_ACUITY_SCORE: 45
ADLS_ACUITY_SCORE: 45
ADLS_ACUITY_SCORE: 44
ADLS_ACUITY_SCORE: 45
ADLS_ACUITY_SCORE: 38
ADLS_ACUITY_SCORE: 46
ADLS_ACUITY_SCORE: 40
ADLS_ACUITY_SCORE: 45
ADLS_ACUITY_SCORE: 41
ADLS_ACUITY_SCORE: 38
ADLS_ACUITY_SCORE: 36
ADLS_ACUITY_SCORE: 45
ADLS_ACUITY_SCORE: 38
FALL_HISTORY_WITHIN_LAST_SIX_MONTHS: NO
ADLS_ACUITY_SCORE: 46
ADLS_ACUITY_SCORE: 45
ADLS_ACUITY_SCORE: 46
ADLS_ACUITY_SCORE: 37
ADLS_ACUITY_SCORE: 46
ADLS_ACUITY_SCORE: 36
ADLS_ACUITY_SCORE: 45
ADLS_ACUITY_SCORE: 45
ADLS_ACUITY_SCORE: 36
ADLS_ACUITY_SCORE: 41
ADLS_ACUITY_SCORE: 46
ADLS_ACUITY_SCORE: 45
ADLS_ACUITY_SCORE: 38
ADLS_ACUITY_SCORE: 41
ADLS_ACUITY_SCORE: 44
ADLS_ACUITY_SCORE: 41
ADLS_ACUITY_SCORE: 36
ADLS_ACUITY_SCORE: 45
CHANGE_IN_FUNCTIONAL_STATUS_SINCE_ONSET_OF_CURRENT_ILLNESS/INJURY: YES
ADLS_ACUITY_SCORE: 37
ADLS_ACUITY_SCORE: 41
ADLS_ACUITY_SCORE: 37
ADLS_ACUITY_SCORE: 46
ADLS_ACUITY_SCORE: 37
ADLS_ACUITY_SCORE: 45
ADLS_ACUITY_SCORE: 49
ADLS_ACUITY_SCORE: 38
ADLS_ACUITY_SCORE: 46
ADLS_ACUITY_SCORE: 45
ADLS_ACUITY_SCORE: 46
ADLS_ACUITY_SCORE: 46
TOILETING_ASSISTANCE: TOILETING DIFFICULTY, ASSISTANCE 1 PERSON
WEAR_GLASSES_OR_BLIND: YES
ADLS_ACUITY_SCORE: 45
ADLS_ACUITY_SCORE: 45
ADLS_ACUITY_SCORE: 46
ADLS_ACUITY_SCORE: 46
ADLS_ACUITY_SCORE: 37
ADLS_ACUITY_SCORE: 45
DIFFICULTY_COMMUNICATING: NO

## 2024-01-01 ASSESSMENT — ENCOUNTER SYMPTOMS
SHORTNESS OF BREATH: 0
COUGH: 0
DIARRHEA: 1
ABDOMINAL PAIN: 0
VOMITING: 1
NAUSEA: 1

## 2024-07-19 PROBLEM — I63.9 OCCIPITAL INFARCTION (H): Status: ACTIVE | Noted: 2024-01-01

## 2024-07-19 NOTE — ED NOTES
Bed: JNED-04  Expected date:   Expected time:   Means of arrival: Ambulance  Comments:  [place]: Weak, confusion

## 2024-07-19 NOTE — H&P
[facility]    History and Physical - Hospitalist Service       Date of Admission:  7/19/2024    Assessment & Plan      Alicia Cotaes is a 89 year old female admitted on 7/19/2024. She has a history of dementia, atrial fibrillation on warfarin, T2DM, hypothyroidism, HLD, HTN, CKD, and is admitted for management of acute ischemic stroke.     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 MRI obtained in ED shows acute ischemic changes in the left occipital lobe. Was not a candidate for lytics given time of last known well. ED discussed results with stroke neuro who recommended CTA of head and neck. CTA showed absent contrast opacification in the distal left V4 segment of the nondominant left vertebral artery suggesting severe stenosis versus short segment occlusion. Spoke with stroke neuro, no interventions at this time. Recommend SBP < 180 to prevent hemorrhagic transformation. Patient experiencing complete right hemianopia. Scoring 4 NIHSS. Hx of dementia, son states it is normal for patient to not be oriented to time or place.    -PTA warfarin dosed by pharmacy    -daily INR checks   -INR goal 2-3  -q4hr neuro checks  -BP control: goal SBP < 180  -PRN labetol & hydrazaline   -PTA  chlorthalidone 12.5 mg daily   -PTA lisinopril 40 mg daily   -LDL goal < 70   -start atorvastatin 40 mg daily   -Neurology consult. Appreciate recs.   -passed bedside swallow study  -PT & OT consults     Hx Atrial fibrillation  EKG obtained in ED showed A. Fib   -cardiac tele monitoring  -PTA warfarin  -PTA digoxin     Hypothyroidism  TSH 4.7. T4 1.23   -PTA levothyroxine dosing     T2DM  does not check BS & does not take any medications. Last A1c : 7.3 (7/13/2023)   -POC glucose checks QID   -MDSS       Diet: Regular Diet Adult    DVT Prophylaxis: Warfarin  Elena Catheter: Not  "present  Fluids: None   Lines: None     Cardiac Monitoring: ACTIVE order. Indication: Stroke, acute (48 hours)  Code Status: No CPR- Do NOT Intubate      Clinically Significant Risk Factors Present on Admission               # Drug Induced Coagulation Defect: home medication list includes an anticoagulant medication    # Hypertension: Noted on problem list                        Disposition Plan      Expected Discharge Date: 07/21/2024                The patient's care was discussed with the Attending Physician, Dr. Kirkland .      Alondra Aden MD  Hospitalist Service  [facility]  Securely message with [employer]more info)  Text page via [facility] Paging/Directory   ______________________________________________________________________    Chief Complaint   Confusion & vision changes     History is obtained from the patient & patient's son     History of Present Illness   Alicia Coates is a 89 year old female with a pertinent history of hypertension, hyperlipidemia, hypothyroidism, atrial fibrillation, skin cancer, and CKD, who presents to this ED by ambulance for evaluation of confusion which occurred this morning. She currently is a resident at [facility]. Her son came to visit her as usual. However, he noticed she was in \"distress\" in the bathroom. He saw vomit and feces in the bathroom and is unsure if she had assistance with this. He states the patient is \"unsure\" why she is present in the ED. Son reports she has a new visual change which is new. He states she can't look towards the right. He noticed she kept walking left in circles and not in a straight line. He also noticed her blood pressure was systolic in the 180's, which is also new. Family denies known falls, fevers, or chills.     Patient reports she can't remember what occurred this morning. She denies generalized pain. No other medical concerns are expressed at this time.     Past Medical History    Past Medical " History:   Diagnosis Date    Chronic atrial fibrillation (H)     CKD (chronic kidney disease)     Stage 3a    History of skin cancer     HLD (hyperlipidemia)     HTN (hypertension)     Hypothyroidism     T2DM (type 2 diabetes mellitus) (H)        Past Surgical History   Past Surgical History:   Procedure Laterality Date    BUNIONECTOMY      MOHS MICROGRAPHIC PROCEDURE      NO HISTORY OF SURGERY  06/11/2013    derm       Prior to Admission Medications   Prior to Admission Medications   Prescriptions Last Dose Informant Patient Reported? Taking?   chlorthalidone (HYGROTON) 25 MG tablet 7/19/2024 at am  Yes Yes   Sig: Take 12.5 mg by mouth daily   digoxin (LANOXIN) 0.25 MG tablet 7/19/2024 at am  Yes Yes   Sig: Take 1 tablet by mouth daily   levothyroxine (SYNTHROID/LEVOTHROID) 25 MCG tablet 7/19/2024 at am  Yes Yes   Sig: Take 25 mcg by mouth Every Mon, Tues, Wed, Thur and Fri Morning   levothyroxine (SYNTHROID/LEVOTHROID) 50 MCG tablet Past Week at sunday-due dose tmr AM  Yes Yes   Sig: Take 50 mcg by mouth See Admin Instructions SATURDAY, SUNDAY ONLY   lisinopril (ZESTRIL) 40 MG tablet 7/19/2024 at am  Yes Yes   Sig: Take 40 mg by mouth daily   warfarin ANTICOAGULANT (COUMADIN) 5 MG tablet Past Week at wed-due dose tonight  Yes Yes   Sig: Take by mouth See Admin Instructions Take 1 tab by mouth daily on Monday,Wednesday,and Fridays only   warfarin ANTICOAGULANT (COUMADIN) 7.5 MG tablet 7/18/2024 at pm  Yes Yes   Sig: Take by mouth See Admin Instructions Take 1/2 tab(3.75mg) by mouth daily on Sunday,Tuesday,Thursday,and Saturday      Facility-Administered Medications: None           Physical Exam   Vital Signs: Temp: 98  F (36.7  C) Temp src: Oral BP: (!) 169/78 Pulse: 66   Resp: 20 SpO2: 95 % O2 Device: None (Room air)    Weight: 130 lbs 0 oz    Constitutional:  Alert, in no acute distress  EYES: Conjunctivae clear  HENT:  Atraumatic, normocephalic  Respiratory:  Respirations even, unlabored, in no acute respiratory  distress  Cardiovascular:  Regular rate and rhythm, good peripheral perfusion  GI: Soft, nondistended, nontender, no palpable masses, no rebound, no guarding   Musculoskeletal:  No edema. No cyanosis. Range of motion major extremities intact.    Skin: Warm, Dry, No erythema, No rash.   Neurologic:   awake and alert and answers questions appropriately, no dysarthria  National Institutes of Health Stroke Scale  Exam Interval: Baseline    Score    Level of consciousness: (0)   Alert, keenly responsive    LOC questions: (1)   Answers one question correctly    LOC commands: (0)   Performs both tasks correctly    Best gaze: (1)   Partial gaze palsy    Visual: (2)   Complete hemianopia, right hemianopia    Facial palsy: (0)   Normal symmetrical movements    Motor arm (left): (0)   No drift    Motor arm (right): (0)   No drift    Motor leg (left): (0)   No drift    Motor leg (right): (0)   No drift    Limb ataxia: (0)   Absent    Sensory: (0)   Normal- no sensory loss    Best language: (0)   Normal- no aphasia    Dysarthria: (0)   Normal    Extinction and inattention: (0)   No abnormality          Total Score:  4      Psych: Normal mood and affect     Medical Decision Making           Data     I have personally reviewed the following data over the past 24 hrs:    8.0  \   14.3   / 200     142 99 18.2 /  219 (H)   3.9 31 (H) 0.86 \     Trop: 29 (H) BNP: N/A     TSH: 4.70 (H) T4: 1.23 A1C: N/A     INR:  2.74 (H) PTT:  N/A   D-dimer:  N/A Fibrinogen:  N/A       Imaging results reviewed over the past 24 hrs:   Recent Results (from the past 24 hour(s))   MR Brain w/o & w Contrast    Narrative    EXAM: MR BRAIN W/O and W CONTRAST  LOCATION: [facility]  DATE: 7/19/2024    INDICATION: increased confusion with vision issues  COMPARISON: 6/25/23  CONTRAST: 6ml gadavist  TECHNIQUE: Routine multiplanar multisequence head MRI without and with intravenous contrast.    FINDINGS:  INTRACRANIAL CONTENTS:     Small  ovoid region of restricted diffusion in the left occipital lobe. More remote ischemic changes noted adjacent to the left occipial lobe. Patchy and confluent nonspecific T2/FLAIR hyperintensities within the cerebral white matter most consistent with   moderate chronic microvascular ischemic change. Moderate generalized cerebral atrophy. No hydrocephalus. Normal position of the cerebellar tonsils. No pathologic contrast enhancement.    SELLA: No abnormality accounting for technique.    OSSEOUS STRUCTURES/SOFT TISSUES: Normal marrow signal. The major intracranial vascular flow voids are maintained.     ORBITS: No abnormality accounting for technique.     SINUSES/MASTOIDS: No paranasal sinus mucosal disease. No middle ear or mastoid effusion.       Impression    IMPRESSION:  1.  Acute-late acute ischemic changes in the left occipital lobe immediately adjacent to a region of remote ischemic changes in the left occipital lobe.  2.  No abnormal enhancement.   CTA Head Neck with Contrast    Narrative    EXAM: CTA HEAD NECK W CONTRAST  LOCATION: [facility]  DATE: 7/19/2024    INDICATION: vision changes and general weakness  COMPARISON: 07/19/2024 brain MRI. 06/25/2023 head CT.  CONTRAST: isovue 370 75ml  TECHNIQUE: Head and neck CT angiogram with IV contrast. Noncontrast head CT followed by axial helical CT images of the head and neck vessels obtained during the arterial phase of intravenous contrast administration. Axial 2D reconstructed images and   multiplanar 3D MIP reconstructed images of the head and neck vessels were performed by the technologist. Dose reduction techniques were used. All stenosis measurements made according to NASCET criteria unless otherwise specified.    FINDINGS:   NONCONTRAST HEAD CT:   INTRACRANIAL CONTENTS: No intracranial hemorrhage, extraaxial collection, or mass effect.  Left occipital lobe evolving infarct redemonstrated but evaluated to better effect on recent brain  MRI. Mild presumed chronic small vessel ischemic changes.   Chronic lacunar infarct in the right caudate and right lentiform nucleus. Moderate generalized volume loss. No hydrocephalus.     VISUALIZED ORBITS/SINUSES/MASTOIDS: Prior bilateral cataract surgery. Visualized portions of the orbits are otherwise unremarkable. No paranasal sinus mucosal disease. No middle ear or mastoid effusion.    BONES/SOFT TISSUES: Hyperostosis frontalis interna, an age-related incidental finding.    HEAD CTA:  Calcification of the distal internal carotid arteries bilaterally with mild narrowing. Bilateral MCA and bilateral GARETT are patent. Dominant right vertebral artery. Streak and beam hardening artifact from dental restorations obscures evaluation of the   left V3 and bilateral V4 segments. Severe stenosis versus occlusion of the distal left V4 segment. Mild narrowings in the basilar artery. Fetal origin of the left PCA. Bilateral PCA are patent. No aneurysm and no high flow vascular malformation.    NECK CTA:  RIGHT CAROTID: Mild narrowing at the right carotid bifurcation.    LEFT CAROTID: Mild narrowing at the left carotid bifurcation and proximal left internal carotid artery.    VERTEBRAL ARTERIES: No focal stenosis or dissection. Dominant right vertebral artery with a diminutive nondominant left vertebral artery.    AORTIC ARCH: Classic aortic arch anatomy with no significant stenosis at the origin of the great vessels.    NONVASCULAR STRUCTURES: Subcentimeter hypoattenuating nodule in the left thyroid lobe. At this size, follow-up is not needed. Osteopenia with degenerative change in the cervical spine.      Impression    IMPRESSION:   HEAD CT:  1.  Known evolving infarct in the left occipital lobe is evaluated to better effect on recent brain MRI. No intracranial hemorrhage.    2.  Background age-related changes and chronic ischemic changes as above.    HEAD CTA:   1.  Dental artifact obscures evaluation of the left V3 and  proximal left V4 segment. Absent contrast opacification in the distal left V4 segment of the nondominant left vertebral artery suggesting severe stenosis versus short segment occlusion.    2.  Additional mild narrowings in the basilar artery.    3.  No aneurysm and no high flow vascular malformation.    NECK CTA:  1.  Mild narrowing at the bilateral carotid bifurcation and mild narrowing in the proximal left internal carotid artery.   Chest XR,  PA & LAT    Narrative    EXAM: XR CHEST 2 VIEWS  LOCATION: [facility]  DATE: 7/19/2024    INDICATION: increased confusion. Looking for infection  COMPARISON: 6/25/2023      Impression    IMPRESSION: Marked elevation the right hemidiaphragm again noted. Lungs are clear. No signs of pneumonia or failure. Heart is enlarged but unchanged. A few, coarse calcifications are again noted in the soft tissues of the right axilla.

## 2024-07-19 NOTE — ED PROVIDER NOTES
Emergency Department Midlevel Supervisory Note     I personally saw the patient and performed a substantive portion of the visit including all aspects of the medical decision making.    ED Course:  12:55 PM Katrina Boggs PA-C staffed patient with me. I agree with their assessment and plan of management, and I will see the patient.      Brief HPI:     Alicia Coates is a 89 year old female who presents for evaluation of confusion which son noticed this morning. Found in bathroom with vomit and feces. Endorses visual disturbance of looking to the left and unable to walk straight. Noticed blood pressure was 180's systolic, which is new. She is unable to recall what occurred this morning, per family this is not baseline. No known falls, fevers, or chills.  At the moment she denies any headache or neck pain.  No chest or abdominal pain.    I, Fahad Conway, am serving as a scribe to document services personally performed by Maksim Harper MD, based on my observations and the provider's statements to me.   I, Maksim Harper MD, attest that Fahad Conway was acting in a scribe capacity, has observed my performance of the services and has documented them in accordance with my direction.    Brief Physical Exam:  Constitutional:  Alert, in no acute distress  EYES: Conjunctivae clear  HENT:  Atraumatic, normocephalic  Respiratory:  Respirations even, unlabored, in no acute respiratory distress  Cardiovascular:  Regular rate and rhythm, good peripheral perfusion  GI: Soft, nondistended, nontender, no palpable masses, no rebound, no guarding   Musculoskeletal:  No edema. No cyanosis. Range of motion major extremities intact.    Integument: Warm, Dry, No erythema, No rash.   Neurologic:   awake and alert and answers questions appropriately, no dysarthria  National Institutes of Health Stroke Scale  Exam Interval: Baseline   Score    Level of consciousness: (0)   Alert, keenly responsive    LOC questions: (0)   Answers both  questions correctly    LOC commands: (0)   Performs both tasks correctly    Best gaze: (1)   Partial gaze palsy    Visual: (2)   Complete hemianopia, right hemianopia    Facial palsy: (0)   Normal symmetrical movements    Motor arm (left): (0)   No drift    Motor arm (right): (0)   No drift    Motor leg (left): (0)   No drift    Motor leg (right): (0)   No drift    Limb ataxia: (0)   Absent    Sensory: (0)   Normal- no sensory loss    Best language: (0)   Normal- no aphasia    Dysarthria: (0)   Normal    Extinction and inattention: (0)   No abnormality        Total Score:  3      Psych: Normal mood and affect     MDM:    ED Course as of 07/19/24 1613   Fri Jul 19, 2024   1330 Patient is a 89-year-old female  with history of CKD, type 2 diabetes, hypothyroidism, hyperlipidemia and hypertension presenting to the emergency department for evaluation of generalized weakness and confusion.  Patient was last seen well sometime yesterday evening at her facility.  Then her family members note this morning she seemed confused and was reportedly walking in circles.  Patient herself denies any specific plaints outside of generalized weakness.  Denies any headache or neck pain.  No chest pain or abdominal pain.  No nausea or vomiting.    Exam patient is hypertensive and afebrile.  Heart is irregularly irregular but regular rate.  Lungs clear without any wheezes or rails.  On neuroexam she does have a right-sided hemianopsia and has difficulty crossing midline to the right side.  No facial droop.  Tongue protrudes midline, symmetric strength in upper lower extremities bilaterally.  Intact sensation to touch in upper and lower extremities bilaterally no apparent neglect.    Presentation concerning for subacute stroke.  She is not a candidate for lytics given time of last known well as well as being on warfarin.  Will obtain MRI imaging to evaluate for possible signs of CVA.       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  Labs reviewed and interpreted myself.  CBC shows a normal white count and hemoglobin.  No significant thrombocytopenia.  BMP overall is reassuring.  Urinalysis not strongly suggestive of urinary tract infection.  COVID and flu negative.  Troponin slightly elevated at 30 but stable on repeat.  Doubt ACS.    Discussed stroke neurology is recommending CTA at this point in time.  Will target a blood pressure For 180 systolic as this type of stroke is at increased risk of bleeding.    Patient will require admission for ongoing evaluation and management of her stroke.  During her time in the emergency department did develop hypotension with systolic over 200s and 10 mg IV labetalol ordered.      No diagnosis found.    Labs and Imaging:     I have reviewed the relevant laboratory and radiology studies    EKG reviewed interpreted myself:  EKG shows atrial fibrillation ventricular rate of 80 beats a minute, normal axis, T wave inversions in V3 through V6 as well as 2 and 3 which were present in prior EKG.  Relatively unchanged when compared to prior      Procedures:  I was present for the key portions of this procedure: none    Maksim Harper MD  [facility] EMERGENCY DEPARTMENT  [address] [place] [id number]  [phone]     Maksim Harper MD  07/19/24 6421

## 2024-07-19 NOTE — ED NOTES
"[facility] ED Handoff Report    ED Chief Complaint: Altered Mental Status    ED Diagnosis:  (I63.9) Occipital infarction (H)  Comment:   Plan:        PMH:    Past Medical History:   Diagnosis Date    Chronic atrial fibrillation (H)     CKD (chronic kidney disease)     Stage 3a    History of skin cancer     HLD (hyperlipidemia)     HTN (hypertension)     Hypothyroidism     T2DM (type 2 diabetes mellitus) (H)         Code Status:  Prior     Falls Risk: No Band: Not applicable    Current Living Situation/Residence: lives in an assisted living facility     Elimination Status: Continent: Yes uses bedside commode    Activity Level: SBA    Patients Preferred Language:  English     Needed: Yes    Vital Signs:  BP (!) 171/76   Pulse 72   Temp 97.6  F (36.4  C) (Oral)   Resp 25   Ht 1.626 m (5' 4\")   Wt 59 kg (130 lb)   SpO2 96%   BMI 22.31 kg/m       Cardiac Rhythm: NSR    Pain Score: 0/10    Is the Patient Confused:  Yes- Dementia    Last Food or Drink: 07/19/24 at before admittance to the ER    Focused Assessment:  Patient was noted to have increased confusion and weakness per facility. Imaging relived a Occipital infarction. BP control with meds    Tests Performed: Done: Labs and Imaging    Treatments Provided:  Medications    Family Dynamics/Concerns: No    Family Updated On Visitor Policy: Yes    Plan of Care Communicated to Family: Yes    Who Was Updated about Plan of Care: Son is at bedside     Belongings Checklist Done and Signed by Patient: Yes    Belongings Sent with Patient: Clothes    Medications sent with patient: none    Covid: asymptomatic , negative    Additional Information:     RN: Mulu Winn RN   7/19/2024 4:08 PM        "

## 2024-07-19 NOTE — ED PROVIDER NOTES
EMERGENCY DEPARTMENT ENCOUNTER      NAME: Alicia Coates  AGE: 89 year old female  YOB: 1932  MRN: [id number]  EVALUATION DATE & TIME: 7/19/2024 12:26 PM    PCP: Annita Coker    ED PROVIDER: Katrina Boggs PA-C      Chief Complaint   Patient presents with    Altered Mental Status     FINAL IMPRESSION:  1. Occipital infarction (H)      ED COURSE & MEDICAL DECISION MAKING:    Pertinent Labs & Imaging studies reviewed. (See chart for details)  89 year old female presents to the Emergency Department for evaluation of increased confusion. Patient lives in a memory care facility. This morning when patient's son went to check on patient and found her in the bathroom after she was unable to make it to the bathroom and stool and vomit was on the floor. Patient son also reports when she walks she is not able to walk in a straight line and walks in circles. Son also reports vision changes and can only see objects in her left field of vision. Patient denies being in pain. She is on blood thinners. Denies dizziness. Vitals signs reviewed and patient is hypertensive. Afebrile. On exam, patient is answering questions appropriately and follows commands. 5/5 strength in all four extremities. Normal ROM, sensation and strength in all four extremities. Cardiac and pulmonary exam is normal. EOM intact. Patient reports she is unable to see right visual fields which she reports is black.     Differential diagnosis includes stroke, hemorrage, mass, infection. WBC is 8.0. Hemoglobin of 14.3. Na and K within normal limits. Troponin is 30. Delta troponin is pending. Mg is within normal limits. TSH is 4.70. T4 is 1.23. INR is 2.74. Influenza, Covid and RSV was negative. UA has 75 josh. Urine culture is pending. EKG shows a fib. Chest XR is pending. MR shows acute late acute ischemic changes in the left occipital lobe immediately adjacent to the region of remote ischemic changes in the left occipital lobe. Patient  received fluids. Patient and family were educated on the results. Plan will be to admit the patient which patient and family agree. I spoke with Stroke Neuro who will see the patient once admitted. Stroke neuro wanted CTA of head and neck.  Stroke Neuro wanted BP under 180. 10 mg of IV labetalol ordered for the patient. I spoke with the hospitalist who agrees to admit the patient. Hospitalist will manage the BP from now on.       ED COURSE:   12:46 PM I saw the patient.   12:55 PM I staffed the patient with Dr. Harper.  2:57 PM I paged stroke neuro  3:14 PM I spoke with Stroke Neuro who recommended BP under 180. Recommended CTA head and neck.   3:23 PM Checked on the patient. Educated on results. Son reports patient went to breakfast at 0800 without walking difficulties.   3:59 PM  I spoke with the hospitalist who agrees with the admission.   ED Course as of 07/22/24 0856   Fri Jul 19, 2024   1330 Patient is a 89-year-old female  with history of CKD, type 2 diabetes, hypothyroidism, hyperlipidemia and hypertension presenting to the emergency department for evaluation of generalized weakness and confusion.  Patient was last seen well sometime yesterday evening at her facility.  Then her family members note this morning she seemed confused and was reportedly walking in circles.  Patient herself denies any specific plaints outside of generalized weakness.  Denies any headache or neck pain.  No chest pain or abdominal pain.  No nausea or vomiting.    Exam patient is hypertensive and afebrile.  Heart is irregularly irregular but regular rate.  Lungs clear without any wheezes or rails.  On neuroexam she does have a right-sided hemianopsia and has difficulty crossing midline to the right side.  No facial droop.  Tongue protrudes midline, symmetric strength in upper lower extremities bilaterally.  Intact sensation to touch in upper and lower extremities bilaterally no apparent neglect.    Presentation concerning for subacute  stroke.  She is not a candidate for lytics given time of last known well as well as being on warfarin.  Will obtain MRI imaging to evaluate for possible signs of CVA.       At the conclusion of the encounter I discussed the results of all of the tests and the disposition. The questions were answered. The patient or family acknowledged understanding and was agreeable with the care plan.     0 minutes of critical care time       Medical Decision Making  Obtained supplemental history:Supplemental history obtained?: Documented in chart and Family Member/Significant Other  Reviewed external records: External records reviewed?: No  Care impacted by chronic illness:Hyperlipidemia, Hypertension, and Other: Atrial Fibrillation  Care significantly affected by social determinants of health:N/A  Did you consider but not order tests?: In addition to work-up documented, I considered the following work up:   Did you interpret images independently?: Independent interpretation of ECG and images noted in documentation, when applicable.  Consultation discussion with other provider:Did you involve another provider (consultant, , pharmacy, etc.)?: I discussed the care with another health care provider, see documentation for details.  Admit.      MEDICATIONS GIVEN IN THE EMERGENCY:  Medications   labetalol (NORMODYNE/TRANDATE) injection 10-20 mg (10 mg Intravenous $Given 7/20/24 1246)   hydrALAZINE (APRESOLINE) injection 10-20 mg (has no administration in time range)   lidocaine 1 % 0.1-1 mL (has no administration in time range)   lidocaine (LMX4) cream (has no administration in time range)   sodium chloride (PF) 0.9% PF flush 3 mL (3 mLs Intracatheter $Given 7/22/24 0251)   sodium chloride (PF) 0.9% PF flush 3 mL (has no administration in time range)   Patient is already receiving anticoagulation with heparin, enoxaparin (LOVENOX), warfarin (COUMADIN)  or other anticoagulant medication (has no administration in time range)   labetalol  (NORMODYNE/TRANDATE) injection 10-20 mg (has no administration in time range)     Or   hydrALAZINE (APRESOLINE) injection 10-20 mg (has no administration in time range)   calcium carbonate (TUMS) chewable tablet 1,000 mg (has no administration in time range)   ondansetron (ZOFRAN ODT) ODT tab 4 mg (has no administration in time range)     Or   ondansetron (ZOFRAN) injection 4 mg (has no administration in time range)   acetaminophen (TYLENOL) tablet 650 mg (650 mg Oral $Given 7/21/24 1025)     Or   acetaminophen (TYLENOL) oral liquid 650 mg ( Oral See Alternative 7/21/24 1025)     Or   acetaminophen (TYLENOL) Suppository 650 mg ( Rectal See Alternative 7/21/24 1025)   digoxin (LANOXIN) tablet 250 mcg (250 mcg Oral $Given 7/22/24 0845)   levothyroxine (SYNTHROID/LEVOTHROID) tablet 25 mcg (25 mcg Oral $Given 7/22/24 0845)   levothyroxine (SYNTHROID/LEVOTHROID) tablet 50 mcg (50 mcg Oral $Given 7/20/24 0822)   chlorthalidone (HYGROTON) half-tab 12.5 mg (12.5 mg Oral $Given 7/22/24 0844)   lisinopril (ZESTRIL) tablet 40 mg (40 mg Oral $Given 7/22/24 0844)   senna-docusate (SENOKOT-S/PERICOLACE) 8.6-50 MG per tablet 1-2 tablet (has no administration in time range)   atorvastatin (LIPITOR) tablet 40 mg (40 mg Oral $Given 7/22/24 0844)   QUEtiapine (SEROquel) half-tab 12.5 mg (12.5 mg Oral $Given 7/20/24 0029)   hydrALAZINE (APRESOLINE) tablet 25 mg (25 mg Oral $Given 7/19/24 2329)   glucose gel 15-30 g (has no administration in time range)     Or   dextrose 50 % injection 25-50 mL (has no administration in time range)     Or   glucagon injection 1 mg (has no administration in time range)   insulin aspart (NovoLOG) injection (RAPID ACTING) (1 Units Subcutaneous $Given 7/22/24 0845)   insulin aspart (NovoLOG) injection (RAPID ACTING) (1 Units Subcutaneous $Given 7/21/24 2032)   apixaban ANTICOAGULANT (ELIQUIS) tablet 2.5 mg ( Oral Automatically Held 7/27/24 2100)   sodium chloride 0.9% BOLUS 500 mL (0 mLs Intravenous Stopped  "7/19/24 1448)   gadobutrol (GADAVIST) injection 6 mL (6 mLs Intravenous $Given 7/19/24 1412)   labetalol (NORMODYNE/TRANDATE) injection 10 mg (10 mg Intravenous $Given 7/19/24 1524)   iopamidol (ISOVUE-370) solution 75 mL (75 mLs Intravenous $Given 7/19/24 1603)   warfarin ANTICOAGULANT (COUMADIN) tablet 5 mg (5 mg Oral $Given 7/19/24 1912)   warfarin ANTICOAGULANT (COUMADIN) tablet 2.5 mg (2.5 mg Oral $Given 7/20/24 1836)       NEW PRESCRIPTIONS STARTED AT TODAY'S ER VISIT  Current Discharge Medication List         =================================================================    HPI    Patient information was obtained from: Patient & Patient's Son    Use of : N/A       Alicia Coates is a 89 year old female with a pertinent history of hypertension, hyperlipidemia, hypothyroidism, atrial fibrillation, skin cancer, and CKD, who presents to this ED by ambulance for evaluation of confusion which occurred this morning. She currently is a resident at [facility]. Her son came to visit her as usual. However, he noticed she was in \"distress\" in the bathroom. He saw vomit and feces in the bathroom and is unsure if she had assistance with this. He states the patient is \"unsure\" why she is present in the ED. Son reports she has a new visual change which is new. He states she can't look towards the right. He noticed she kept walking left in circles and not in a straight line. He also noticed her blood pressure was systolic in the 180's, which is also new. She currently is on anticoagulation for a-fib. Family denies known falls, fevers, or chills.    Patient reports she can't remember what occurred this morning. She denies generalized pain. No other medical concerns are expressed at this time.     REVIEW OF SYSTEMS   As per HPI    PAST MEDICAL HISTORY:  Past Medical History:   Diagnosis Date    Chronic atrial fibrillation (H)     CKD (chronic kidney disease)     Stage 3a    History of skin cancer  " "   HLD (hyperlipidemia)     HTN (hypertension)     Hypothyroidism     T2DM (type 2 diabetes mellitus) (H)        PAST SURGICAL HISTORY:  Past Surgical History:   Procedure Laterality Date    BUNIONECTOMY      MOHS MICROGRAPHIC PROCEDURE      NO HISTORY OF SURGERY  06/11/2013    derm       CURRENT MEDICATIONS:    No current outpatient medications on file.    ALLERGIES:  Allergies   Allergen Reactions    Neomycin Unknown    Nickel Unknown    No Clinical Screening - See Comments      black rubber mix        FAMILY HISTORY:  History reviewed. No pertinent family history.    SOCIAL HISTORY:   Social History     Socioeconomic History    Marital status:    Tobacco Use    Smoking status: Never    Smokeless tobacco: Never   Social History Narrative    Moved into [facility] in May 2023. Was living independently prior. Primary decision makers/helpers are her son Scott and daughter Cristina.      Social Determinants of Health      [facility]    Financial Resource Strain    [facility] from [facility] [employer]    Social Connections       VITALS:  /63 (BP Location: Left arm)   Pulse 64   Temp 98.4  F (36.9  C) (Oral)   Resp 20   Ht 1.626 m (5' 4\")   Wt 59 kg (130 lb)   SpO2 97%   BMI 22.31 kg/m      PHYSICAL EXAM    Physical Exam  Vitals and nursing note reviewed.   Constitutional:       General: She is not in acute distress.     Appearance: Normal appearance. She is not ill-appearing, toxic-appearing or diaphoretic.   HENT:      Head: Atraumatic.      Right Ear: External ear normal.      Left Ear: External ear normal.      Nose: Nose normal.      Mouth/Throat:      Mouth: Mucous membranes are moist.   Eyes:      General: Visual field deficit (right side of visal field is \"black\") present.      Conjunctiva/sclera: Conjunctivae normal.     "  Pupils: Pupils are equal, round, and reactive to light.   Cardiovascular:      Rate and Rhythm: Normal rate and regular rhythm.      Pulses: Normal pulses.      Heart sounds: Normal heart sounds. No murmur heard.     No friction rub. No gallop.   Pulmonary:      Effort: Pulmonary effort is normal.      Breath sounds: Normal breath sounds. No wheezing or rales.   Abdominal:      Tenderness: There is no abdominal tenderness. There is no guarding or rebound.   Musculoskeletal:      Cervical back: Normal range of motion.   Skin:     General: Skin is dry.   Neurological:      Mental Status: She is alert.      GCS: GCS eye subscore is 4. GCS verbal subscore is 5. GCS motor subscore is 6.      Cranial Nerves: No facial asymmetry.      Sensory: Sensation is intact.      Motor: Motor function is intact. No weakness or pronator drift.      Coordination: Coordination is intact. Finger-Nose-Finger Test and Heel to Shin Test normal.      Comments: 5/5 strength in all four extremities. Normal ROM, sensation and strength in all four extremities.    Psychiatric:         Mood and Affect: Mood normal.         Thought Content: Thought content normal.          LAB:  All pertinent labs reviewed and interpreted.  Labs Ordered and Resulted from Time of ED Arrival to Time of ED Departure   BASIC METABOLIC PANEL - Abnormal       Result Value    Sodium 142      Potassium 3.9      Chloride 99      Carbon Dioxide (CO2) 31 (*)     Anion Gap 12      Urea Nitrogen 18.2      Creatinine 0.86      GFR Estimate 63      Calcium 9.1      Glucose 165 (*)    TROPONIN T, HIGH SENSITIVITY - Abnormal    Troponin T, High Sensitivity 30 (*)    TSH WITH FREE T4 REFLEX - Abnormal    TSH 4.70 (*)    ROUTINE UA WITH MICROSCOPIC REFLEX TO CULTURE - Abnormal    Color Urine Light Yellow      Appearance Urine Clear      Glucose Urine Negative      Bilirubin Urine Negative      Ketones Urine Negative      Specific Gravity Urine 1.007      Blood Urine Negative      pH  Urine 7.5 (*)     Protein Albumin Urine Negative      Urobilinogen Urine <2.0      Nitrite Urine Negative      Leukocyte Esterase Urine 75 Gaye/uL (*)     RBC Urine <1      WBC Urine 3     INR - Abnormal    INR 2.74 (*)    TROPONIN T, HIGH SENSITIVITY - Abnormal    Troponin T, High Sensitivity 29 (*)    MAGNESIUM - Normal    Magnesium 1.7     INFLUENZA A/B, RSV, & SARS-COV2 PCR - Normal    Influenza A PCR Negative      Influenza B PCR Negative      RSV PCR Negative      SARS CoV2 PCR Negative     T4 FREE - Normal    Free T4 1.23     CBC WITH PLATELETS AND DIFFERENTIAL    WBC Count 8.0      RBC Count 4.64      Hemoglobin 14.3      Hematocrit 42.0      MCV 91      MCH 30.8      MCHC 34.0      RDW 13.3      Platelet Count 200      % Neutrophils 71      % Lymphocytes 19      % Monocytes 8      % Eosinophils 2      % Basophils 1      % Immature Granulocytes 1      NRBCs per 100 WBC 0      Absolute Neutrophils 5.7      Absolute Lymphocytes 1.5      Absolute Monocytes 0.6      Absolute Eosinophils 0.1      Absolute Basophils 0.1      Absolute Immature Granulocytes 0.0      Absolute NRBCs 0.0          RADIOLOGY:  Reviewed all pertinent imaging. Please see official radiology report.  CT Head w/o Contrast   Final Result   IMPRESSION: Limited study due to motion. Evolving subacute ischemic infarct in the left occipital lobe again noted. No other infarcts noted. No evidence for intracranial hemorrhage. Mild diffuse cerebral volume loss. Mild age-related cerebral white    matter changes. No midline shift. No hydrocephalus. No fractures. No sinusitis. No mastoiditis.      Echocardiogram Complete   Final Result      Chest XR,  PA & LAT   Final Result   IMPRESSION: Marked elevation the right hemidiaphragm again noted. Lungs are clear. No signs of pneumonia or failure. Heart is enlarged but unchanged. A few, coarse calcifications are again noted in the soft tissues of the right axilla.      CTA Head Neck with Contrast   Final Result    IMPRESSION:    HEAD CT:   1.  Known evolving infarct in the left occipital lobe is evaluated to better effect on recent brain MRI. No intracranial hemorrhage.      2.  Background age-related changes and chronic ischemic changes as above.      HEAD CTA:    1.  Dental artifact obscures evaluation of the left V3 and proximal left V4 segment. Absent contrast opacification in the distal left V4 segment of the nondominant left vertebral artery suggesting severe stenosis versus short segment occlusion.      2.  Additional mild narrowings in the basilar artery.      3.  No aneurysm and no high flow vascular malformation.      NECK CTA:   1.  Mild narrowing at the bilateral carotid bifurcation and mild narrowing in the proximal left internal carotid artery.      MR Brain w/o & w Contrast   Final Result   IMPRESSION:   1.  Acute-late acute ischemic changes in the left occipital lobe immediately adjacent to a region of remote ischemic changes in the left occipital lobe.   2.  No abnormal enhancement.          EKG:    Performed at: July 1, 2024 13:14    Impression: Atrial fibrillation. Incomplete right bundle branch block. Anteroseptal infarct (cited on or before 05-AUG-2021). ST & T wave abnormality, consider inferolateral ischemia. Abnormal ECG.     Rate: 80 BPM  Rhythm: Atrial fibrillation  Axis: *  -7  202  NH Interval: * ms  QRS Interval: 118 ms  QTc Interval: 376/433 ms  ST Changes: ST & T wave abnormality, consider inferolateral ischemia  Comparison: When compared with ECG of 05-AUG-2021 20:14, ST no longer depressed in Inferior leads.    I have independently reviewed and interpreted the EKG(s) documented above.    I, Fahad Conway, am serving as a scribe to document services personally performed by Katrina Boggs PA-C, based on my observation and the provider's statements to me. I, Katrina Boggs PA-C, attest that Fahad Conway is acting in a scribe capacity, has observed my performance of the services and  has documented them in accordance with my direction.    Katrina Boggs PA-C  [facility] EMERGENCY DEPARTMENT  [facility] [facility] [id number] [phone]     Katrina Boggs PA-C  07/22/24 0872

## 2024-07-19 NOTE — MEDICATION SCRIBE - ADMISSION MEDICATION HISTORY
Medication Scribe Admission Medication History    Admission medication history is complete. The information provided in this note is only as accurate as the sources available at the time of the update.    Information Source(s): Facility (U/NH/) medication list/MAR and CareEverywhere/SureScripts via phone    Pertinent Information: mar was received from [facility] [phone]     Changes made to PTA medication list:  Added: None  Deleted: None  Changed: None    Allergies reviewed with patient and updates made in EHR: yes    Medication History Completed By: TEVIN DURHAM 7/19/2024 4:19 PM    PTA Med List   Medication Sig Last Dose    chlorthalidone (HYGROTON) 25 MG tablet Take 12.5 mg by mouth daily 7/19/2024 at am    digoxin (LANOXIN) 0.25 MG tablet Take 1 tablet by mouth daily 7/19/2024 at am    levothyroxine (SYNTHROID/LEVOTHROID) 25 MCG tablet Take 25 mcg by mouth Every Mon, Tues, Wed, Thur and Fri Morning 7/19/2024 at am    levothyroxine (SYNTHROID/LEVOTHROID) 50 MCG tablet Take 50 mcg by mouth See Admin Instructions SATURDAY, SUNDAY ONLY Past Week at sunday-due dose tmr AM    lisinopril (ZESTRIL) 40 MG tablet Take 40 mg by mouth daily 7/19/2024 at am    warfarin ANTICOAGULANT (COUMADIN) 5 MG tablet Take by mouth See Admin Instructions Take 1 tab by mouth daily on Monday,Wednesday,and Fridays only Past Week at wed-due dose tonight    warfarin ANTICOAGULANT (COUMADIN) 7.5 MG tablet Take by mouth See Admin Instructions Take 1/2 tab(3.75mg) by mouth daily on Sunday,Tuesday,Thursday,and Saturday 7/18/2024 at pm

## 2024-07-19 NOTE — ED TRIAGE NOTES
Patient arrives via EMS from [facility] facility for increased confusion and generalized weakness that has been going on for over 24 hours. Facility concerned for UTI. Patient oriented to self only.      Triage Assessment (Adult)       Row Name 07/19/24 1229          Triage Assessment    Airway WDL WDL        Respiratory WDL    Respiratory WDL WDL        Skin Circulation/Temperature WDL    Skin Circulation/Temperature WDL WDL        Cardiac WDL    Cardiac WDL WDL        Peripheral/Neurovascular WDL    Peripheral Neurovascular WDL WDL        Cognitive/Neuro/Behavioral WDL    Cognitive/Neuro/Behavioral WDL X;orientation     Level of Consciousness confused  baseline     Arousal Level opens eyes spontaneously     Orientation disoriented to;place;time;situation
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
## 2024-07-20 NOTE — PROGRESS NOTES
"Speech-Language Pathology: Clinical Swallow Evaluation     07/20/24 1300   Appointment Info   Signing Clinician's Name / Credentials (SLP) Yamileth Beasley MA, CCC-SLP   General Information   Onset of Illness/Injury or Date of Surgery 07/19/24   Referring Physician Alondra Aden MD   Pertinent History of Current Problem Per ordering provider \"Alicia Coates is a 89 year old female admitted on 7/19/2024. She has a history of dementia, atrial fibrillation on warfarin, T2DM, hypothyroidism, HLD, HTN, CKD, and is admitted for management of acute ischemic stroke. \"   General Observations Patient is awake and up in chair. Generally cooperative but does not follow structured commands. Answers simple questions.   Type of Evaluation   Type of Evaluation Swallow Evaluation   Oral Motor   Oral Musculature generally intact   Facial Symmetry (Oral Motor)   Facial Symmetry (Oral Motor) WNL   Lip Function (Oral Motor)   Lip Range of Motion (Oral Motor) unable/difficult to assess   Tongue Function (Oral Motor)   Tongue ROM (Oral Motor) unable/difficult to assess   Cough/Swallow/Gag Reflex (Oral Motor)   Volitional Throat Clear/Cough (Oral Motor) unable/difficult to assess   Vocal Quality/Secretion Management (Oral Motor)   Vocal Quality (Oral Motor) WFL   Secretion Management (Oral Motor) WNL   General Swallowing Observations   Past History of Dysphagia None per EMR. Her son reports occasional dry cough but not specifically when eating and drinking. During meals he has noted that she will occasionally chew for a prolonged period of time. He reports very poor intake at home (a bite or two before stated she is full).   Respiratory Support room air   Current Diet/Method of Nutritional Intake (General Swallowing Observations, NIS) regular diet;thin liquids (level 0)  (Per family, baseline diet is likely closer to Easy to Chew. Pt lives in memory care.)   Swallowing Evaluation Clinical swallow evaluation   Clinical Swallow " Evaluation   Feeding Assistance frequent cues/help required   Clinical Swallow Evaluation Textures Trialed thin liquids;pureed;solid foods  (Meal tray)   Clinical Swallow Eval: Thin Liquid Texture Trial   Mode of Presentation, Thin Liquids cup;straw   Volume of Liquid or Food Presented 4oz   Oral Phase of Swallow WFL   Pharyngeal Phase of Swallow intact   Diagnostic Statement audible swallow but no overt s/s aspiration. Pt does best when self feeding from the cup. Unable to use a straw.   Clinical Swallow Evaluation: Puree Solid Texture Trial   Mode of Presentation, Puree spoon   Volume of Puree Presented 1oz   Oral Phase, Puree WFL   Pharyngeal Phase, Puree intact   Diagnostic Statement No overt s/s aspiration   Clinical Swallow Evaluation: Solid Food Texture Trial   Mode of Presentation spoon   Volume Presented x4 bites   Oral Phase   (Prolonged mastication x1)   Pharyngeal Phase intact   Diagnostic Statement Pt had prolonged mastication x1. When cued to take a drink of water, she then swallowed and took a sip.   Esophageal Phase of Swallow   Patient reports or presents with symptoms of esophageal dysphagia No   Swallowing Recommendations   Diet Consistency Recommendations easy to chew (level 7);thin liquids (level 0)   Supervision Level for Intake 1:1 supervision needed   Mode of Delivery Recommendations no straws   Swallowing Maneuver Recommendations   (Offer liquids every couple bites or if mastication is prolonged. Encourage self feeding.)   Monitoring/Assistance Required (Eating/Swallowing) stop eating activities when fatigue is present;monitor for cough or change in vocal quality with intake   Recommended Feeding/Eating Techniques (Swallow Eval) maintain upright sitting position for eating;provide assist with feeding   Medication Administration Recommendations, Swallowing (SLP) In puree   Instrumental Assessment Recommendations instrumental evaluation not recommended at this time   General Therapy  Interventions   Planned Therapy Interventions Dysphagia Treatment   Dysphagia treatment Instruction of safe swallow strategies;Compensatory strategies for swallowing   Clinical Impression   Criteria for Skilled Therapeutic Interventions Met (SLP Eval) Yes, treatment indicated   Risks & Benefits of therapy have been explained evaluation/treatment results reviewed;care plan/treatment goals reviewed;participants voiced agreement with care plan;participants included;patient;son   Clinical Impression Comments Clinical Swallow Evaluation completed. Patient had no overt s/s aspiration with intake. Oral motor function was WFL. Mastication was intermittently prolonged, improved with cue to take a sip. Hyolaryngeal elevation appears present upon visualization and palpation. Patient was responsive to external cues for self feeding. Discussed speech and language function with the patient's son. He stated that prior to admission she had poor short term memory but could speak in full sentences about past events. She was not oriented to time but was able to identify some family members. Since being in the hospital he reported that she is speaking in one word replies, she knows who he is, and her short term memory has further declined. Discussed SLP role with speech, language and dysphagia. SLP will defer speech-language assessment until pt returns home. She may benefit from external aids and caregiver training to support communication. Her son was in agreement. Recommend diet of Easy to Chew with strategies of up in chair, no straws, alert, 1:1 assist to encourage intake and support self feeding strategy.   SLP Total Evaluation Time   Eval: oral/pharyngeal swallow function, clinical swallow Minutes (16950) 15   SLP Goals   Therapy Frequency (SLP Eval) 2 times/week   Interventions   Interventions Quick Adds Swallowing Dysfunction   Swallowing Dysfunction &/or Oral Function for Feeding   Treatment of Swallowing Dysfunction &/or Oral  Function for Feeding Minutes (20761) 8   SLP Discharge Planning   SLP Brief overview of current status  Recommend diet of Easy to Chew with strategies of up in chair, no straws, alert, 1:1 assist to encourage intake and support self feeding strategy. Upon return to home, she may benefit from external aids and caregiver training to support communication. Her son was in agreement for SLP consult after dc.   Total Session Time   Total Session Time (sum of timed and untimed services) 23

## 2024-07-20 NOTE — PLAN OF CARE
Problem: Risk for Delirium  Goal: Improved Behavioral Control  Outcome: Progressing     Problem: Adult Inpatient Plan of Care  Goal: Absence of Hospital-Acquired Illness or Injury  Intervention: Prevent Skin Injury  Recent Flowsheet Documentation  Taken 7/20/2024 0008 by Roxana Mcghee RN  Body Position: position changed independently     Problem: Adult Inpatient Plan of Care  Goal: Absence of Hospital-Acquired Illness or Injury  Intervention: Identify and Manage Fall Risk  Recent Flowsheet Documentation  Taken 7/20/2024 0008 by Roxana Mcghee RN  Safety Promotion/Fall Prevention:   activity supervised   nonskid shoes/slippers when out of bed   Goal Outcome Evaluation: pt alert/oriented to self and situation. NIHSS=4. Denies pain. IV placed. Pt  refused tele. A-1 with walker GB.

## 2024-07-20 NOTE — PLAN OF CARE
Problem: Risk for Delirium  Goal: Improved Attention and Thought Clarity  Intervention: Maximize Cognitive Function  Recent Flowsheet Documentation  Taken 2024 0800 by Leigh Farias RN  Reorientation Measures:   clock in view   reorientation provided   Goal Outcome Evaluation:    Oriented to self, , son.  Disoriented to date, time, place, situation.  Much more confused to baseline.  Needs much encouragement to follow instructions.  Takes off gown.  Field cut to entire right side.  NIH score 4 and 5.  Needs assistance to eat meals.  Decreased appetite.  Son says this is normal for her.      Walks with assistance of one staff.  Some confusion with using walker, tends to veer to the left.      /86 around noon.  Medicated with labetolol per prn order.  Re-check was 145/70.

## 2024-07-20 NOTE — CONSULTS
Care Management Initial Consult    General Information  Assessment completed with: Children,    Type of CM/SW Visit: Initial Assessment    Primary Care Provider verified and updated as needed:     Readmission within the last 30 days:        Reason for Consult: discharge planning  Advance Care Planning:            Communication Assessment  Patient's communication style: spoken language (English or Bilingual)    Hearing Difficulty or Deaf: no   Wear Glasses or Blind: yes    Cognitive  Cognitive/Neuro/Behavioral: .WDL except, orientation  Level of Consciousness: confused  Arousal Level: opens eyes spontaneously  Orientation: disoriented to, time, situation  Mood/Behavior: calm  Best Language: 0 - No aphasia  Speech: clear    Living Environment:   People in home: facility resident     Current living Arrangements: assisted living  [facility]   Able to return to prior arrangements: other (see comments) (TBD)       Family/Social Support:  Care provided by: self, other (see comments) (facility)  Provides care for: no one, unable/limited ability to care for self     Children          Description of Support System: Supportive, Involved    Support Assessment: Adequate family and caregiver support, Adequate social supports    Current Resources:   Patient receiving home care services: No     Community Resources:    Equipment currently used at home: walker, rolling  Supplies currently used at home: Incontinence Supplies    Employment/Financial:    Does the patient's insurance plan have a 3 day qualifying hospital stay waiver?  Yes     Which insurance plan 3 day waiver is available? Alternative insurance waiver    Will the waiver be used for post-acute placement? Undetermined at this time    Lifestyle & Psychosocial Needs:  Social Determinants of Health     Food Insecurity: Not on file   Depression: Not at risk (3/1/2022)    [facility]    PHQ-2     PHQ-2 TOTAL SCORE: 0   Housing Stability: Not on file   Tobacco Use: Low Risk  (7/19/2024)    Patient History     Smoking Tobacco Use: Never     Smokeless Tobacco Use: Never     Passive Exposure: Not on file   Financial Resource Strain: High Risk (12/22/2021)    [facility], [facility]    Financial Resource Strain     Difficulty of Paying Living Expenses: Not on file     Difficulty of Paying Living Expenses: Not on file   Alcohol Use: Not on file   Transportation Needs: Not on file   Physical Activity: Not on file   Interpersonal Safety: Not on file   Stress: Not on file   Social Connections: Unknown (12/22/2021)    [facility] from [facility], [facility]    Social Connections     Frequency of Communication with Friends and Family: Not on file   Health Literacy: Not on file       Functional Status:  Prior to admission patient needed assistance:   Dependent ADLs:: Ambulation-walker, Bathing, Dressing, Incontinence, Grooming  Dependent IADLs:: Cleaning, Cooking, Laundry, Shopping, Meal Preparation, Medication Management, Money Management, Transportation, Incontinence      Additional Information:  VIV reviewed chart including therapy evaluations and discussed with MD. VIV spoke with pts son, Scott, over the phone. Scott confirms information about pts prior level of functioning. He confirms she lives at [facility] and was able to transfer and ambulate with a walker on her own in the facility. He reports that he observed therapy today and pt is not at that level anymore and likely will need a lot more assistance. He reports that he would hope she can return to [facility] but is not certain they can meet her level of need. He anticipates we won't be able to get answers about that until the main facility staff are back on Monday. He  reprots understanding that VIV will also be reaching out to [facility] to discuss pts needs and care.    VIV placed call to [facility] ([phone]) and spoke with nurse, Rima. She confirms information from Scott about pts baseline. SW provided brief update about pts increased needs. Rima reports they can likely accommodate this but the change in care will need to be discussed with the director of nursing, Jeanette, on Monday before any confirmation is done. VIV will plan to connect with Jeanette on Monday. VIV sent initial home care referral as well in case pt able to return to [facility] and needs home PT/OT services.     ELI Sanchez

## 2024-07-20 NOTE — PROGRESS NOTES
07/20/24 0835   Appointment Info   Signing Clinician's Name / Credentials (OT) Delaney E ann-marie VAUGHAN/L   Living Environment   People in Home facility resident   Current Living Arrangements other (see comments)  (memory care)   Home Accessibility no concerns   Self-Care   Usual Activity Tolerance moderate   Activity/Exercise/Self-Care Comment Son gives history.  Pt uses FWW at baseline.  Pt is able to walk to meals when asked to come to meals.  Pt is independent with toileting and is able to complete most dressing.  Pt has assist iwth all other ADLs and IADLs   General Information   Onset of Illness/Injury or Date of Surgery 07/19/24   Referring Physician Keiko   Patient/Family Therapy Goal Statement (OT) none stated   Additional Occupational Profile Info/Pertinent History of Current Problem Pt admitted with acute occipital CVA L, R visual field cut, h/o dementia   Existing Precautions/Restrictions fall   Limitations/Impairments safety/cognitive;visual   Cognitive Status Examination   Orientation Status person   Cognitive Status Comments Pt has dementia at baseline.  P follows most simple cues.  Pt able to name son in room.   Visual Perception   Impact of Vision Impairment on Function (Vision) R visual field cut   Sensory   Sensory Quick Adds sensation intact   Range of Motion Comprehensive   General Range of Motion no range of motion deficits identified   Strength Comprehensive (MMT)   General Manual Muscle Testing (MMT) Assessment no strength deficits identified   Bed Mobility   Comment (Bed Mobility) NT   Transfers   Transfer Comments Min A   Balance   Balance Comments CGA with FWW   Activities of Daily Living   BADL Assessment/Intervention toileting   Toileting   Comment, (Toileting) Min A   Clinical Impression   Criteria for Skilled Therapeutic Interventions Met (OT) Yes, treatment indicated   OT Diagnosis Impaired ADL independence   OT Problem List-Impairments impacting ADL  balance;cognition;vision;mobility   Assessment of Occupational Performance 3-5 Performance Deficits   Planned Therapy Interventions (OT) ADL retraining;balance training;transfer training;visual perception   Risk & Benefits of therapy have been explained evaluation/treatment results reviewed;participants included;patient   Clinical Impression Comments Pt seen bedside for home OT eval and treatment.  Pt demonstrates decreased independence with ADLs and mobility due to cognitive impairment and visual deficit.  OT to continue to address.  Pt will need 24 hour assist and supervision with all mobility and ADLs   OT Total Evaluation Time   OT Eval, Moderate Complexity Minutes (40089) 10   OT Goals   Therapy Frequency (OT) 5 times/week   OT Predicted Duration/Target Date for Goal Attainment 07/27/24   OT Goals Hygiene/Grooming;Upper Body Dressing;Lower Body Dressing;Transfers;Toilet Transfer/Toileting   OT: Hygiene/Grooming minimal assist   OT: Upper Body Dressing Supervision/stand-by assist   OT: Lower Body Dressing Supervision/stand-by assist   OT: Transfer Minimal assist;with assistive device   OT: Toilet Transfer/Toileting Supervision/stand-by assist   OT Discharge Planning   OT Plan R field cut, dressing, toileting, G/H   OT Discharge Recommendation (DC Rec) Long term care facility;other (see comments);home with home care occupational therapy  (memory care)   OT Rationale for DC Rec Recommend return to memory care vs LTC pending level of support pt can receive at memory care.  Pt will need therapy at either location.  Pt will need 24 hour supervision and assist with all mobility and ADLs.   OT Brief overview of current status Min A

## 2024-07-20 NOTE — PROGRESS NOTES
[facility]    Progress Note - Hospitalist Service       Date of Admission:  7/19/2024    Assessment & Plan   Alicia Coates is a 89 year old female admitted on 7/19/2024. She has a history of dementia, atrial fibrillation on warfarin, T2DM, hypothyroidism, HLD, HTN, CKD, presenting from memory care by EMS for evaluation of acute change in mental status and is admitted for management of acute ischemic stroke.     Acute Ischemic Stroke L Occipital  Left vertebral artery stenosis  Advanced dementia  Increased confusion and difficulties with ambulation continue.  Son and daughter opting for comfort focused approach, but do want to go forward with the imaging planned for today (TTE and repeat CT).  - Neurology consulted  - TTE  - SBP under 180, long term goals would be under 130/85  - A1c goals of under 7.0, need repeat A1c  - LDL goals of under 70  - Repeat Head CT today to look for hemorrhagic conversion signs (continued on warfarin with recent ischemic infarction)              - Would consider lateral move to [facility] if possible, would need cardiology visit as outpatient to consider              - Would not consider adding ASA or Plavix to warfarin given bleed risk and bleeding complications  -PTA warfarin dosed by pharmacy               -daily INR checks              -INR goal 2-3  -q4hr neuro checks  - atorvastatin 40 mg daily   -passed bedside swallow study  -PT & OT consults     Hx Atrial fibrillation  EKG obtained in ED showed A. Fib   -cardiac tele monitoring  -PTA warfarin  -PTA digoxin     Hypothyroidism  TSH 4.7. T4 1.23   -PTA levothyroxine dosing      T2DM  does not check BS & does not take any medications. Last A1c : 7.3 (7/13/2023)   -POC glucose checks QID  -MDSS          Diet: Regular Diet Adult    DVT Prophylaxis: Warfarin  Elena Catheter: Not present  Fluids: PO  Lines: None     Cardiac Monitoring: None  Code Status: No CPR- Do NOT Intubate      Clinically Significant  Risk Factors Present on Admission               # Drug Induced Coagulation Defect: home medication list includes an anticoagulant medication    # Hypertension: Noted on problem list                        Disposition Plan     Expected Discharge Date: 07/21/2024                The patient's care was discussed with the Attending Physician, Dr. Frias .    Kamron Reid MD  Hospitalist Service  [facility]  Securely message with [email or web address] (more info)  Text page via [employer] Paging/Directory   ______________________________________________________________________    Interval History     No acute events.       Physical Exam   Vital Signs: Temp: 97.4  F (36.3  C) Temp src: Oral BP: (!) 171/84 (notified nurse) Pulse: 73   Resp: 18 SpO2: 98 % O2 Device: None (Room air)    Weight: 130 lbs 0 oz    Asked by family to limit exam to let patient rest.    GEN: elderly female, in no acute distress. Up to bathroom with assist of 2  NEURO:  Awake. Disoriented more than baseline. Difficult to assess vision.  No obvious focal deficits.  Walks to bathroom with assist of 2. Moving all extremities.    EXTREMITES:  there is edema worse in LLE  SKIN: No rash on limited skin exam        Data     I have personally reviewed the following data over the past 24 hrs:    10.2  \   13.6   / 181     139 99 14.5 /  182 (H)   3.4 27 0.72 \     Trop: 29 (H) BNP: N/A     TSH: N/A T4: N/A A1C: N/A     INR:  2.83 (H) PTT:  N/A   D-dimer:  N/A Fibrinogen:  N/A       Imaging results reviewed over the past 24 hrs:   Recent Results (from the past 24 hour(s))   MR Brain w/o & w Contrast    Narrative    EXAM: MR BRAIN W/O and W CONTRAST  LOCATION: [facility]  DATE: 7/19/2024    INDICATION: increased confusion with vision issues  COMPARISON: 6/25/23  CONTRAST: 6ml gadavist  TECHNIQUE: Routine multiplanar multisequence head MRI without and with intravenous contrast.    FINDINGS:  INTRACRANIAL CONTENTS:      Small ovoid region of restricted diffusion in the left occipital lobe. More remote ischemic changes noted adjacent to the left occipial lobe. Patchy and confluent nonspecific T2/FLAIR hyperintensities within the cerebral white matter most consistent with   moderate chronic microvascular ischemic change. Moderate generalized cerebral atrophy. No hydrocephalus. Normal position of the cerebellar tonsils. No pathologic contrast enhancement.    SELLA: No abnormality accounting for technique.    OSSEOUS STRUCTURES/SOFT TISSUES: Normal marrow signal. The major intracranial vascular flow voids are maintained.     ORBITS: No abnormality accounting for technique.     SINUSES/MASTOIDS: No paranasal sinus mucosal disease. No middle ear or mastoid effusion.       Impression    IMPRESSION:  1.  Acute-late acute ischemic changes in the left occipital lobe immediately adjacent to a region of remote ischemic changes in the left occipital lobe.  2.  No abnormal enhancement.   CTA Head Neck with Contrast    Narrative    EXAM: CTA HEAD NECK W CONTRAST  LOCATION: [facility]  DATE: 7/19/2024    INDICATION: vision changes and general weakness  COMPARISON: 07/19/2024 brain MRI. 06/25/2023 head CT.  CONTRAST: isovue 370 75ml  TECHNIQUE: Head and neck CT angiogram with IV contrast. Noncontrast head CT followed by axial helical CT images of the head and neck vessels obtained during the arterial phase of intravenous contrast administration. Axial 2D reconstructed images and   multiplanar 3D MIP reconstructed images of the head and neck vessels were performed by the technologist. Dose reduction techniques were used. All stenosis measurements made according to NASCET criteria unless otherwise specified.    FINDINGS:   NONCONTRAST HEAD CT:   INTRACRANIAL CONTENTS: No intracranial hemorrhage, extraaxial collection, or mass effect.  Left occipital lobe evolving infarct redemonstrated but evaluated to better effect on  recent brain MRI. Mild presumed chronic small vessel ischemic changes.   Chronic lacunar infarct in the right caudate and right lentiform nucleus. Moderate generalized volume loss. No hydrocephalus.     VISUALIZED ORBITS/SINUSES/MASTOIDS: Prior bilateral cataract surgery. Visualized portions of the orbits are otherwise unremarkable. No paranasal sinus mucosal disease. No middle ear or mastoid effusion.    BONES/SOFT TISSUES: Hyperostosis frontalis interna, an age-related incidental finding.    HEAD CTA:  Calcification of the distal internal carotid arteries bilaterally with mild narrowing. Bilateral MCA and bilateral GARETT are patent. Dominant right vertebral artery. Streak and beam hardening artifact from dental restorations obscures evaluation of the   left V3 and bilateral V4 segments. Severe stenosis versus occlusion of the distal left V4 segment. Mild narrowings in the basilar artery. Fetal origin of the left PCA. Bilateral PCA are patent. No aneurysm and no high flow vascular malformation.    NECK CTA:  RIGHT CAROTID: Mild narrowing at the right carotid bifurcation.    LEFT CAROTID: Mild narrowing at the left carotid bifurcation and proximal left internal carotid artery.    VERTEBRAL ARTERIES: No focal stenosis or dissection. Dominant right vertebral artery with a diminutive nondominant left vertebral artery.    AORTIC ARCH: Classic aortic arch anatomy with no significant stenosis at the origin of the great vessels.    NONVASCULAR STRUCTURES: Subcentimeter hypoattenuating nodule in the left thyroid lobe. At this size, follow-up is not needed. Osteopenia with degenerative change in the cervical spine.      Impression    IMPRESSION:   HEAD CT:  1.  Known evolving infarct in the left occipital lobe is evaluated to better effect on recent brain MRI. No intracranial hemorrhage.    2.  Background age-related changes and chronic ischemic changes as above.    HEAD CTA:   1.  Dental artifact obscures evaluation of the  left V3 and proximal left V4 segment. Absent contrast opacification in the distal left V4 segment of the nondominant left vertebral artery suggesting severe stenosis versus short segment occlusion.    2.  Additional mild narrowings in the basilar artery.    3.  No aneurysm and no high flow vascular malformation.    NECK CTA:  1.  Mild narrowing at the bilateral carotid bifurcation and mild narrowing in the proximal left internal carotid artery.   Chest XR,  PA & LAT    Narrative    EXAM: XR CHEST 2 VIEWS  LOCATION: [facility]  DATE: 7/19/2024    INDICATION: increased confusion. Looking for infection  COMPARISON: 6/25/2023      Impression    IMPRESSION: Marked elevation the right hemidiaphragm again noted. Lungs are clear. No signs of pneumonia or failure. Heart is enlarged but unchanged. A few, coarse calcifications are again noted in the soft tissues of the right axilla.

## 2024-07-20 NOTE — PROGRESS NOTES
"   07/20/24 0964   Appointment Info   Signing Clinician's Name / Credentials (PT) Mago Huffon PT, DPT   Living Environment   People in Home facility resident   Current Living Arrangements other (see comments)  (memory care)   Home Accessibility no concerns   Transportation Anticipated health plan transportation   Self-Care   Usual Activity Tolerance moderate   Current Activity Tolerance fair   Equipment Currently Used at Home walker, rolling   Activity/Exercise/Self-Care Comment Son, Scott, gives history.  Pt uses FWW at baseline.  Pt is able to walk to meals when asked to come to meals.  Pt is independent with toileting and is able to complete most dressing.  Pt has assist with all other ADLs and IADLs. Son unsure how much more assistance memory care could provide.   General Information   Onset of Illness/Injury or Date of Surgery 07/19/24   Referring Physician Alondra Aden MD   Patient/Family Therapy Goals Statement (PT) none stated.   Pertinent History of Current Problem (include personal factors and/or comorbidities that impact the POC) Per chart: \"Alicia Coates is a 89 year old female admitted on 7/19/2024. She has a history of dementia, atrial fibrillation on warfarin, T2DM, hypothyroidism, HLD, HTN, CKD, and is admitted for management of acute ischemic stroke.\"   Existing Precautions/Restrictions fall   General Observations R sided hemianopia.   Cognition   Affect/Mental Status (Cognition) flat/blunted affect   Orientation Status (Cognition) other (see comments)  (dementia at baseline)   Follows Commands (Cognition) follows one-step commands   Memory Deficit (Cognition)   (dementia at baseline)   Pain Assessment   Patient Currently in Pain No   Range of Motion (ROM)   Range of Motion ROM is WNL   Strength (Manual Muscle Testing)   Strength (Manual Muscle Testing) Deficits observed during functional mobility   Bed Mobility   Bed Mobility sit-supine   Sit-Supine [place] (Bed Mobility) moderate " assist (50% patient effort);verbal cues;nonverbal cues (demo/gesture)   Bed Mobility Limitations decreased ability to use arms for pushing/pulling;decreased ability to use legs for bridging/pushing;impaired ability to control trunk for mobility   Impairments Contributing to Impaired Bed Mobility decreased strength;impaired balance   Assistive Device (Bed Mobility) bed rails;draw sheet   Transfers   Transfers sit-stand transfer   Transfer Safety Concerns Noted decreased balance during turns;losing balance backward;decreased step length;decreased sequencing ability;stepping too close to front of assistive device   Impairments Contributing to Impaired Transfers impaired balance;decreased strength   Sit-Stand Transfer   Sit-Stand [place] (Transfers) minimum assist (75% patient effort)   Assistive Device (Sit-Stand Transfers) walker, front-wheeled   Gait/Stairs (Locomotion)   [place] Level (Gait) moderate assist (50% patient effort);1 person assist;verbal cues;nonverbal cues (demo/gesture)  (modA with steering of FWW)   Assistive Device (Gait) walker, front-wheeled   Distance in Feet (Gait) 5'   Pattern (Gait) step-to   Deviations/Abnormal Patterns (Gait) festinating/shuffling;gait speed decreased;base of support, narrow;stride length decreased;weight shifting decreased   Balance   Balance other (describe)   Balance Comments fww at baseline. cga-Reny with FWW today.   Sensory Examination   Sensory Perception Comments denies N/T in BLEs   Coordination   Coordination other (see comments)   Coordination Comments hand over hand technique needed to redirect hands to AD   Clinical Impression   Criteria for Skilled Therapeutic Intervention Yes, treatment indicated   PT Diagnosis (PT) Impaired functional mobility   Influenced by the following impairments Impaired functional strength, balance, activity tolerance; R sided hemianopia   Functional limitations due to impairments impaired bed mobility, transfers, gait    Clinical Presentation (PT Evaluation Complexity) evolving   Clinical Presentation Rationale clinical judgment   Clinical Decision Making (Complexity) moderate complexity   Planned Therapy Interventions (PT) balance training;bed mobility training;gait training;home exercise program;neuromuscular re-education;motor coordination training;patient/family education;ROM (range of motion);stair training;strengthening;stretching;transfer training;progressive activity/exercise;home program guidelines   Risk & Benefits of therapy have been explained evaluation/treatment results reviewed;care plan/treatment goals reviewed;risks/benefits reviewed;participants included;patient;son   PT Total Evaluation Time   PT Eval, Moderate Complexity Minutes (37035) 7   Physical Therapy Goals   PT Frequency 5x/week   PT Predicted Duration/Target Date for Goal Attainment 07/27/24   PT Goals Bed Mobility;Transfers;Gait   PT: Bed Mobility Supervision/stand-by assist;Supine to/from sit   PT: Transfers Sit to/from stand;Assistive device;Supervision/stand-by assist   PT: Gait Supervision/stand-by assist;Rolling walker;100 feet   Interventions   Interventions Quick Adds Gait Training;Therapeutic Activity   Therapeutic Activity   Therapeutic Activities: dynamic activities to improve functional performance Minutes (64771) 5   Symptoms Noted During/After Treatment None   Treatment Detail/Skilled Intervention Eval completed. tx initiated. dep for don/doff of B slippers. sit<>stands Reny with cues for technique recliner/eob<>FWW. son steps in and has pt stand up to his hands and he helps patient arm in arm recliner>eob, PT still holding on CGA to pt belt for safety. modA sit>supine EOB. dep boost Ax2 & pt rolls L&R with VC & Reny for proper positioning in bed. Pt left supine with call light & all other needs in reach.   Gait Training   Gait Training Minutes (34693) 8   Symptoms Noted During/After Treatment (Gait Training) fatigue   Treatment  Detail/Skilled Intervention gait training with FWW, pt requiring modA and verbal cues for navigation of FWW and environmental awareness. Pt does well with having son, Scott, as external cue/goal to walk toward and to look up to for posture. Verbal cues for longer strides to decrease shuffling and improve toe clearance, improves minimally with cues.   Distance in Feet 60'   [place] Level (Gait Training) moderate assist (50% patient effort)   Physical Assistance Level (Gait Training) verbal cues;nonverbal cues (demo/gestures);1 person assist   Assistive Device (Gait Training) rolling walker  (fww)   Gait Analysis Deviations increased time in double stance;decreased step length;decreased stride length;decreased toe-to-floor clearance;decreased weight-shifting ability   Impairments (Gait Analysis/Training) balance impaired;strength decreased;vision impaired   PT Discharge Planning   PT Plan prog bed mobility, transfers, gait FWW   PT Discharge Recommendation (DC Rec) Long term care facility;home with home care physical therapy   PT Rationale for DC Rec Patient mobility below baseline, needing min-modA for transfers, bed mobility, & gait FWW. Patient impacted by R visual field deficit that may require more assistance for safety. Pending availability of assistance at her memory care, patient may need supportive environment. Recommend additional home PT while at memory care/LTC to improve patient's functional mobility.   PT Brief overview of current status Reny sit<>stand & modA gait to steer FWW.   Total Session Time   Timed Code Treatment Minutes 13   Total Session Time (sum of timed and untimed services) 20

## 2024-07-20 NOTE — PLAN OF CARE
Patient denies pain. Very lethargic tonight, sleeping most of shift. Confused, removing gown, getting up out of bed. NIHSS scoring 5. CT completed. Assist of 1 with ambulation.     Sherin Levine RN

## 2024-07-20 NOTE — PHARMACY-ANTICOAGULATION SERVICE
Clinical Pharmacy - Warfarin Dosing Consult     Pharmacy has been consulted to manage this patient s warfarin therapy.  Indication: Atrial Fibrillation  Therapy Goal: INR 2-3  Provider/Team: [facility]  Warfarin Prior to Admission: Yes  Warfarin PTA Regimen: 5 mg MWF, 3.75 mg TTSS  Dose Comments: will give usual dose    INR   Date Value Ref Range Status   07/19/2024 2.74 (H) 0.85 - 1.15 Final   06/26/2024 2.69 (H) 0.85 - 1.15 Final       Recommend warfarin 5 mg today.  Pharmacy will monitor Alicia Coates daily and order warfarin doses to achieve specified goal.      Please contact pharmacy as soon as possible if the warfarin needs to be held for a procedure or if the warfarin goals change.

## 2024-07-20 NOTE — CONSULTS
[facility] Neurology Consultation    Alicia Coates MRN# [id number]   Age: 89 year old YOB: 1932     Requesting physician: No ref. provider found  Kera Rios     Reason for Consultation: CVA     Assessment and Plan:   Assessment:  - Hx of Dementia, unspecified but likely mixed (vascular, Alzheimer) based on description  - New CVA: L-occipital w/associated L-V3/4 stenosis, likely thromboembolic over cardioembolic  - DMII neuropathy    The patient's infarction is isolated to the left occipital lobe, and given her vertebral artery narrowing on the same side, it seems a thromboembolic infarction may be more likely than a cardioembolic etiology.   In any case, the infarction occurred in the setting of increased small vessel disease risk factors (LDL, A1c, BP) as well as while on warfarin.  She has had prior complications with bleeding on warfarin, and I suspect adding ASA may give greater weight towards further complications.  If cardiology would be okay with it, a think a lateral change to Eliquis may provide a good option for stroke risk reduction moving forward.     Plan:  - TTE  - SBP under 180, long term goals would be under 130/85  - A1c goals of under 7.0, need repeat A1c  - LDL goals of under 70  - Repeat Head CT today to look for hemorrhagic conversion signs (continued on warfarin with recent ischemic infarction)   - Would consider lateral move to [facility] if possible, would need cardiology visit as outpatient to consider   - Would not consider adding ASA or Plavix to warfarin given bleed risk and bleeding complications    SHELIA Gallegos D.O.  [facility] Neurology  Pager: [phone]    Total time today (75 min) in this patient encounter was spent on pre-charting, counseling and/or coordination of care.       History of Presenting Symptoms:   Alicia Coates is a 89 year old female who presents today for evaluation of CVA.  The patient has a pertinent medical history of  "dementia, A-fib (on warfarin), HTN, DMII, and hypothyroidism.     She presented to the [place] ED 7/19/2024 with acute onset confusion.  She was found in her bathroom with vomit and feces surrounding her.  She indicated having a visual disturbance, gaze restriction to the left, and wasn't walking correct. UA showed leukocyte esterase. NIHSS was 3 (gaze palsy left, left hemianopia reported).  MRI brain showed left occipital infarction.  Stroke neurology recommended BP under 180 SBP.  CTA showed inconclusive findings given dental artifact, but there was some likely severe stenosis of the distal L-V4 artery.  LDL was 134. A1c was not yet done (last 7/13/2023 was 7.3).    I cannot find much in terms of diagnosis of dementia upon chart review by means of media tabs, external notes, or searching for prior screens or neuropsychological testing.  She doesn't seem to carry this diagnosis 10/27/2022 at an urgent care visit for elevated blood pressures, or earlier that year with her family care provider 3/1/2022.  It seems it is noted she has a history of dementia in H&P and discharge notes 6/27/2023, but not necessarily her ED notes for the same admission.  She was noted to have moved to [facility] in 5/2023 and that her children were primary decision makers while she was receiving \"level one assistance\".  OT evaluation during hospitalization did not include cognitive screen, or CPT.    Today, the patient's son does mention she has had complications with nose bleeds and ecchymosis while on warfarin in the past.      Social History:   Lives at [facility].  Family is involved in care.  Her  passed a few years ago, was living with him independently. Family noticed worsened cognition over time since his passing, leading to cognitive based decisions on care.     Medications:   Warfarin  Seroquel PRN  Lisinopril  Levothyroxine  Insulin  Atorvastatin     Physical Exam:   Vitals: BP (!) 171/84 (BP Location: " "Right arm)   Pulse 73   Temp 97.4  F (36.3  C) (Oral)   Resp 18   Ht 1.626 m (5' 4\")   Wt 59 kg (130 lb)   SpO2 98%   BMI 22.31 kg/m     General: tired but can awaken during visit.  Son speaks for her regarding recent history.  Neurologic:     Mental Status: Alert but somnolent, falling asleep during interview. Can follow multiple step commands that cross midline.  Names son, cannot name season, doesn't recall my name after one or two minutes in conversation, not aware of situation.  Says individual words back correctly and can describe items in left field, but doesn't track things into her right field.      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 PERRL. EOMI with normal smooth pursuit (stops midline when going to right). Facial sensation intact/symmetric. Facial movements symmetric. Hearing not formally tested but intact to conversation. Palate elevation symmetric. No dysarthria. Shoulder shrug strong bilaterally. Tongue protrusion midline.     Motor: No tremors or other abnormal movements observed. Muscle tone normal throughout. No pronator drift noted. Normal/symmetric rapid finger tapping but slowed b/l.     Right Left   Arm abduction 5/5 5/5   Elbow Flex 5/5 5/5   Elbow Extension 5/5 5/5   Wrist Extension 5/5 5/5   FDI  5/5 5/5   APB 5/5 5/5     5/5 5/5   Hip FLexion 5/5 5/5   Knee Flexion 5/5 5/5   Knee Extension 5/5 5/5   Dorsiflexion  5/5 5/5        Deep Tendon Reflexes: 2+/symmetric throughout upper but absent patellar and achilles. No clonus. Toes downgoing bilaterally.     Sensory: Does move legs to stimuli on both sides, but difficult to indicate if there is neglect given patient's cognitive state/reporting. Difficult reporting vibration in toes but can report in mid-leg to knees, fully intact in fingers b/l.      Coordination: Finger-nose-finger w/out dysmetria, but doesn't pay attention to target when in right visual field. Rapid alternating movements intact/symmetric with normal " speed and rhythm.     Gait: Sitting in chair.         Data: Pertinent prior to visit   Imaging:  MRI brain: 7/19/2024:  IMPRESSION:  1.  Acute-late acute ischemic changes in the left occipital lobe immediately adjacent to a region of remote ischemic changes in the left occipital lobe.  2.  No abnormal enhancement.    IMPRESSION:   HEAD CT:  1.  Known evolving infarct in the left occipital lobe is evaluated to better effect on recent brain MRI. No intracranial hemorrhage.  2.  Background age-related changes and chronic ischemic changes as above.  HEAD CTA:   1.  Dental artifact obscures evaluation of the left V3 and proximal left V4 segment. Absent contrast opacification in the distal left V4 segment of the nondominant left vertebral artery suggesting severe stenosis versus short segment occlusion.  2.  Additional mild narrowings in the basilar artery.  3.  No aneurysm and no high flow vascular malformation.  NECK CTA:  1.  Mild narrowing at the bilateral carotid bifurcation and mild narrowing in the proximal left internal carotid artery.

## 2024-07-20 NOTE — PLAN OF CARE
Patient tolerating diet. Denies pain. NIHSS scoring 3 for visual field cut. Bed alarm on, tries to get OOB multiple times, confused more at hs. Assist of 1 with ambulation.     Sherin Levine, RN    1410 Patient pulled IV out, took tele off for the third time and refuses PCD's. MD updated.

## 2024-07-21 NOTE — PROGRESS NOTES
Speech Language Therapy Discharge Summary    Reason for therapy discharge:    All goals and outcomes met, no further needs identified.    Progress towards therapy goal(s). See goals on Care Plan in [facility] electronic health record for goal details.  Goals met    Therapy recommendation(s):    Recommend diet of Easy to Chew with strategies of up in chair, no straws, alert, 1:1 assist to encourage intake and support self feeding strategy. Upon return to home, she may benefit from external aids and caregiver training to support communication/independent eating. Her son and dtr were in agreement for OP or home care SLP consult after dc.

## 2024-07-21 NOTE — PLAN OF CARE
"  Problem: Adult Inpatient Plan of Care  Goal: Patient-Specific Goal (Individualized)  Description: You can add care plan individualizations to a care plan. Examples of Individualization might be:  \"Parent requests to be called daily at 9am for status\", \"I have a hard time hearing out of my right ear\", or \"Do not touch me to wake me up as it startles  me\".  Outcome: Progressing  Goal: Absence of Hospital-Acquired Illness or Injury  Outcome: Progressing  Intervention: Identify and Manage Fall Risk  Recent Flowsheet Documentation  Taken 7/21/2024 0500 by Kirti Carlos RN  Safety Promotion/Fall Prevention:   activity supervised   clutter free environment maintained   lighting adjusted   mobility aid in reach   nonskid shoes/slippers when out of bed   patient and family education   room door open   safety round/check completed  Taken 7/21/2024 0100 by Kirti Carlos RN  Safety Promotion/Fall Prevention:   activity supervised   clutter free environment maintained   lighting adjusted   mobility aid in reach   nonskid shoes/slippers when out of bed   patient and family education   room door open   safety round/check completed  Taken 7/20/2024 2030 by Kirti Carlos RN  Safety Promotion/Fall Prevention:   activity supervised   clutter free environment maintained   lighting adjusted   mobility aid in reach   nonskid shoes/slippers when out of bed   patient and family education   room door open   safety round/check completed  Intervention: Prevent Skin Injury  Recent Flowsheet Documentation  Taken 7/21/2024 0500 by Kirti Carlos RN  Body Position: position changed independently  Device Skin Pressure Protection: absorbent pad utilized/changed  Taken 7/21/2024 0100 by Kirti Carlos RN  Body Position: position changed independently  Device Skin Pressure Protection: absorbent pad utilized/changed  Taken 7/20/2024 2030 by Kirti Carlos RN  Body Position: position changed independently  Device Skin Pressure Protection: absorbent pad " utilized/changed  Intervention: Prevent Infection  Recent Flowsheet Documentation  Taken 7/21/2024 0500 by Kirti Carlos RN  Infection Prevention: rest/sleep promoted  Taken 7/21/2024 0100 by Kirti Carlos RN  Infection Prevention: rest/sleep promoted  Taken 7/20/2024 2030 by Kirti Carlos RN  Infection Prevention: rest/sleep promoted  Goal: Optimal Comfort and Wellbeing  Outcome: Progressing  Intervention: Monitor Pain and Promote Comfort  Recent Flowsheet Documentation  Taken 7/21/2024 0500 by Kirti Carlos RN  Pain Management Interventions: emotional support  Taken 7/21/2024 0100 by Kirti Carlos RN  Pain Management Interventions: emotional support  Taken 7/20/2024 2030 by Kirti Carlos RN  Pain Management Interventions: emotional support     Problem: Risk for Delirium  Goal: Improved Attention and Thought Clarity  Outcome: Progressing  Intervention: Maximize Cognitive Function  Recent Flowsheet Documentation  Taken 7/21/2024 0500 by Kirti Carlos RN  Sensory Stimulation Regulation:   care clustered   quiet environment promoted  Reorientation Measures: clock in view  Taken 7/21/2024 0100 by Kirti Carlos RN  Sensory Stimulation Regulation:   care clustered   quiet environment promoted  Reorientation Measures: clock in view  Taken 7/20/2024 2030 by Kirti Carlos RN  Sensory Stimulation Regulation:   care clustered   quiet environment promoted  Reorientation Measures: clock in view  Goal: Improved Sleep  Outcome: Progressing  Intervention: Promote Sleep  Recent Flowsheet Documentation  Taken 7/21/2024 0500 by Kirti Carlos RN  Sleep/Rest Enhancement: room darkened  Taken 7/21/2024 0100 by Kirti Carlos RN  Sleep/Rest Enhancement: room darkened  Taken 7/20/2024 2030 by Kirti Carlos RN  Sleep/Rest Enhancement: room darkened     Problem: Fall Injury Risk  Goal: Absence of Fall and Fall-Related Injury  Outcome: Progressing  Intervention: Identify and Manage Contributors  Recent Flowsheet Documentation  Taken 7/21/2024  0500 by Kirti Carlos RN  Medication Review/Management: medications reviewed  Taken 7/21/2024 0100 by Kirti Carlos RN  Medication Review/Management: medications reviewed  Taken 7/20/2024 2030 by Kirti Carlos RN  Medication Review/Management: medications reviewed  Intervention: Promote Injury-Free Environment  Recent Flowsheet Documentation  Taken 7/21/2024 0500 by Kirti Carlos RN  Safety Promotion/Fall Prevention:   activity supervised   clutter free environment maintained   lighting adjusted   mobility aid in reach   nonskid shoes/slippers when out of bed   patient and family education   room door open   safety round/check completed  Taken 7/21/2024 0100 by Kirti Carlos RN  Safety Promotion/Fall Prevention:   activity supervised   clutter free environment maintained   lighting adjusted   mobility aid in reach   nonskid shoes/slippers when out of bed   patient and family education   room door open   safety round/check completed  Taken 7/20/2024 2030 by Kirti Carlos RN  Safety Promotion/Fall Prevention:   activity supervised   clutter free environment maintained   lighting adjusted   mobility aid in reach   nonskid shoes/slippers when out of bed   patient and family education   room door open   safety round/check completed     Problem: Stroke, Ischemic (Includes Transient Ischemic Attack)  Goal: Optimal Coping  Outcome: Progressing  Intervention: Support Psychosocial Response to Stroke  Recent Flowsheet Documentation  Taken 7/21/2024 0500 by Kirti Carlos RN  Supportive Measures: active listening utilized  Taken 7/21/2024 0100 by Kirti Carlos RN  Supportive Measures: active listening utilized  Taken 7/20/2024 2030 by Kirti Carlos RN  Supportive Measures: active listening utilized       Pt denies nausea and pain. Assist of 1 with walker and gait belt. NIH q4. Slept in between cares. Falls precautions.

## 2024-07-21 NOTE — PROGRESS NOTES
[facility] Neurology Progress Note    Alicia Coates MRN# [id number]   Age: 89 year old YOB: 1932          Assessment and Plan:   Assessment:  L-occipital infection, acute to subacute. Subsequent right homonymous hemianopsia.    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  Her repeat imaging and cardiac testing is reassuring so far, and at this time she can continue with warfarin.  I feel she is back to baseline outside of her vision impairment, and is safe to discharge from a neurology standpoint.    Plan:  - SBP under 180, long term goals would be under 130/85  - A1c goals of under 7.0, need repeat A1c  - LDL goals of under 70  - Consider Eiliquis use over warfarin as outpatient given new infarction with warfarin, but increased bleeding risks by adding ASA or Plavix    SHELIA Gallegos D.O.  [facility] Neurology  Pager: [phone]    Total time today (34 min) in this patient encounter was spent on pre-charting, counseling and/or coordination of care.       History of Presenting Symptoms:   Alicia Coates is a 89 year old female who presents today for evaluation of CVA.  The patient has a pertinent medical history of dementia, A-fib (on warfarin), HTN, DMII, and hypothyroidism.      She presented to the [place] ED 7/19/2024 with acute onset confusion.  She was found in her bathroom with vomit and feces surrounding her.  She indicated having a visual disturbance, gaze restriction to the left, and wasn't walking correct. UA showed leukocyte esterase. NIHSS was 3 (gaze palsy left, left hemianopia reported).  MRI brain showed left occipital infarction.  Stroke neurology recommended BP under 180 SBP.  CTA showed inconclusive findings given dental artifact, but  "there was some likely severe stenosis of the distal L-V4 artery.  LDL was 134. A1c was not yet done (last 7/13/2023 was 7.3).    Interval History since last visit   No new events last night. Slept well. Family feels she is cognitive back to baseline.      Physical Exam:   Vitals: BP (!) 178/83 (BP Location: Right arm)   Pulse 63   Temp 97.6  F (36.4  C) (Oral)   Resp 19   Ht 1.626 m (5' 4\")   Wt 59 kg (130 lb)   SpO2 95%   BMI 22.31 kg/m     General: Seated comfortably in no acute distress.  Neurologic:     Mental Status: Fully alert, attentive but cannot say her age, year. Can follow multiple steps that cross the midline. Can name her right and left sides. Can name daughter and son, but not birth years. Cannot name season. Speech clear and fluent, no paraphasic errors.     Cranial Nerves: EOMI with normal smooth pursuit up unto midline when moving from left to right. Homonymous hemianopsia on right side. Facial movements symmetric. Hearing not formally tested but intact to conversation.  No dysarthria.     Motor: No tremors or other abnormal movements observed. Antigravity in all extremities.     Sensory:No neglect or hemisensory loss noted today.     Coordination: Finger-nose-finger without dysmetria.     Gait: Deferred     Data: Pertinent since last visit   Imaging:  CT head: 7/20/2024:  IMPRESSION: Limited study due to motion. Evolving subacute ischemic infarct in the left occipital lobe again noted. No other infarcts noted. No evidence for intracranial hemorrhage. Mild diffuse cerebral volume loss. Mild age-related cerebral white matter changes. No midline shift. No hydrocephalus. No fractures. No sinusitis. No mastoiditis.    Procedures:  TTE: 7/20/2024:  Left ventricular function is normal.The ejection fraction is 60-65%.  There is moderate concentric left ventricular hypertrophy.  Normal right ventricle size and systolic function.  The left atrium is severely dilated.  The right atrium is severely " dilated.  There is mild (1+) mitral regurgitation.  There is moderate (2+) tricuspid regurgitation.  Moderate valvular aortic stenosis.  Right ventricular systolic pressure is elevated, consistent with mild  pulmonary hypertension.

## 2024-07-21 NOTE — PROGRESS NOTES
[facility]    Progress Note - Hospitalist Service       Date of Admission:  7/19/2024    Assessment & Plan   Alicia Coates is a 89 year old female admitted on 7/19/2024. She has a history of dementia, atrial fibrillation on warfarin, T2DM, hypothyroidism, HLD, HTN, CKD, presenting from memory care by EMS for evaluation of acute change in mental status and is admitted for management of acute ischemic stroke.      Acute Ischemic Stroke L Occipital  Left vertebral artery stenosis  Dementia  Increased confusion and difficulties with ambulation have now improved, close to baseline.  Does still have vision loss in right visual field.  Son and daughter opting for comfort focused approach.  Safe to discharge from neurology standpoint. The son will visit with the patients memory care on Monday to see if she is able to get more help with ADLs or if they need to pursue other options.  - Neurology consulted  - TTE  - SBP under 180, long term goals would be under 130/85  - A1c goals of under 7.0, need repeat A1c  - LDL goals of under 70  - Repeat Head CT today to look for hemorrhagic conversion signs (continued on warfarin with recent ischemic infarction)              - Would consider lateral move to [facility] if possible, would need cardiology visit as outpatient to consider              - Would not consider adding ASA or Plavix to warfarin given bleed risk and bleeding complications  -PTA warfarin dosed by pharmacy               -daily INR checks              -INR goal 2-3  -q4hr neuro checks  - atorvastatin 40 mg daily   -passed bedside swallow study  -PT & OT consults     Essential HTN  Her BPs have been 170s systolic inpatient.  Can adjust medications outpatient as necessary, taking goals of care in mind an wanting to prevent hypotension/falls.     Hx Atrial fibrillation  EKG obtained in ED showed A. Fib   -cardiac tele monitoring  -PTA warfarin  -PTA digoxin      Hypothyroidism  TSH 4.7. T4  1.23   -PTA levothyroxine dosing      T2DM  Last A1c : 7.3 (7/13/2023)   -POC glucose checks QID  -MDSS          Diet:  Regular adult diet  DVT Prophylaxis: Warfarin  Elena Catheter: Not present  Fluids: PO  Lines: None     Cardiac Monitoring: None  Code Status: No CPR- Do NOT Intubate      Clinically Significant Risk Factors                  # Hypertension: Noted on problem list                         Disposition Plan     Expected Discharge Date: 07/22/2024      Destination: home            Close to baseline aside from vision changes  Safe to discharge per neurology  The son will visit with the patients memory care on Monday to see if she is able to get more help with ADLs or if they need to pursue other options.    The patient's care was discussed with the Attending Physician, Dr. Frias .    Kamron Reid MD  Hospitalist Service  [facility]  Securely message with [employer] (more info)  Text page via [employer] Paging/Directory   ______________________________________________________________________    Interval History   No acute events overnight    Mentation appears substantially improved this morning      Physical Exam   Vital Signs: Temp: 97.6  F (36.4  C) Temp src: Oral BP: (!) 178/83 (notified nurse) Pulse: 63   Resp: 19 SpO2: 95 % O2 Device: None (Room air)    Weight: 130 lbs 0 oz    GEN: Pleasant female, in no acute distress.  NEURO:  Awake. Still disoriented but mentation is much improved compared to yesterday. Cranial nerves 2-12 grossly intact. Moving all extremities.    HEENT: Normocephalic, atraumatic. Extraoccular eye movements intact. Anicteric sclera. Moist mucous membranes. I did note the right-sided visual field deficits today  PULM: Non-labored breathing. No use of accessory muscles. Clear to ausculation bilaterally. No wheezes or crackles.   CV: Regular rate and rhythm.   ABDOMEN: Normoactive bowel sounds. Non-tender to palpation. Non-distended.    EXTREMITES:  No  clubbing, cyanosis, or edema.    SKIN: No rash on limited skin exam        Data     I have personally reviewed the following data over the past 24 hrs:    8.2  \   14.5   / 200     140 100 16.7 /  141 (H)   3.6 29 0.86 \     INR:  3.07 (H) PTT:  N/A   D-dimer:  N/A Fibrinogen:  N/A       Imaging results reviewed over the past 24 hrs:   Recent Results (from the past 24 hour(s))   Echocardiogram Complete   Result Value    LVEF  60-65%    Narrative    [id number]  [id number]  [id number]  [id number]^KRISSY^ELVIE^[place] [address]     Name: ABIEL PENN  MRN: [id number]  : 1932  Study Date: 2024 01:19 PM  Age: 89 yrs  Gender: Female  Patient Location: [facility]  Reason For Study: Cerebrovascular Incident  Ordering Physician: ELVIE REY  Performed By: LUZ     BSA: 1.6 m2  Height: 64 in  Weight: 130 lb  HR: 52  BP: 145/70 mmHg  ______________________________________________________________________________  Procedure  Complete Portable Echo Adult.  ______________________________________________________________________________  Interpretation Summary     Left ventricular function is normal.The ejection fraction is 60-65%.  There is moderate concentric left ventricular hypertrophy.  Normal right ventricle size and systolic function.  The left atrium is severely dilated.  The right atrium is severely dilated.  There is mild (1+) mitral regurgitation.  There is moderate (2+) tricuspid regurgitation.  Moderate valvular aortic stenosis.  Right ventricular systolic pressure is elevated, consistent with mild  pulmonary hypertension.  ______________________________________________________________________________  Left Ventricle  Left ventricular function is normal.The ejection fraction is 60-65%. There is  moderate concentric left ventricular hypertrophy. Diastolic function not  assessed due to atrial fibrillation. No regional wall motion abnormalities  noted.      Right Ventricle  Normal right ventricle size and systolic function. TAPSE is abnormal, which is  consistent with abnormal right ventricular systolic function.     Atria  The left atrium is severely dilated. The right atrium is severely dilated.     Mitral Valve  Mitral valve leaflets appear normal. There is no evidence of mitral stenosis  or clinically significant mitral regurgitation. There is moderate mitral  annular calcification. There is mild (1+) mitral regurgitation.     Tricuspid Valve  The tricuspid valve is not well visualized, but is grossly normal. There is  moderate (2+) tricuspid regurgitation. The right ventricular systolic pressure  is approximated at 31.6 mmHg plus the right atrial pressure. Right ventricular  systolic pressure is elevated, consistent with mild pulmonary hypertension.     Aortic Valve  The aortic valve is trileaflet. Moderate valvular aortic stenosis. The mean  AoV pressure gradient is 19.0 mmHg.     Pulmonic Valve  The pulmonic valve is not well seen, but is grossly normal. There is trace to  mild pulmonic valvular regurgitation.     Vessels  The aorta root is normal. Normal size ascending aorta. IVC diameter and  respiratory changes fall into an intermediate range suggesting an RA pressure  of 8 mmHg.     Pericardium  There is no pericardial effusion.     Rhythm  The rhythm was atrial fibrillation.  ______________________________________________________________________________  MMode/2D Measurements & Calculations     IVSd: 1.8 cm  LVIDd: 3.7 cm  LVIDs: 2.6 cm  LVPWd: 1.7 cm  FS: 30.9 %  LV mass(C)d: 270.6 grams  LV mass(C)dI: 166.2 grams/m2  Ao root diam: 3.1 cm  LVOT diam: 2.0 cm  LVOT area: 3.1 cm2  Ao root diam index Ht(cm/m): 1.9  Ao root diam index BSA (cm/m2): 1.9  EF Biplane: 57.5 %  LA Volume (BP): 160.0 ml     LA Volume Index (BP): 98.2 ml/m2  LA Volume Indexed (AL/bp): 103.7 ml/m2  RV Base: 4.5 cm  RWT: 0.92  TAPSE: 1.5 cm     Doppler Measurements & Calculations  MV E  max jeremiah: 151.0 cm/sec  MV A max jeremiah: 41.3 cm/sec  MV E/A: 3.7  MV max P.4 mmHg  MV mean P.0 mmHg  MV V2 VTI: 35.8 cm  MVA(VTI): 1.9 cm2     MV dec time: 0.22 sec  Ao V2 max: 304.8 cm/sec  Ao max P.0 mmHg  Ao V2 mean: 194.0 cm/sec  Ao mean P.0 mmHg  Ao V2 VTI: 55.4 cm  GLORIA(I,D): 1.3 cm2  GLORIA(V,D): 1.3 cm2  LV V1 max P.5 mmHg  LV V1 max: 127.3 cm/sec  LV V1 VTI: 22.2 cm  SV(LVOT): 69.7 ml  SI(LVOT): 42.8 ml/m2  PA acc time: 0.10 sec  PI end-d jeremiah: 113.0 cm/sec  TR max jeremiah: 281.0 cm/sec  TR max P.6 mmHg  AV Jeremiah Ratio (DI): 0.42  GLORIA Index (cm2/m2): 0.77  E/E': 18.4  E/E' av.6  Lateral E/e': 18.5  Medial E/e': 18.8  Peak E' Jeremiah: 8.2 cm/sec  RV S Jeremiah: 11.5 cm/sec     ______________________________________________________________________________  Report approved by: Alvin Snow 2024 05:11 PM         CT Head w/o Contrast    Narrative    EXAM: CT HEAD W/O CONTRAST  LOCATION: [facility]  DATE: 2024    INDICATION: Repeat CT to look for hemorrhagic conversion.  COMPARISON: Brain MR 2024.  TECHNIQUE: Routine CT Head without IV contrast. Multiplanar reformats. Dose reduction techniques were used.      Impression    IMPRESSION: Limited study due to motion. Evolving subacute ischemic infarct in the left occipital lobe again noted. No other infarcts noted. No evidence for intracranial hemorrhage. Mild diffuse cerebral volume loss. Mild age-related cerebral white   matter changes. No midline shift. No hydrocephalus. No fractures. No sinusitis. No mastoiditis.

## 2024-07-22 NOTE — PLAN OF CARE
Goal Outcome Evaluation:       Patient denies pain this shift.  Scoring 5 on NIH - discontinued.  Blood sugars covered per orders.  Tolerating an easy chew diet.  Bed alarm in place, Assist x 1-2 with walker.  Does not use call light - frequent checks.  Family at bedside.

## 2024-07-22 NOTE — PROGRESS NOTES
[facility]    Progress Note - Hospitalist Service       Date of Admission:  7/19/2024    Assessment & Plan   Alicia Coates is a 89 year old female admitted on 7/19/2024. She has a history of dementia, atrial fibrillation on warfarin, T2DM, hypothyroidism, HLD, HTN, CKD, presenting from memory care by EMS for evaluation of acute change in mental status and is admitted for management of acute ischemic stroke.      Acute Ischemic Stroke L Occipital  Left vertebral artery stenosis  Dementia  Increased confusion and difficulties with ambulation have now improved, close to baseline.  Does still have vision loss in right visual field.  Son and daughter opting for comfort focused approach.  Safe to discharge from neurology standpoint. The son will visit with the patients memory care on Monday to see if she is able to get more help with ADLs or if they need to pursue other options.  - Neurology consulted  - TTE  - SBP under 180, long term goals would be under 130/85  - A1c goals of under 7.0, need repeat A1c  - LDL goals of under 70  - Repeat Head CT today to look for hemorrhagic conversion signs (continued on warfarin with recent ischemic infarction)              - Would consider move to [facility] if possible, would need cardiology visit as outpatient to consider              - Would not consider adding ASA or Plavix to warfarin given bleed risk and bleeding complications  -PTA warfarin dosed by pharmacy held, plan is to switch to Eliquis when INR<2               -INR goal 2-3  -q4hr neuro checks  - atorvastatin 40 mg daily   -passed bedside swallow study  -PT & OT consults      Essential HTN  Her BPs have been 170s systolic inpatient.  Can adjust medications outpatient as necessary, taking goals of care in mind an wanting to prevent hypotension/falls.     Hx Atrial fibrillation  EKG obtained in ED showed A. Fib   -cardiac tele monitoring  -PTA warfarin discontinued  -PTA digoxin       Hypothyroidism  TSH 4.7. T4 1.23   -PTA levothyroxine dosing      T2DM  Last A1c : 7.3 (7/13/2023)   -POC glucose checks QID  -MDSS        Diet:  regular diet  DVT Prophylaxis: will switch to eliquis  Elena Catheter: Not present  Fluids: PO  Lines: None     Cardiac Monitoring: None  Code Status: No CPR- Do NOT Intubate      Clinically Significant Risk Factors                  # Hypertension: Noted on problem list                         Disposition Plan     Expected Discharge Date: 07/22/2024      Destination: home          The patient's care was discussed with the Attending Physician,  :Rhys .    Yenny Conteh MD  Hospitalist Service  [facility]  Securely message with [employer] (more info)  Text page via [employer] Paging/Directory   ______________________________________________________________________    Interval History   No acute events overnight.  Son   Scott is at bedside and wants to know if patient will be able to return to her previous facility.  Patient is alert denies pain or shortness of breath.     Physical Exam   Vital Signs: Temp: 98.4  F (36.9  C) Temp src: Oral BP: 134/63 Pulse: 64   Resp: 20 SpO2: 97 % O2 Device: None (Room air)    Weight: 130 lbs 0 oz    General Appearance: Alert and oriented to self, no acute distress  Respiratory: clear breath sounds bilaterally, no wheezing  Cardiovascular: normal S1, S2, no murmur  GI: soft, non distended, non tender on exam  Skin: normal appearance and turgor  Other: No lower limb edema     Medical Decision Making             Data     I have personally reviewed the following data over the past 24 hrs:    6.9  \   14.4   / 212     137 100 22.9 /  145 (H)   3.4 29 0.93 \     INR:  3.40 (H) PTT:  N/A   D-dimer:  N/A Fibrinogen:  N/A       Imaging results reviewed over the past 24 hrs:   No results found for this or any previous visit (from the past 24 hour(s)).

## 2024-07-22 NOTE — PLAN OF CARE
"  Problem: Risk for Delirium  Goal: Improved Behavioral Control  Intervention: Prevent and Manage Agitation  Recent Flowsheet Documentation  Taken 7/22/2024 0000 by Kristen Coker, RN  Environment Familiarity/Consistency: familiar objects from home provided     Problem: Risk for Delirium  Goal: Improved Sleep  Intervention: Promote Sleep  Recent Flowsheet Documentation  Taken 7/22/2024 0000 by Kristen Coker, RN  Sleep/Rest Enhancement:   awakenings minimized   noise level reduced   regular sleep/rest pattern promoted   room darkened     Problem: Adult Inpatient Plan of Care  Goal: Absence of Hospital-Acquired Illness or Injury  Intervention: Identify and Manage Fall Risk  Recent Flowsheet Documentation  Taken 7/22/2024 0000 by Kristen Coker, RN  Safety Promotion/Fall Prevention:   activity supervised   lighting adjusted   increase visualization of patient   room door open   Goal Outcome Evaluation:  Patient A&O x 1; self only; assist x 1 with gait belt and FWW. Sets off bed alarm x 3 overnight for toileting needs. Verbalizes understanding to use call light for assistance but does not. Admitted for increased confusion r/t left occipital infarct. Patient c/o thirst. Cranberry juice and fresh ice water provided. PIV to left hand; saline locked; patent. NIHSS 5 for right visual field defect. Anticipated discharge back to memory care when medically stable.  Bed to low position; bed alarm set; call light within reach; non slip footwear in place. Patient able to verbalize needs.   /79 (BP Location: Left arm)   Pulse 73   Temp 97.5  F (36.4  C) (Oral)   Resp 22   Ht 1.626 m (5' 4\")   Wt 59 kg (130 lb)   SpO2 96%   BMI 22.31 kg/m                          "

## 2024-07-22 NOTE — PROGRESS NOTES
Care Management Follow Up    Length of Stay (days): 3    Expected Discharge Date: 07/22/2024     Concerns to be Addressed: discharge planning     Patient plan of care discussed at interdisciplinary rounds: Yes    Anticipated Discharge Disposition: Assisted Living, Home Care     Anticipated Discharge Services: None  Anticipated Discharge DME: None    Patient/family educated on Medicare website which has current facility and service quality ratings: no  Education Provided on the Discharge Plan: Yes  Patient/Family in Agreement with the Plan: yes    Referrals Placed by CM/VIV: Homecare  Private pay costs discussed: Not applicable    Additional Information:  SW left voicemail for ROGELIO Reid at [facility] requesting call back. SW met with Pt's son Alton outside of Pt's room and updated him. SW left another voicemail for ROGELIO Reid requesting call back.    SHARON MonahanW

## 2024-07-22 NOTE — PLAN OF CARE
Goal Outcome Evaluation:       Stroke Education Note    The following information was reviewed with patient and family:    - Activation of emergency medical system (when to call 911)    - Individualized risk factors for stroke:      high blood pressure     high cholesterol     atrial fibrillation    - Warning signs and symptoms of stroke:   B = Balance loss   E = Eyesight changes   F = Facial droop or numbness   A = Arm or leg weakness   S = Speech difficulty, slurred speech   T = Time to call 911 for help    Written stroke educational materials given:   - Learning about BE FAST: Stroke Warning Signs and Learning about Risk Factors for Stroke (Healthwise)   - Understanding Stroke: Key Resources After a Stroke (FOD #578729)    Learner's response to education:     desire to change     Latyoa Sherman, RN

## 2024-07-22 NOTE — PLAN OF CARE
Problem: Stroke, Ischemic (Includes Transient Ischemic Attack)  Goal: Optimal Functional Ability  Intervention: Optimize Functional Ability  Recent Flowsheet Documentation  Taken 7/22/2024 1630 by Heidi Mahoney, RN  Activity Management:   activity encouraged   activity adjusted per tolerance   ambulated to bathroom   up in chair   Goal Outcome Evaluation:      Plan of Care Reviewed With: patient    Overall Patient Progress: improvingOverall Patient Progress: improving    Problem: Adult Inpatient Plan of Care  Goal: Absence of Hospital-Acquired Illness or Injury  Intervention: Identify and Manage Fall Risk  Recent Flowsheet Documentation  Taken 7/22/2024 1630 by Heidi Mahoney, RN  Safety Promotion/Fall Prevention:   activity supervised   clutter free environment maintained   room door open   patient and family education   safety round/check completed   supervised activity          Tight field cut, with some right sided neglect.  Needs cues and reminders to use right hand on right side of her body or to place her right hand onto her walker.  Requires guidance to direct walker as to not run into bed, doors or walls.  Assist of one with gait belt and front wheeled walker.  Up to chair for dinner.  Denies any pain, earlier had requested some acetaminophen for a headache, and declined acetaminophen when brought to room.  BP of 150/65, continue to monitor PRN order for labetalol or hydralazine if SBP >220.

## 2024-07-22 NOTE — DISCHARGE INSTRUCTIONS
Your risk factors for stroke or TIA (transient ischemic attack):     Your Risk Factors Your Results Goals   [x] High blood pressure BP: 130/68 (07/22/24 1115) Less than 120/80   [x] Cholesterol          Total 7/19/2024: 207 mg/dL   Less than 150    Triglycerides   7/19/2024: 193 mg/dL Less than 150    LDL 7/19/2024: 134 mg/dL    Less than 70    HDL 7/19/2024: 34 mg/dL         Greater than 40 (men)  Greater than 50 (women)   [] Diabetes                A1C No lab value available in past 30 days Less than 5.7   [x] Atrial fibrillation Atrial fibrillation noted on cardiac monitoring Manage per physician orders   [] Smoking/tobacco use   Tobacco Use      Smoking status: Never      Smokeless tobacco: Never   Quit smoking and tobacco   [] Overweight Body mass index is 22.31 kg/m .  Less than 25     Other risk factors include: carotid (neck) artery disease, other heart diseases, prior stroke or TIA, poor diet, lack of exercise, and excessive alcohol consumption.     [] Written stroke educational materials given to patient including:   - Learning about BE FAST: Stroke Warning Signs and Learning about Risk Factors for Stroke (Healthwise)   - Understanding Stroke: Key Resources After a Stroke (FOD #852671)       Know the warning signs and symptoms of stroke: BE FAST     B = Balance loss   E = Eyesight changes   F = Facial droop or numbness   A = Arm or leg weakness   S = Speech difficulty, slurred speech   T = Time to call 911 for help

## 2024-07-22 NOTE — PLAN OF CARE
Problem: Adult Inpatient Plan of Care  Goal: Optimal Comfort and Wellbeing  Outcome: Progressing     Problem: Risk for Delirium  Goal: Optimal Coping  Outcome: Progressing   Goal Outcome Evaluation:    Pt is alert, oriented to self only. On room air. Denies pain or discomfort. Scoring 6 on NIHSS. Pt up in chair for dinner, poor appetite- encouraged small frequent snacks. Resting on and off. Pt does not use call light, rounded frequently.     Glenny Jackson RN.

## 2024-07-22 NOTE — PROGRESS NOTES
SPIRITUAL HEALTH SERVICES - Brief Note  [facility]    Referral Source: follow up    I researched the origins of [facility] for pt Alicia, I updated her on my findings and we had a talk about who Ru the Jehovah's witness is. We prayed for her and her children (son and daughter) both of whom are adopted. She expressed her love for them and hope that they remain safe.    Plan: [facility] to follow up as available.     Time spent with patient: 10 min     Kavya Jennings Mdiv '26    Intern      [facility] available 24/7 for emergent requests/referrals, either by paging the on-call  or by entering an ASAP/STAT consult in Epic (this will also page the on-call ).

## 2024-07-22 NOTE — CONSULTS
"SPIRITUAL HEALTH SERVICES - Brief Note  [facility]    Referral Source: routine consult/length of stay    Pt Alicia only asked if I knew \"what [facility] the [facility] was [facility]. She did not have any further questions or need of support.     Plan: I will follow up with information and [facility] is available as needed.     Time spent with patient: 5 min     Kavya Jennings Mdiv '26    Intern      [facility] available 24/7 for emergent requests/referrals, either by paging the on-call  or by entering an ASAP/STAT consult in Epic (this will also page the on-call ).    "

## 2024-07-23 NOTE — PLAN OF CARE
Physical Therapy Discharge Summary    Reason for therapy discharge:    Discharged to long term care facility.    Progress towards therapy goal(s). See goals on Care Plan in [facility] electronic health record for goal details.  Goals partially met.  Barriers to achieving goals:   discharge from facility.    Therapy recommendation(s):    Continued therapy is recommended.  Rationale/Recommendations:  recommend continued PT at LT.    Dayana Bedolla, PT  7/23/2024

## 2024-07-23 NOTE — DISCHARGE SUMMARY
[facility]  Discharge Summary - Medicine & Pediatrics       Date of Admission:  7/19/2024  Date of Discharge:  7/23/2024  Discharging Provider: DAWNA Gould, Pantera Frias MD  Discharge Service: Hospitalist Service    Discharge Diagnoses   Occipital infarction (H)  Hypertensive urgency         Clinically Significant Risk Factors          Follow-ups Needed After Discharge   Follow-up Appointments     Follow-up and recommended labs and tests       Follow up with primary care provider, FRANDY DOW, within 7 days for   hospital follow- up.  Needs Daily INRs          Discharge Disposition   Discharged to nursing home  Condition at discharge: Stable    Hospital Course   Alicia Coates was admitted on 7/19/2024 for an acute ischemic stroke. The following problems were addressed during her hospitalization:         Acute Ischemic Stroke L Occipital  Left vertebral artery stenosis  Hypertensive urgency  Dementia  Visual impairment  Patient presented to the ED with increased confusion and visual changes.  MRI obtained in the ED showed ischemic changes in the left occipital lobe.  Patient was not a candidate for lytics given time of past normal.  Further workup including echocardiogram reassuring.  Sroke neuro team was consulted recommendation was to continue blood pressure control, start statin therapy and to consider switching from warfarin to Eliquis. Permissive hypertension for the 24 hours.  During hospitalization patient remained having issues with confusion and is currently close to baseline. Does still have vision loss in right visual field.  Had discussion with social work who contacted patient's memory care reporting that they will be able to meet patient's current needs.  Will send out PT/OT/home care to help assist further.  ConContinue PTA Lipitortinue Lipitor 40 mg daily  -Lipitor 40 mg daily  -INR checks daily  -Start Eliquis 2.5 mg twice daily once INR < 2  -PT & OT  consults   -Home Care Referral  Follow-up with nursing home physician within one week discharge      Essential HTN  Her BPs have been 170s systolic inpatient.  Initially had permissive hypertension. can adjust medications outpatient as necessary, taking goals of care in mind an wanting to prevent hypotension/falls.  -Continue PTA chlorthalidone and losartan     Hx Atrial fibrillation  EKG obtained in ED showed A. Fib, heart rate remained stable on telemetry.  Switching from warfarin to Eliquis.  Continue PTA digoxin.       Hypothyroidism  TSH 4.7. T4 1.23.  Continue PTA levothyroxine dosing      T2DM  Last A1c : 7.3 (7/13/2023).  Not on PTA diabetes medication. On MSSI during admission. glucose remained stable during hospitalization.    Consultations This Hospital Stay   NEUROLOGY IP STROKE CONSULT  SPEECH LANGUAGE PATH ADULT IP CONSULT  PHYSICAL THERAPY ADULT IP CONSULT  OCCUPATIONAL THERAPY ADULT IP CONSULT  CARE MANAGEMENT / SOCIAL WORK IP CONSULT  PHARMACY TO DOSE WARFARIN  PHARMACY IP CONSULT  NEUROLOGY IP CONSULT  [facility]ospit[facility] in [place] [facility] IP CONSULT    Code Status   No CPR- Do NOT Intubate       The patient was discussed with Dr. Rodriguez Conteh MD  [place] [facility] [place] [address]  Phone: [phone]  Fax: [phone]  ______________________________________________________________________    Physical Exam   Vital Signs: Temp: 97.7  F (36.5  C) Temp src: Oral BP: 129/66 Pulse: 72   Resp: 18 SpO2: 94 % O2 Device: None (Room air)    Weight: 130 lbs 0 oz  General Appearance: Alert, .   oriented to self only, calm  Respiratory: Clear breath sounds bilaterally, no wheezing  Cardiovascular: Normal S1, S2, no murmur  GI: Soft, nontender, no guarding or palpable mass.  Skin: Clear skin with normal turgor and appearance.  MSK no lower limb edema bilaterally.      Primary Care Physician   FRANDY DOW    Discharge Orders      Home  Care Referral      Physical Therapy  Referral      Occupational Therapy  Referral      Reason for your hospital stay    Patient was admitted with visual impairment and found to have a stroke.     Follow-up and recommended labs and tests     Follow up with primary care provider, FRANDY DOW, within 7 days for hospital follow- up.  No follow up labs or test are needed. Daily INRs     Activity    Your activity upon discharge: activity as tolerated     Diet    Follow this diet upon discharge: Orders Placed This Encounter  Regular diet       Significant Results and Procedures   Most Recent 3 CBC's:  Recent Labs   Lab Test 07/23/24  0623 07/22/24  0711 07/21/24  0718   WBC 10.0 6.9 8.2   HGB 15.1 14.4 14.5   MCV 90 90 90    212 200     Most Recent 3 BMP's:  Recent Labs   Lab Test 07/23/24  1147 07/23/24  0729 07/23/24  0623 07/22/24  0812 07/22/24  0711 07/21/24  0810 07/21/24  0718   NA  --   --  138  --  137  --  140   POTASSIUM  --   --  3.6  --  3.4  --  3.6   CHLORIDE  --   --  99  --  100  --  100   CO2  --   --  26  --  29  --  29   BUN  --   --  24.5*  --  22.9  --  16.7   CR  --   --  0.90  --  0.93  --  0.86   ANIONGAP  --   --  13  --  8  --  11   EBONI  --   --  9.1  --  9.0  --  9.2   * 146* 132*   < > 153*   < > 137*    < > = values in this interval not displayed.     Most Recent 3 INR's:  Recent Labs   Lab Test 07/23/24  0623 07/22/24  1747 07/22/24  0711   INR 2.47* 2.94* 3.40*   ,   Results for orders placed or performed during the hospital encounter of 07/19/24   MR Brain w/o & w Contrast    Narrative    EXAM: MR BRAIN W/O and W CONTRAST  LOCATION: [facility]  DATE: 7/19/2024    INDICATION: increased confusion with vision issues  COMPARISON: 6/25/23  CONTRAST: 6ml gadavist  TECHNIQUE: Routine multiplanar multisequence head MRI without and with intravenous contrast.    FINDINGS:  INTRACRANIAL CONTENTS:     Small ovoid region of restricted diffusion  in the left occipital lobe. More remote ischemic changes noted adjacent to the left occipial lobe. Patchy and confluent nonspecific T2/FLAIR hyperintensities within the cerebral white matter most consistent with   moderate chronic microvascular ischemic change. Moderate generalized cerebral atrophy. No hydrocephalus. Normal position of the cerebellar tonsils. No pathologic contrast enhancement.    SELLA: No abnormality accounting for technique.    OSSEOUS STRUCTURES/SOFT TISSUES: Normal marrow signal. The major intracranial vascular flow voids are maintained.     ORBITS: No abnormality accounting for technique.     SINUSES/MASTOIDS: No paranasal sinus mucosal disease. No middle ear or mastoid effusion.       Impression    IMPRESSION:  1.  Acute-late acute ischemic changes in the left occipital lobe immediately adjacent to a region of remote ischemic changes in the left occipital lobe.  2.  No abnormal enhancement.   Chest XR,  PA & LAT    Narrative    EXAM: XR CHEST 2 VIEWS  LOCATION: [facility]  DATE: 7/19/2024    INDICATION: increased confusion. Looking for infection  COMPARISON: 6/25/2023      Impression    IMPRESSION: Marked elevation the right hemidiaphragm again noted. Lungs are clear. No signs of pneumonia or failure. Heart is enlarged but unchanged. A few, coarse calcifications are again noted in the soft tissues of the right axilla.   CTA Head Neck with Contrast    Narrative    EXAM: CTA HEAD NECK W CONTRAST  LOCATION: [facility]  DATE: 7/19/2024    INDICATION: vision changes and general weakness  COMPARISON: 07/19/2024 brain MRI. 06/25/2023 head CT.  CONTRAST: isovue 370 75ml  TECHNIQUE: Head and neck CT angiogram with IV contrast. Noncontrast head CT followed by axial helical CT images of the head and neck vessels obtained during the arterial phase of intravenous contrast administration. Axial 2D reconstructed images and   multiplanar 3D MIP reconstructed  images of the head and neck vessels were performed by the technologist. Dose reduction techniques were used. All stenosis measurements made according to NASCET criteria unless otherwise specified.    FINDINGS:   NONCONTRAST HEAD CT:   INTRACRANIAL CONTENTS: No intracranial hemorrhage, extraaxial collection, or mass effect.  Left occipital lobe evolving infarct redemonstrated but evaluated to better effect on recent brain MRI. Mild presumed chronic small vessel ischemic changes.   Chronic lacunar infarct in the right caudate and right lentiform nucleus. Moderate generalized volume loss. No hydrocephalus.     VISUALIZED ORBITS/SINUSES/MASTOIDS: Prior bilateral cataract surgery. Visualized portions of the orbits are otherwise unremarkable. No paranasal sinus mucosal disease. No middle ear or mastoid effusion.    BONES/SOFT TISSUES: Hyperostosis frontalis interna, an age-related incidental finding.    HEAD CTA:  Calcification of the distal internal carotid arteries bilaterally with mild narrowing. Bilateral MCA and bilateral GARETT are patent. Dominant right vertebral artery. Streak and beam hardening artifact from dental restorations obscures evaluation of the   left V3 and bilateral V4 segments. Severe stenosis versus occlusion of the distal left V4 segment. Mild narrowings in the basilar artery. Fetal origin of the left PCA. Bilateral PCA are patent. No aneurysm and no high flow vascular malformation.    NECK CTA:  RIGHT CAROTID: Mild narrowing at the right carotid bifurcation.    LEFT CAROTID: Mild narrowing at the left carotid bifurcation and proximal left internal carotid artery.    VERTEBRAL ARTERIES: No focal stenosis or dissection. Dominant right vertebral artery with a diminutive nondominant left vertebral artery.    AORTIC ARCH: Classic aortic arch anatomy with no significant stenosis at the origin of the great vessels.    NONVASCULAR STRUCTURES: Subcentimeter hypoattenuating nodule in the left thyroid lobe. At  this size, follow-up is not needed. Osteopenia with degenerative change in the cervical spine.      Impression    IMPRESSION:   HEAD CT:  1.  Known evolving infarct in the left occipital lobe is evaluated to better effect on recent brain MRI. No intracranial hemorrhage.    2.  Background age-related changes and chronic ischemic changes as above.    HEAD CTA:   1.  Dental artifact obscures evaluation of the left V3 and proximal left V4 segment. Absent contrast opacification in the distal left V4 segment of the nondominant left vertebral artery suggesting severe stenosis versus short segment occlusion.    2.  Additional mild narrowings in the basilar artery.    3.  No aneurysm and no high flow vascular malformation.    NECK CTA:  1.  Mild narrowing at the bilateral carotid bifurcation and mild narrowing in the proximal left internal carotid artery.   CT Head w/o Contrast    Narrative    EXAM: CT HEAD W/O CONTRAST  LOCATION: [facility]  DATE: 2024    INDICATION: Repeat CT to look for hemorrhagic conversion.  COMPARISON: Brain MR 2024.  TECHNIQUE: Routine CT Head without IV contrast. Multiplanar reformats. Dose reduction techniques were used.      Impression    IMPRESSION: Limited study due to motion. Evolving subacute ischemic infarct in the left occipital lobe again noted. No other infarcts noted. No evidence for intracranial hemorrhage. Mild diffuse cerebral volume loss. Mild age-related cerebral white   matter changes. No midline shift. No hydrocephalus. No fractures. No sinusitis. No mastoiditis.   Echocardiogram Complete     Value    LVEF  60-65%    Narrative    [id number]  [id number]  [id number]  [id number]^KRISSY^ELVIE^[place] [address]     Name: ABIEL PENN  MRN: [id number]  : 1932  Study Date: 2024 01:19 PM  Age: 89 yrs  Gender: Female  Patient Location: [facility]  Reason For Study: Cerebrovascular  Incident  Ordering Physician: ELVIE REY  Performed By: LUZ     BSA: 1.6 m2  Height: 64 in  Weight: 130 lb  HR: 52  BP: 145/70 mmHg  ______________________________________________________________________________  Procedure  Complete Portable Echo Adult.  ______________________________________________________________________________  Interpretation Summary     Left ventricular function is normal.The ejection fraction is 60-65%.  There is moderate concentric left ventricular hypertrophy.  Normal right ventricle size and systolic function.  The left atrium is severely dilated.  The right atrium is severely dilated.  There is mild (1+) mitral regurgitation.  There is moderate (2+) tricuspid regurgitation.  Moderate valvular aortic stenosis.  Right ventricular systolic pressure is elevated, consistent with mild  pulmonary hypertension.  ______________________________________________________________________________  Left Ventricle  Left ventricular function is normal.The ejection fraction is 60-65%. There is  moderate concentric left ventricular hypertrophy. Diastolic function not  assessed due to atrial fibrillation. No regional wall motion abnormalities  noted.     Right Ventricle  Normal right ventricle size and systolic function. TAPSE is abnormal, which is  consistent with abnormal right ventricular systolic function.     Atria  The left atrium is severely dilated. The right atrium is severely dilated.     Mitral Valve  Mitral valve leaflets appear normal. There is no evidence of mitral stenosis  or clinically significant mitral regurgitation. There is moderate mitral  annular calcification. There is mild (1+) mitral regurgitation.     Tricuspid Valve  The tricuspid valve is not well visualized, but is grossly normal. There is  moderate (2+) tricuspid regurgitation. The right ventricular systolic pressure  is approximated at 31.6 mmHg plus the right atrial pressure. Right ventricular  systolic pressure is  elevated, consistent with mild pulmonary hypertension.     Aortic Valve  The aortic valve is trileaflet. Moderate valvular aortic stenosis. The mean  AoV pressure gradient is 19.0 mmHg.     Pulmonic Valve  The pulmonic valve is not well seen, but is grossly normal. There is trace to  mild pulmonic valvular regurgitation.     Vessels  The aorta root is normal. Normal size ascending aorta. IVC diameter and  respiratory changes fall into an intermediate range suggesting an RA pressure  of 8 mmHg.     Pericardium  There is no pericardial effusion.     Rhythm  The rhythm was atrial fibrillation.  ______________________________________________________________________________  MMode/2D Measurements & Calculations     IVSd: 1.8 cm  LVIDd: 3.7 cm  LVIDs: 2.6 cm  LVPWd: 1.7 cm  FS: 30.9 %  LV mass(C)d: 270.6 grams  LV mass(C)dI: 166.2 grams/m2  Ao root diam: 3.1 cm  LVOT diam: 2.0 cm  LVOT area: 3.1 cm2  Ao root diam index Ht(cm/m): 1.9  Ao root diam index BSA (cm/m2): 1.9  EF Biplane: 57.5 %  LA Volume (BP): 160.0 ml     LA Volume Index (BP): 98.2 ml/m2  LA Volume Indexed (AL/bp): 103.7 ml/m2  RV Base: 4.5 cm  RWT: 0.92  TAPSE: 1.5 cm     Doppler Measurements & Calculations  MV E max jeremiah: 151.0 cm/sec  MV A max jeremiah: 41.3 cm/sec  MV E/A: 3.7  MV max P.4 mmHg  MV mean P.0 mmHg  MV V2 VTI: 35.8 cm  MVA(VTI): 1.9 cm2     MV dec time: 0.22 sec  Ao V2 max: 304.8 cm/sec  Ao max P.0 mmHg  Ao V2 mean: 194.0 cm/sec  Ao mean P.0 mmHg  Ao V2 VTI: 55.4 cm  GLORIA(I,D): 1.3 cm2  GLORIA(V,D): 1.3 cm2  LV V1 max P.5 mmHg  LV V1 max: 127.3 cm/sec  LV V1 VTI: 22.2 cm  SV(LVOT): 69.7 ml  SI(LVOT): 42.8 ml/m2  PA acc time: 0.10 sec  PI end-d jeremiah: 113.0 cm/sec  TR max jeremiah: 281.0 cm/sec  TR max P.6 mmHg  AV Jeremiah Ratio (DI): 0.42  GLORIA Index (cm2/m2): 0.77  E/E': 18.4  E/E' av.6  Lateral E/e': 18.5  Medial E/e': 18.8  Peak E' Jeremiah: 8.2 cm/sec  RV S Jeremiah: 11.5 cm/sec      ______________________________________________________________________________  Report approved by: Alvin Snow 07/20/2024 05:11 PM             Discharge Medications   Current Discharge Medication List        START taking these medications    Details   apixaban ANTICOAGULANT (ELIQUIS) 2.5 MG tablet Take 1 tablet (2.5 mg) by mouth 2 times daily for 30 days  Qty: 60 tablet, Refills: 0    Comments: Daily INR, Start when INR <2  Associated Diagnoses: Occipital infarction (H)      atorvastatin (LIPITOR) 40 MG tablet Take 1 tablet (40 mg) by mouth daily for 30 days  Qty: 30 tablet, Refills: 0    Associated Diagnoses: Occipital infarction (H)           CONTINUE these medications which have NOT CHANGED    Details   chlorthalidone (HYGROTON) 25 MG tablet Take 12.5 mg by mouth daily      digoxin (LANOXIN) 0.25 MG tablet Take 1 tablet by mouth daily      !! levothyroxine (SYNTHROID/LEVOTHROID) 25 MCG tablet Take 25 mcg by mouth Every Mon, Tues, Wed, Thur and Fri Morning      !! levothyroxine (SYNTHROID/LEVOTHROID) 50 MCG tablet Take 50 mcg by mouth See Admin Instructions SATURDAY, SUNDAY ONLY      lisinopril (ZESTRIL) 40 MG tablet Take 40 mg by mouth daily       !! - Potential duplicate medications found. Please discuss with provider.        STOP taking these medications       warfarin ANTICOAGULANT (COUMADIN) 5 MG tablet Comments:   Reason for Stopping:         warfarin ANTICOAGULANT (COUMADIN) 7.5 MG tablet Comments:   Reason for Stopping:             Allergies   Allergies   Allergen Reactions    Neomycin Unknown    Nickel Unknown    No Clinical Screening - See Comments      black rubber mix

## 2024-07-23 NOTE — PROGRESS NOTES
Care Management Discharge Note    Discharge Date: 07/23/2024       Discharge Disposition: Assisted Living, Home Care (memory care)    Discharge Services: None    Discharge DME: None    Discharge Transportation: health plan transportation    Private pay costs discussed: transportation costs    Does the patient's insurance plan have a 3 day qualifying hospital stay waiver?  No    PAS Confirmation Code: NA  Patient/family educated on Medicare website which has current facility and service quality ratings: no    Education Provided on the Discharge Plan: Yes  Persons Notified of Discharge Plans: Patient's son Alton  Patient/Family in Agreement with the Plan: yes    Handoff Referral Completed: Yes    Additional Information:  VIV spoke with ROGELIO Reid ([phone]) at [facility] who reports they are able to accommodate Pt's needs and can accept her back today. VIV spoke with Pt's son Alton outside of Pt's room to discuss discharge plan. Alton reports concern for how Pt is doing and that he does not believe Pt is ready to discharge.     VIV was notified by bedside nurse that Alton is requesting a palliative care consult and is unsure about next steps.     11:29 AM  VIV updated by Alton that he is no longer wanting palliative care consult and is okay with Pt discharging. He notes being confused regarding Pt's medical condition but is comfortable with discharge plan. Pt to discharge to [facility] via [facility] between 14:08-14:53.    KERI Monahan

## 2024-07-23 NOTE — PLAN OF CARE
Patient forgetful and sets off bed alarm to go to the bathroom. Intermittently confused and redirectable to place, time, and situation. Right field vision cut.     Problem: Adult Inpatient Plan of Care  Goal: Plan of Care Review  Description: The Plan of Care Review/Shift note should be completed every shift.  The Outcome Evaluation is a brief statement about your assessment that the patient is improving, declining, or no change.  This information will be displayed automatically on your shift  note.  Outcome: Progressing     Problem: Stroke, Ischemic (Includes Transient Ischemic Attack)  Goal: Optimal Cognitive Function  Outcome: Progressing     Problem: Fall Injury Risk  Goal: Absence of Fall and Fall-Related Injury  Outcome: Progressing     Problem: Stroke, Ischemic (Includes Transient Ischemic Attack)  Goal: Safe and Effective Swallow  Outcome: Progressing

## 2024-07-23 NOTE — PLAN OF CARE
Goal Outcome Evaluation:       Patient transferred to [facility] via [facility] transport.

## 2024-07-23 NOTE — PLAN OF CARE
Occupational Therapy Discharge Summary    Reason for therapy discharge:    Discharged to memory care facility    Progress towards therapy goal(s). See goals on Care Plan in [facility] electronic health record for goal details.  Goals partially met.  Barriers to achieving goals:   discharge from facility.    Therapy recommendation(s):    No further therapy is recommended.

## 2024-07-24 NOTE — PROGRESS NOTES
Connected Care Resource Center    Background: Transitional Care Management program identified per system criteria and reviewed by [facility] team for possible outreach.    Assessment: Upon chart review, [facility] Team member will not proceed with patient outreach related to this episode of Transitional Care Management program due to reason below:    Patient has discharged to a Memory Care, Long-term Care, Assisted Living or Group Home where patient is receiving on-site support with their daily cares, including support with hospital follow up plan.    Plan: Transitional Care Management episode addressed appropriately per reason noted above.      Jocelyne Singer MA  Connected Care Resource [facility]    *Connected Care Resource Team does NOT follow patient ongoing. Referrals are identified based on internal discharge reports and the outreach is to ensure patient has an understanding of their discharge instructions.

## 2024-08-06 NOTE — ED TRIAGE NOTES
"Patient arrives via [facility] from [facility].  It is reported that SNF staff \"heard\" the patient fall.  When staff arrived they found patient laying on the floor.  Patient takes blood thinners.  Patient reports the back of her head hurts.  Routinely uses walker.  EMS blood sugar 165.  Easy respirations.  Skin warm, dry, intact.        Triage Assessment (Adult)       Row Name 08/06/24 0514          Triage Assessment    Airway WDL WDL        Respiratory WDL    Respiratory WDL WDL        Peripheral/Neurovascular WDL    Peripheral Neurovascular WDL WDL                     "

## 2024-08-06 NOTE — ED NOTES
Patient's son has arrived.  He reports patient had a stroke about 3 weeks ago, and has experienced visual problems since then.

## 2024-08-06 NOTE — ED PROVIDER NOTES
EMERGENCY DEPARTMENT ENCOUNTER      NAME: Alicia Coates  AGE: 89 year old female  YOB: 1932  MRN: [id number]  EVALUATION DATE & TIME: No admission date for patient encounter.    PCP: Kera Rios    ED PROVIDER: Hermes Barbosa M.D.      Chief Complaint   Patient presents with    Fall         FINAL IMPRESSION:  1. Fall, initial encounter    2. Closed head injury, initial encounter        ED COURSE & MEDICAL DECISION MAKIN year old female presents to the Emergency Department for evaluation of fall with head injury.  Patient anticoagulated on Eliquis for chronic atrial fibrillation.  She fell at her memory care facility.  Circumstances somewhat unclear but staff were able to hear her fall without any apparent loss of consciousness.  Greatest suspicion for probable mechanical fall.  EKG demonstrates chronic rate controlled atrial fibrillation.  Lab evaluations reveal some mild hypokalemia which was replaced.  Trauma imaging of the head and cervical spine was obtained which was negative.  Did inform family about the pulmonary nodule for which follow-up was considered, they think this is unlikely to be fruitful at her age and with her comorbidities.  We discussed monitoring plan for home and they are comfortable with returning her to memory care facility.  Patient was discharged in stable condition.    At the conclusion of the encounter I discussed the results of all of the tests and the disposition. The questions were answered. The patient or family acknowledged understanding and was agreeable with the care plan.       Medical Decision Making  Obtained supplemental history:Supplemental history obtained?: No  Reviewed external records: External records reviewed?: No  Care impacted by chronic illness:Anticoagulated State, Dementia, and Heart Disease  Care significantly affected by social determinants of health:N/A  Did you consider but not order tests?: Work up considered but not performed  and documented in chart, if applicable  Did you interpret images independently?: Independent interpretation of ECG and images noted in documentation, when applicable.  Consultation discussion with other provider:Did you involve another provider (consultant, MH, pharmacy, etc.)?: No  Discharge. No recommendations on prescription strength medication(s). N/A.        MEDICATIONS GIVEN IN THE EMERGENCY:  Medications   potassium chloride montez ER (KLOR-CON M20) CR tablet 40 mEq (40 mEq Oral Not Given 8/6/24 0539)   potassium chloride (KLOR-CON) Packet 40 mEq (40 mEq Oral $Given 8/6/24 0547)       NEW PRESCRIPTIONS STARTED AT TODAY'S ER VISIT  New Prescriptions    No medications on file          =================================================================    HPI    Patient information was obtained from: EMS, Patient, Family      Alicia Coates is a 89 year old female with a pertinent history of atrial fibrillation who presents to this ED today for evaluation of fall.  Patient resides in a memory care unit.  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  She sustained a fall at her residence.  Staff heard her fall but did not witness it.  She was found in the hallway next to her wheelchair.  She has been using a wheelchair versus walker to get around recently.  Unclear if she was riding in the wheelchair was trying to walk and pushing it.  She was responsive immediately.  Staff thinks she may have hit her head.  She does not have any other pain complaints at this time but history is limited due to dementia.      REVIEW OF SYSTEMS   ROS limited due to dementia.    PAST MEDICAL HISTORY:  Past Medical History:   Diagnosis Date    Chronic atrial fibrillation (H)     CKD (chronic kidney disease)     Stage 3a    History of skin cancer     HLD (hyperlipidemia)     HTN (hypertension)     Hypothyroidism     T2DM (type 2 diabetes mellitus) (H)        PAST SURGICAL HISTORY:  Past  "Surgical History:   Procedure Laterality Date    BUNIONECTOMY      MOHS MICROGRAPHIC PROCEDURE      NO HISTORY OF SURGERY  06/11/2013    derm           CURRENT MEDICATIONS:    No current facility-administered medications for this encounter.     Current Outpatient Medications   Medication Sig Dispense Refill    apixaban ANTICOAGULANT (ELIQUIS) 2.5 MG tablet Take 1 tablet (2.5 mg) by mouth 2 times daily 60 tablet 0    atorvastatin (LIPITOR) 40 MG tablet Take 1 tablet (40 mg) by mouth daily 30 tablet 0    chlorthalidone (HYGROTON) 25 MG tablet Take 12.5 mg by mouth daily      digoxin (LANOXIN) 0.25 MG tablet Take 1 tablet by mouth daily      levothyroxine (SYNTHROID/LEVOTHROID) 25 MCG tablet Take 25 mcg by mouth Every Mon, Tues, Wed, Thur and Fri Morning      levothyroxine (SYNTHROID/LEVOTHROID) 50 MCG tablet Take 50 mcg by mouth See Admin Instructions SATURDAY, SUNDAY ONLY      lisinopril (ZESTRIL) 40 MG tablet Take 40 mg by mouth daily           ALLERGIES:  Allergies   Allergen Reactions    Neomycin Unknown    Nickel Unknown    No Clinical Screening - See Comments      black rubber mix        FAMILY HISTORY:  No family history on file.    SOCIAL HISTORY:   Social History     Socioeconomic History    Marital status:    Tobacco Use    Smoking status: Never    Smokeless tobacco: Never   Social History Narrative    Moved into [facility] in May 2023. Was living independently prior. Primary decision makers/helpers are her son Scott and daughter Cristina.      Social Determinants of Health      Received from [employer], [employer] [facility] [employer] [facility]    Financial Resource Strain    [facility] [employer] [facility] [employer] [facility]    Social Connections       VITALS:  BP (!) 140/63   Pulse 62   Temp 97.6  F (36.4  C) (Oral)   Resp 26   Ht 1.651 m (5' 5\")   Wt 59 kg (130 lb)   SpO2 93%   BMI 21.63 " kg/m      PHYSICAL EXAM    Constitutional: Elderly female patient, laying in bed, no acute distress  HENT: Normocephalic, no visible external head trauma.  No palpable skull step-off or deformity.  No midline cervical spine tenderness.  Eyes: EOMI, Conjunctiva normal.  Respiratory: Breathing comfortably on room air. Speaks full sentences easily. Lungs clear to ascultation.  Cardiovascular: Normal heart rate, Regular rhythm. No peripheral edema.  Abdomen: Soft, nontender  Musculoskeletal: Good range of motion in all major joints. No major deformities noted.  Integument: Warm, Dry.  Neurologic: Alert & awake, Normal motor function, Normal sensory function, No focal deficits noted.   Psychiatric: Cooperative. Affect appropriate.     LAB:  All pertinent labs reviewed and interpreted.  Labs Ordered and Resulted from Time of ED Arrival to Time of ED Departure   BASIC METABOLIC PANEL - Abnormal       Result Value    Sodium 141      Potassium 3.1 (*)     Chloride 101      Carbon Dioxide (CO2) 30 (*)     Anion Gap 10      Urea Nitrogen 23.4 (*)     Creatinine 0.97 (*)     GFR Estimate 55 (*)     Calcium 8.6 (*)     Glucose 124 (*)    CBC WITH PLATELETS AND DIFFERENTIAL    WBC Count 9.9      RBC Count 4.67      Hemoglobin 14.1      Hematocrit 41.0      MCV 88      MCH 30.2      MCHC 34.4      RDW 13.0      Platelet Count 243      % Neutrophils 70      % Lymphocytes 19      % Monocytes 8      % Eosinophils 1      % Basophils 1      % Immature Granulocytes 0      NRBCs per 100 WBC 0      Absolute Neutrophils 6.9      Absolute Lymphocytes 1.9      Absolute Monocytes 0.8      Absolute Eosinophils 0.1      Absolute Basophils 0.1      Absolute Immature Granulocytes 0.0      Absolute NRBCs 0.0         RADIOLOGY:  Reviewed all pertinent imaging. Please see official radiology report.  CT Cervical Spine w/o Contrast   Preliminary Result   IMPRESSION:   HEAD CT:   1.  No acute intracranial abnormality.      2.  Evolving left posterior  circulation infarct.      3.  Age-related and chronic ischemic changes.      CERVICAL SPINE CT:   1.  No acute cervical spine fracture.      2.  6 mm posterior right upper lobe nodule may be new since the prior study. Consider dedicated chest imaging.                        Head CT w/o contrast   Preliminary Result   IMPRESSION:   HEAD CT:   1.  No acute intracranial abnormality.      2.  Evolving left posterior circulation infarct.      3.  Age-related and chronic ischemic changes.      CERVICAL SPINE CT:   1.  No acute cervical spine fracture.      2.  6 mm posterior right upper lobe nodule may be new since the prior study. Consider dedicated chest imaging.                            EKG:    Performed at: 516 AM    Impression: Atrial fibrillation, incomplete RBBB, previously cited septal infarct pattern    Rate: 63  Rhythm: Afib  Axis: Normal  MD Interval: NA  QRS Interval: 106  QTc Interval: 329  ST Changes: nonspecific ST abnormality  Comparison: Compared to July 19, 2024, improvement in anterior lateral T wave inversions    I have independently reviewed and interpreted the EKG(s) documented above.        Hermes Barbosa M.D.  Emergency Medicine  [facility] EMERGENCY DEPARTMENT  [facility] [facility] [id number] [phone]  Dept: [phone]       Hermes Barbosa MD  08/06/24 0557

## 2024-08-06 NOTE — DISCHARGE INSTRUCTIONS
Alicia was seen in the emergency department for fall with probable head injury.  Her evaluation included CT imaging for trauma which was negative for major injury.  Please continue to monitor things closely at home.  If she does experience severe worsening confusion, lethargy, or other immediate concern we can reevaluate anytime in the emergency department.

## 2024-08-06 NOTE — ED NOTES
Bed: JNEDH-I  Expected date: 8/6/24  Expected time: 4:53 AM  Means of arrival: Ambulance  Comments:  [place]  89 yo F fall/ On thinners

## 2024-08-11 PROBLEM — Z79.01 ANTICOAGULATED BY ANTICOAGULATION TREATMENT: Status: ACTIVE | Noted: 2024-01-01

## 2024-08-11 PROBLEM — R19.7 DIARRHEA, UNSPECIFIED TYPE: Status: ACTIVE | Noted: 2024-01-01

## 2024-08-11 PROBLEM — R78.89 ELEVATED DIGOXIN LEVEL: Status: ACTIVE | Noted: 2024-01-01

## 2024-08-11 PROBLEM — I48.20 CHRONIC A-FIB (H): Status: ACTIVE | Noted: 2024-01-01

## 2024-08-11 PROBLEM — N17.9 AKI (ACUTE KIDNEY INJURY) (H): Status: ACTIVE | Noted: 2024-01-01

## 2024-08-11 NOTE — MEDICATION SCRIBE - ADMISSION MEDICATION HISTORY
Medication Scribe Admission Medication History    Admission medication history is complete. The information provided in this note is only as accurate as the sources available at the time of the update.    Information Source(s): Facility (U/NH/) medication list/MAR via N/A    Pertinent Information: patient's medications are being managed by [facility] [phone]    Received MAR dated 1/21/24. Was unable to get a MAR from today despite multiple attempts to procure.     Warfarin: MAR had patient taking. Eliquis was filled on 7/23/24 and refilled on 8/4/24. Kept Eliquis on PTA list.      Changes made to PTA medication list:  Added: None  Deleted: None  Changed: None    Allergies reviewed with patient and updates made in EHR: yes    Medication History Completed By: Pelon Rooney 8/11/2024 5:40 PM    PTA Med List   Medication Sig Last Dose    apixaban ANTICOAGULANT (ELIQUIS) 2.5 MG tablet Take 1 tablet (2.5 mg) by mouth 2 times daily 8/11/2024 at am    atorvastatin (LIPITOR) 40 MG tablet Take 1 tablet (40 mg) by mouth daily 8/11/2024 at am    chlorthalidone (HYGROTON) 25 MG tablet Take 12.5 mg by mouth daily 8/11/2024 at am    digoxin (LANOXIN) 0.25 MG tablet Take 1 tablet by mouth every morning 8/11/2024 at am    levothyroxine (SYNTHROID/LEVOTHROID) 25 MCG tablet Take 25 mcg by mouth five times a week Monday through Friday 8/10/2024 at am    levothyroxine (SYNTHROID/LEVOTHROID) 50 MCG tablet Take 50 mcg by mouth twice a week Saturday and Sunday 8/11/2024 at am    lisinopril (ZESTRIL) 40 MG tablet Take 40 mg by mouth every morning 8/11/2024 at am

## 2024-08-11 NOTE — ED NOTES
Poison Control called, they state that patient's Potassium and Digoxin levels should be monitored q4hrs. Patient should also should have continuous fluids running. If patient's cardiac rhythm changes and becomes more bradycardic, poison control should be notified immediately and reversal medication should be given. MD notified.

## 2024-08-11 NOTE — ED NOTES
Called facility and notified Tanesha that patient was being admitted. Called daughter and left voicemail as to room change.

## 2024-08-11 NOTE — ED TRIAGE NOTES
Patient from [facility]. EMS was called due to patient having nausea, vomiting and diarrhea for past 3 days. According to the facility patient was hypotensive. Upon arrival patient VSS. Blood pressure within normal limits. Staff also stated that patient seems more lethargic than baseline.     Triage Assessment (Adult)       Row Name 08/11/24 1429          Triage Assessment    Airway WDL WDL        Respiratory WDL    Respiratory WDL WDL        Skin Circulation/Temperature WDL    Skin Circulation/Temperature WDL WDL        Cardiac WDL    Cardiac WDL WDL        Peripheral/Neurovascular WDL    Peripheral Neurovascular WDL WDL        Cognitive/Neuro/Behavioral WDL    Cognitive/Neuro/Behavioral WDL X;orientation     Level of Consciousness alert     Arousal Level opens eyes spontaneously     Orientation disoriented to;time;situation     Speech clear;logical     Mood/Behavior flat affect        Pupils (CN II)    Pupil PERRLA yes     Pupil Size Left 2 mm     Pupil Size Right 2 mm        [place] Coma Scale    Best Eye Response 4-->(E4) spontaneous     Best Motor Response 6-->(M6) obeys commands     Best Verbal Response 5-->(V5) oriented     [place] Coma Scale Score 15

## 2024-08-11 NOTE — ED PROVIDER NOTES
EMERGENCY DEPARTMENT ENCOUNTER      NAME: Alicia Coates  AGE: 89 year old female  YOB: 1932  MRN: [id number]  EVALUATION DATE & TIME: 8/11/2024  2:23 PM    PCP: Kera Rios    ED PROVIDER: Rhona Montiel M.D.      CHIEF COMPLAINT     Chief Complaint   Patient presents with    Nausea, Vomiting, & Diarrhea         FINAL IMPRESSION:     1. Elevated digoxin level    2. Diarrhea, unspecified type    3. SUNSHINE (acute kidney injury) (H24)    4. Chronic a-fib (H)    5. Anticoagulated by anticoagulation treatment          MEDICAL DECISION MAKING:   POISON  CONTROL RECOMMENDATION  Poison Control called, they state that patient's Potassium and Digoxin levels should be monitored q4hrs. Patient should also should have continuous fluids running. If patient's cardiac rhythm changes and becomes more bradycardic, poison control should be notified immediately and reversal medication should be given.         Medical Decision Making    History:  Supplemental history from: Family Member/Significant Other, EMS, and Other: [facility] Staff  External Record(s) reviewed: Documented in chart    Work Up:  Chart documentation includes differential considered and any EKGs or imaging independently interpreted by provider, where specified.  In additional to work up documented, I considered the following work up: Documented in chart, if applicable.    External consultation:  Discussion of management with another provider: Hospitalist    Complicating factors:  Care impacted by chronic illness: Anticoagulated State, Chronic Kidney Disease, Dementia, Diabetes, Hyperlipidemia, and Hypertension  Care affected by social determinants of health: N/A    Disposition considerations: Admit.      Review of External Records  Hospital/Clinic:   Date: 6/26/2024  On warfarin for chronic atrial fibrillation.  Patient is DNR/DNI.  CKD, hyperlipidemia, hypertension, and type 2 diabetes mellitus.    External Consultation  Phalen, Dr.  John Paul  Poison Control   [facility]    ED COURSE   2:38 PM I met with the patient and perform initial physical exam.  We discussed the plan going forward.  I am going to call [facility] in order to obtain her information.  Will discuss with daughter when she gets here.  2:47 PM I spoke with staff at [facility].  Obtained further history.  Reports patient has not been eating as much lately.  States having 2 low pressures today.  Denies any falls since her recent one.   3:30 PM daughter is now at bedside.  I spoke with her about more of the history.  She states that they were called earlier this morning due to patient having diarrhea.  Staff at [facility] also state that patient was lethargic.  States that at baseline patient does not eat much since having a stroke.  Denies any known recent falls.  Mentions that here in the emergency department patient looks tired, weak and pale.  Daughter mentions that patient normally uses a walker to get around.  She states that when patient had her last CVA patient woke up more confused than normal.  4:59 PM I spoke with poison control regarding the patient and her medications.   5:36 PM I spoke with Dr. Coker, resident, PV for admission. Recommended med/surg tele.     At the conclusion of the encounter I discussed the results of all of the tests and the disposition. The questions were answered. The patient and daughter acknowledged understanding and was agreeable with the care plan.     MEDICATIONS GIVEN IN THE EMERGENCY:     Medications   sodium chloride 0.9 % infusion (has no administration in time range)   sodium chloride 0.9% BOLUS 500 mL (0 mLs Intravenous Stopped 8/11/24 1526)   sodium chloride 0.9% BOLUS 250 mL (250 mLs Intravenous $New Bag 8/11/24 1736)       NEW PRESCRIPTIONS STARTED AT TODAY'S ER VISIT     New Prescriptions    No medications on file          =================================================================    HPI     Patient information was  obtained from: Patient, RN/EMS report    Use of : N/A        Alicia Coates is a 89 year old female who presents by EMS for evaluation of nausea, vomiting, diarrhea.    Per RN/EMS report, patient is coming from [facility] [place]  Staff there report patient has been having vomiting and diarrhea for the past 3 days.  Staff also mentioned that patient's blood pressures have been soft in the 80s, however once EMS picked patient up they had pressures in the 120s.    Per patient, her neck is uncomfortable.  Otherwise, she is unsure why she is here.  She denies any recent falls.  Denies any recent abdominal pain, trouble breathing, or cough.    Otherwise in normal state of health. No further concerns at this time.     REVIEW OF SYSTEMS   Review of Systems   Respiratory:  Negative for cough and shortness of breath.    Gastrointestinal:  Positive for diarrhea, nausea and vomiting. Negative for abdominal pain.   All other systems reviewed and are negative.       PAST MEDICAL HISTORY:     Past Medical History:   Diagnosis Date    Chronic atrial fibrillation (H)     CKD (chronic kidney disease)     Stage 3a    History of skin cancer     HLD (hyperlipidemia)     HTN (hypertension)     Hypothyroidism     T2DM (type 2 diabetes mellitus) (H)        PAST SURGICAL HISTORY:     Past Surgical History:   Procedure Laterality Date    BUNIONECTOMY      MOHS MICROGRAPHIC PROCEDURE      NO HISTORY OF SURGERY  06/11/2013    derm         CURRENT MEDICATIONS:   apixaban ANTICOAGULANT (ELIQUIS) 2.5 MG tablet  atorvastatin (LIPITOR) 40 MG tablet  chlorthalidone (HYGROTON) 25 MG tablet  digoxin (LANOXIN) 0.25 MG tablet  levothyroxine (SYNTHROID/LEVOTHROID) 25 MCG tablet  levothyroxine (SYNTHROID/LEVOTHROID) 50 MCG tablet  lisinopril (ZESTRIL) 40 MG tablet         ALLERGIES:     No Known Allergies      FAMILY HISTORY:   No family history on file.    SOCIAL HISTORY:     Social History     Socioeconomic History    Marital  status:    Tobacco Use    Smoking status: Never    Smokeless tobacco: Never   Social History Narrative    Moved into [facility] in May 2023. Was living independently prior. Primary decision makers/helpers are her son Scott and daughter Cristina.      Social Determinants of Health      [facility]    Financial Resource Strain    Received from [facility]    Social Connections       VITALS:   /56   Pulse 54   Temp 97.6  F (36.4  C) (Oral)   Resp 17   Wt 59 kg (130 lb)   SpO2 96%   BMI 21.63 kg/m      PHYSICAL EXAM     Physical Exam  Vitals and nursing note reviewed. Exam conducted with a chaperone present.   Constitutional:       Appearance: She is ill-appearing.      Comments: Nontoxic pale looks like she does not feel well.   Skin:     Coloration: Skin is pale.   Neurological:      Mental Status: She is alert. Mental status is at baseline.         Physical Exam   Constitutional: Elderly appearing, nontoxic.    Head: Atraumatic.     Nose: Nose normal.     Mouth/Throat: Oropharynx is clear.  Mucous membranes dry.    Eyes: EOM are normal. Pupils are equal, round, and reactive to light.     Ears: Bilateral pearly white tympanic membranes.    Neck: Normal range of motion. Neck supple.     Cardiovascular: Normal rate, irregular rhythm and normal heart sounds.  2+ femoral pulses/radial/DP pulses B.  2/6 systolic murmur.  Lower extremities without edema.    Pulmonary/Chest: Normal effort  and breath sounds normal.     Abdominal: soft nontender.    Musculoskeletal: Normal range of motion.     Neurological: Moves upper and lower extremities equally.  Oriented to self.    Lymphatics: no edema, no calves pain, no palpable cords.    : NA    Skin: Skin is warm and dry. pale    Psychiatric: Normal mood and affect. Behavior is  normal.       LAB:     All pertinent labs reviewed and interpreted.  Labs Ordered and Resulted from Time of ED Arrival to Time of ED Departure   BASIC METABOLIC PANEL - Abnormal       Result Value    Sodium 141      Potassium 3.6      Chloride 98      Carbon Dioxide (CO2) 29      Anion Gap 14      Urea Nitrogen 28.4 (*)     Creatinine 1.27 (*)     GFR Estimate 40 (*)     Calcium 9.0      Glucose 130 (*)    HEPATIC FUNCTION PANEL - Abnormal    Protein Total 6.7      Albumin 3.6      Bilirubin Total 1.1      Alkaline Phosphatase 76      AST 36      ALT 20      Bilirubin Direct 0.35 (*)    DIGOXIN LEVEL - Abnormal    Digoxin 3.1 (*)    INR - Abnormal    INR 1.80 (*)    CBC WITH PLATELETS AND DIFFERENTIAL - Abnormal    WBC Count 10.1      RBC Count 4.92      Hemoglobin 14.7      Hematocrit 43.7      MCV 89      MCH 29.9      MCHC 33.6      RDW 13.2      Platelet Count 225      % Neutrophils 86      % Lymphocytes 9      % Monocytes 4      % Eosinophils 0      % Basophils 0      % Immature Granulocytes 1      NRBCs per 100 WBC 0      Absolute Neutrophils 8.7 (*)     Absolute Lymphocytes 0.9      Absolute Monocytes 0.4      Absolute Eosinophils 0.0      Absolute Basophils 0.0      Absolute Immature Granulocytes 0.1      Absolute NRBCs 0.0     LIPASE - Normal    Lipase 24     MAGNESIUM - Normal    Magnesium 1.9     INFLUENZA A/B, RSV, & SARS-COV2 PCR - Normal    Influenza A PCR Negative      Influenza B PCR Negative      RSV PCR Negative      SARS CoV2 PCR Negative          RADIOLOGY:     Reviewed all pertinent imaging. Please see official radiology report.  CT Head w/o Contrast   Final Result   IMPRESSION:   1.  No significant change from prior.      XR Chest Port 1 View   Final Result   IMPRESSION:       Unchanged moderately elevated right hemidiaphragm with adjacent right basilar atelectasis. Lungs are otherwise clear, without evidence of pneumonia. No pleural effusions or pneumothorax. Normal pulmonary vascularity.       Stable cardiomegaly. Unchanged aortic atherosclerotic calcifications.      Calcifications within the right axilla/chest wall are unchanged.          EKG:     EKG #1  Atrial fibrillation slow ventricular response poor anterior progression normal axis incomplete right bundle branch block.    Time:270106    Ventricular rate 55 bmp  Axis normal  VA interval ms  QRS duration 110 ms  QT//329 ms    Compared to previous EKG on August 6/20/2024 atrial fibrillation rate of 63 poor anterior progression.  Normal axis.  I have independently reviewed the above ECG.    PROCEDURES:     Procedures      I, Tanesha Bowen, am serving as a scribe to document services personally performed by Dr. Montiel based on my observation and the provider's statements to me. I, Rhoan Montiel MD attest that Tanesha Bowen is acting in a scribe capacity, has observed my performance of the services and has documented them in accordance with my direction.    Rhona Montiel M.D.  Emergency Medicine  [facility] EMERGENCY DEPARTMENT  [facility] [facility] [id number] [phone]  Dept: [phone]       Rhona Montiel MD  08/11/24 4860

## 2024-08-11 NOTE — ED NOTES
Bed: JNED-06  Expected date: 8/11/24  Expected time: 2:17 PM  Means of arrival: Ambulance  Comments:  [place] 89 yo F weak, diarrhea

## 2024-08-11 NOTE — ED NOTES
..[facility] ED Handoff Report    ED Chief Complaint:     Patient from [facility]. EMS was called due to patient having nausea, vomiting and diarrhea for past 3 days. According to the facility patient was hypotensive. Upon arrival patient VSS. Blood pressure within normal limits. Staff also stated that patient seems more lethargic than baseline.    ED Diagnosis:  (R78.89) Elevated digoxin level  Comment: Dig 3.1  Plan: Monitor Dig levels q4hr per poison control    (R19.7) Diarrhea, unspecified type  Comment: None noted at this time  Plan: Admission    (N17.9) SUNSHINE (acute kidney injury) (H24)  Comment: Creat elevated  Plan: Monitor    (I48.20) Chronic a-fib (H)  Comment: Rate fluctuates bradycardic, lowest rate of 32 for a few seconds.  Plan: Monitor rate, see notes from poison control    (Z79.01) Anticoagulated by anticoagulation treatment  Comment: On Warfarin  Plan: Labs WNL       PMH:    Past Medical History:   Diagnosis Date    Chronic atrial fibrillation (H)     CKD (chronic kidney disease)     Stage 3a    History of skin cancer     HLD (hyperlipidemia)     HTN (hypertension)     Hypothyroidism     T2DM (type 2 diabetes mellitus) (H)         Code Status:  Prior     Falls Risk: Yes Band: Applied    Current Living Situation/Residence:   Lives in [facility]     Elimination Status: Continent: No, bowel and bladder program, and wears briefs     Activity Level: 2 assist    Patients Preferred Language:  English     Needed: No    Vital Signs:  /56   Pulse 54   Temp 97.6  F (36.4  C) (Oral)   Resp 17   Wt 59 kg (130 lb)   SpO2 96%   BMI 21.63 kg/m       Cardiac Rhythm: Afib    Pain Score: 0/10    Is the Patient Confused:  Yes    Last Food or Drink: 8/10/24 at 2000    Focused Assessment:  Cardiac    Tests Performed: Done: Labs and Imaging    Treatments Provided:  Supportive    Family Dynamics/Concerns: No    Family Updated On Visitor Policy: Yes    Plan of Care  Communicated to Family: Yes    Who Was Updated about Plan of Care: Daughter at bedside and facility    Belongings Checklist Done and Signed by Patient: No    Belongings Sent with Patient: Yes    Medications sent with patient: None to send at this time    Covid: symptomatic, negative    Additional Information: Poison control following    RN: Sherin Harris RN 8/11/2024 6:17 PM

## 2024-08-12 NOTE — PROGRESS NOTES
08/12/24 1039   Appointment Info   Signing Clinician's Name / Credentials (OT) Swati Wen OT   Living Environment   People in Home facility resident   Current Living Arrangements other (see comments)  (Memory Care)   Home Accessibility no concerns   Transportation Anticipated health plan transportation   Living Environment Comments Supervision for all ADLs and Mobility due to confusion   Self-Care   Usual Activity Tolerance moderate   Current Activity Tolerance fair   Equipment Currently Used at Home walker, rolling;grab bar, toilet;grab bar, tub/shower;raised toilet seat;shower chair   Fall history within last six months no   Instrumental Activities of Daily Living (IADL)   Previous Responsibilities   (has assist w/all IADLs)   General Information   Onset of Illness/Injury or Date of Surgery 08/11/24   Referring Physician Dr Joiner   Patient/Family Therapy Goal Statement (OT) go home   Additional Occupational Profile Info/Pertinent History of Current Problem Alicia Coates is a 89 year old female admitted on 8/11/2024. 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  Found to have elevated digoxin level on evaluation at 3.1.  Admitted for management of possible side effects of elevated digoxin versus possible viral gastroenteritis.   Performance Patterns (Routines, Roles, Habits) Alicia Coates is a 89 year old female admitted on 8/11/2024. 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  Found to have elevated digoxin level on evaluation at 3.1.  Admitted for management of possible side effects of elevated digoxin versus possible viral gastroenteritis.    Existing Precautions/Restrictions fall   Left Upper Extremity (Weight-bearing Status) full weight-bearing (FWB)   Right Upper Extremity (Weight-bearing Status) full weight-bearing (FWB)   Left Lower Extremity (Weight-bearing Status) full weight-bearing (FWB)   Right Lower Extremity (Weight-bearing Status) full weight-bearing (FWB)   Cognitive Status Examination   Orientation Status person   Follows Commands follows multi-step commands;50-74% accuracy   Visual Perception   Visual Impairment/Limitations corrective lenses full-time   Sensory   Sensory Quick Adds sensation intact   Pain Assessment   Patient Currently in Pain No   Posture   Posture forward head position   Range of Motion Comprehensive   General Range of Motion no range of motion deficits identified   Strength Comprehensive (MMT)   General Manual Muscle Testing (MMT) Assessment no strength deficits identified   Muscle Tone Assessment   Muscle Tone Quick Adds No deficits were identified   Coordination   Upper Extremity Coordination No deficits were identified   Bed Mobility   Bed Mobility supine-sit;sit-supine   Supine-Sit [place] (Bed Mobility) contact guard   Sit-Supine [place] (Bed Mobility) contact guard   Transfers   Transfers toilet transfer   Toilet Transfer   [place] Level (Toilet Transfer) contact guard   Balance   Balance Assessment standing dynamic balance   Standing Balance: Dynamic contact guard   Activities of Daily Living   BADL Assessment/Intervention lower body dressing   Lower Body Dressing Assessment/Training   [place] Level (Lower Body Dressing) contact guard assist   Toileting   [place] Level (Toileting) contact guard assist   Assistive Devices (Toileting) raised toilet seat   Clinical Impression   Criteria for Skilled Therapeutic Interventions Met (OT) Yes, treatment indicated   OT Diagnosis A fib, SUNSHINE   Influenced by the following impairments fatigue, decreased ADLs/balanced   OT Problem List-Impairments  impacting ADL balance;cognition;activity tolerance impaired   Assessment of Occupational Performance 3-5 Performance Deficits   Identified Performance Deficits fatigue, decreased ADLs/balance, confusion   Planned Therapy Interventions (OT) ADL retraining;balance training   Clinical Decision Making Complexity (OT) detailed assessment/moderate complexity   Risk & Benefits of therapy have been explained evaluation/treatment results reviewed;care plan/treatment goals reviewed;risks/benefits reviewed;participants voiced agreement with care plan   OT Total Evaluation Time   OT Eval, Moderate Complexity Minutes (95233) 10   OT Goals   OT Predicted Duration/Target Date for Goal Attainment 08/18/24   OT: Hygiene/Grooming supervision/stand-by assist   OT: Upper Body Dressing Supervision/stand-by assist   OT: Lower Body Dressing Supervision/stand-by assist   OT: Toilet Transfer/Toileting Supervision/stand-by assist   Self-Care/Home Management   Self-Care/Home Mgmt/ADL, Compensatory, Meal Prep Minutes (30816) 10   Treatment Detail/Skilled Intervention transfers/toileting CGA w/cues for walker safety, G/H CGA standing at sink w/ cues to complete all tasks,  LB dress CGA due to decreased standing balance   Symptoms Noted During/After Treatment (Meal Preparation/Planning Training) fatigue   OT Discharge Planning   OT Plan transfers/toileting, G/H, LB dress   OT Discharge Recommendation (DC Rec) Long term care facility;other (see comments);home with home care occupational therapy  (Lives in Memory Care)   OT Rationale for DC Rec Recommend return to memory care vs LTC pending level of support pt can receive at [facility].  Pt will need therapy at either location.  Pt will need 24 hour supervision and assist with all mobility and ADLs.   OT Brief overview of current status CGA w/ADLs and mobility

## 2024-08-12 NOTE — PROGRESS NOTES
"    [facility]    Progress Note - Hospitalist Service       Date of Admission:  8/11/2024    Assessment & Plan   Alicia Coates is a 89 year old female admitted on 8/11/2024. 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  Found to have elevated digoxin level on evaluation at 3.1.  Admitted for management of possible side effects of elevated digoxin versus possible viral gastroenteritis.     Diarrhea  Elevated Digoxin Level  Bradycardia  Atrial Fibrillation  1 day of substantial diarrhea and report of some vomiting the last day, found to have elevated digoxin level of 3.1 on admission, repeat 4 hours later improved to 2.3.  Unclear if the elevated digoxin is what caused the diarrhea with known GI side effects of digoxin, or if patient has viral gastroenteritis and corresponding volume loss led to increased concentration of digoxin.  No leukocytosis.  Review of systems unremarkable for clear infectious cause.  Hemodynamically stable. Has been bradycardic primarily in low 50s with occasional dips into the 40s though has been asymptomatic with no dizziness or lightheadedness.  Satting well on room air.  Negative for COVID flu or RSV, chest x-ray without consolidation.  -ED provider spoke with poison control about elevated digoxin with following recommendation:              \"Poison Control called, they state that patient's Potassium and Digoxin levels should be monitored q4hrs. Patient should also should have continuous fluids running. If patient's cardiac rhythm changes and becomes more bradycardic, poison control should be notified immediately and reversal medication should be given.\"     -discussed with poison control again 8/12/24, recommended continuing digoxin, K, monitoring q4h. Will recheck Mg and replete as indicate. " Continue to monitor cardiac and hemodynamic functioning. Could consider atropine for symptomatic bradycardia as well. Suspecting chronic vs acute rise in digoxin.      -If digoxin supratherapeutic, symptomatic bradycardia, hemodynamic instability, recommends 1/2 - 1 vial of digifab  -Every 4 hours digoxin check  -Every 4 hours potassium check  -RN to notify for sustained HR less than 40  -Daily BMP  -Poison control to continue following a.m.  -Maintenance fluid LR 75 mL/h  -Continue Holding digoxin per poison control              -Day team to consider reducing dose once is in therapeutic range (possibly 150 mcg)  -PTA apixaban 2.5 mg twice daily     SUNSHINE, likely pre-renal  Per report from family patient has had decreased p.o. intake recently, creatinine elevated to 1.27 on admission BUN 28.4.  Suspect this is prerenal SUNSHINE secondary to dehydration. Cr. Improved to 0.87 8/12  -Maintenance fluid LR 75 mL/h  -Daily BMP  -Continuing to hold lisinopril due to pt normotensive, consider restarting if persistently hypertensive     Hypokalemia  Potassium on admission 3.6, decreased to 3.2 later on 8/11.  -Potassium replacement protocol started     Weakness  Failure to Thrive   Dementia  Goals of Care  After discussion with patient's son, concern for failure to thrive in the setting of her advancing dementia and recent stroke.  Has been losing interest in activities of daily living with decreased p.o. intake.  Would recommend ongoing discussions with patient and family about goals of care given circumstances of this admission and planning for future admissions.   - Was discussion from family of consulting palliative care during previous admission in July 2024. May be worth revisiting to see if continued interest.  -discussed w/son at bedside 8/12/24, offered hospice services referral however son would like to wait until she returns to her facility before making decisions, continue treatments as indicated for now        Chronic  "problems:     HTN  Was reported to be [facility], has improved and now normotensive to hypertensive.  -PTA chlorthalidone  -Holding PTA lisinopril in setting of SUNSHINE, normotensive currently     HLD  Hx of L Occipital Stroke 7/2024  -PTA atorvastatin     Hypothyroidism  -PTA levothyroxine (25 mcg weekdays, 50 mcg weekends ordered)     T2DM  Not on home meds.  Glucose on admission 130. Glucose 74 8/12. Most recent A1c 1 year ago 7.3.  -Continue to monitor, no sliding scale insulin at this time        Observation Goals: -diagnostic tests and consults completed and resulted, -vital signs normal or at patient baseline, -safe disposition plan has been identified, Nurse to notify provider when observation goals have been met and patient is ready for discharge.  Diet: Moderate Consistent Carb (60 g CHO per Meal) Diet    DVT Prophylaxis: DOAC  Elena Catheter: Not present  Fluids: LR 75 mL/hr  Lines: None     Cardiac Monitoring: ACTIVE order. Indication: Bradycardias (48 hours)  Code Status: No CPR- Do NOT Intubate      Clinically Significant Risk Factors Present on Admission        # Hypokalemia: Lowest K = 3 mmol/L in last 2 days, will replace as needed        # Drug Induced Coagulation Defect: home medication list includes an anticoagulant medication    # Hypertension: Noted on problem list                      Disposition Plan      Expected Discharge Date: 08/13/2024      Destination: assisted living          The patient's care was discussed with the Attending Physician, Dr. Joiner .    Homero Jama MD  Hospitalist Service  [facility]  Securely message with [employer] (more info)  Text page via [employer] Paging/Directory   ______________________________________________________________________    Interval History   Patient reports she feels \"normal\" this morning, was able to eat a little bit of breakfast this AM. Pt was only oriented to self, had intermittent runs of bradycardia " into 40's but not reporting SOB, syncope, dizziness, etc. Just that she feels fatigued.    Son was present at bedside, sounds like they are interested in pursuing hospice care but want to wait until after she is discharged back to her home facility. Consult placed to OP hospice to provide information. DNR/DNI, continue treatment as indicated for now. Son reports her mentation has been declining significantly since she had her stroke ~3 weeks ago and has noticed poor PO intake.     Physical Exam   Vital Signs: Temp: 97.2  F (36.2  C) Temp src: Oral BP: 128/63 Pulse: 50   Resp: 16 SpO2: 96 % O2 Device: None (Room air)    Weight: 130 lbs 4.67 oz    Constitutional: fatigued and alert  Eyes: Pupils equal, round and reactive to light, extra ocular muscles intact, sclera clear, conjunctiva normal  ENT: normocephalic, without obvious abnormality, atraumatic  Hematologic / Lymphatic: no cervical lymphadenopathy and no supraclavicular lymphadenopathy  Respiratory: No increased work of breathing, good air exchange, clear to auscultation bilaterally, no crackles or wheezing  Cardiovascular: Bradycardic, irregular rhythm, 3/6 systolic murmur loudest at LUSB  GI: No scars, normal bowel sounds, soft, non-distended, non-tender, no masses palpated, no hepatosplenomegally  Skin: no rashes and no lesions  Musculoskeletal: There is no redness, warmth, or swelling of the joints. Spontaneously moves all four extremities.  Neurologic: Awake, alert, oriented to name only.  Cranial nerves II-XII are intact.  Motor is 5 out of 5 bilaterally. Sensory is intact to light touch in upper and lower extremities.   Neuropsychiatric: General: normal, calm, and normal eye contact  Level of consciousness: alert / normal  Affect: normal and tired  Orientation: oriented only to self         Data     I have personally reviewed the following data over the past 24 hrs:    11.3 (H)  \   14.7   / 215     141 103 26.5 (H) /  74   3.8 24 0.87 \     ALT: 20 AST:  36 AP: 76 TBILI: 1.1   ALB: 3.6 TOT PROTEIN: 6.7 LIPASE: 24     INR:  1.80 (H) PTT:  N/A   D-dimer:  N/A Fibrinogen:  N/A       Imaging results reviewed over the past 24 hrs:   Recent Results (from the past 24 hour(s))   XR Chest Port 1 View    Narrative    EXAM: XR CHEST PORT 1 VIEW  LOCATION: [facility]  DATE: 8/11/2024    INDICATION: Weakness.   COMPARISON: Chest radiograph 7/19/2024.       Impression    IMPRESSION:     Unchanged moderately elevated right hemidiaphragm with adjacent right basilar atelectasis. Lungs are otherwise clear, without evidence of pneumonia. No pleural effusions or pneumothorax. Normal pulmonary vascularity.    Stable cardiomegaly. Unchanged aortic atherosclerotic calcifications.    Calcifications within the right axilla/chest wall are unchanged.   CT Head w/o Contrast    Narrative    EXAM: CT HEAD W/O CONTRAST  LOCATION: [facility]  DATE: 8/11/2024    INDICATION: weakness recent cva  COMPARISON: 8/6/2024  TECHNIQUE: Routine CT Head without IV contrast. Multiplanar reformats. Dose reduction techniques were used.    FINDINGS:  INTRACRANIAL CONTENTS: No intracranial hemorrhage, extraaxial collection, or mass effect.  Expected interval evolution of known MCA distribution. No new acute infarct identified. Mild presumed chronic small vessel ischemic changes. Mild to moderate   generalized volume loss. No hydrocephalus.     VISUALIZED ORBITS/SINUSES/MASTOIDS: Prior bilateral cataract surgery. Visualized portions of the orbits are otherwise unremarkable. No paranasal sinus mucosal disease. No middle ear or mastoid effusion.    BONES/SOFT TISSUES: No acute abnormality.      Impression    IMPRESSION:  1.  No significant change from prior.     Homero Jama MD  PGY-2  [facility]  [facility]   August 12, 2024

## 2024-08-12 NOTE — PROGRESS NOTES
"PRIMARY DIAGNOSIS: \"GENERIC\" NURSING  OUTPATIENT/OBSERVATION GOALS TO BE MET BEFORE DISCHARGE:  ADLs back to baseline: No     Activity and level of assistance: Assist of one     Pain status: Pain free.     Return to near baseline physical activity: No             Discharge Planner Nurse  Safe discharge environment identified: Yes- Patient is being followed by poison control; Son would like to talk to [facility] about hospice options through their facility.  Barriers to discharge: Yes       Entered by: Heidi Mahoney RN 08/12/2024 2:20 PM  "

## 2024-08-12 NOTE — PROGRESS NOTES
[facility]  OUTPATIENT OCCUPATIONAL THERAPY  EVALUATION  PLAN OF TREATMENT FOR OUTPATIENT REHABILITATION  (COMPLETE FOR INITIAL CLAIMS ONLY)  Patient's Last Name, First Name, M.I.  YOB: 1932  Alicia Coates                          Provider's Name  [facility] Medical Record No.  [id number]                             Onset Date:  08/11/24   Start of Care Date:      Type:     ___PT   _X_OT   ___SLP Medical Diagnosis:                       OT Diagnosis:  A fib, SUNSHINE Visits from SOC:  1     See note for plan of treatment, functional goals and certification details    I CERTIFY THE NEED FOR THESE SERVICES FURNISHED UNDER        THIS PLAN OF TREATMENT AND WHILE UNDER MY CARE     (Physician co-signature of this document indicates review and certification of the therapy plan).                                08/12/24 1039   Appointment Info   Signing Clinician's Name / Credentials (OT) Swati Wen OT   Living Environment   People in Home facility resident   Current Living Arrangements other (see comments)  (Memory Care)   Home Accessibility no concerns   Transportation Anticipated health plan transportation   Living Environment Comments Supervision for all ADLs and Mobility due to confusion   Self-Care   Usual Activity Tolerance moderate   Current Activity Tolerance fair   Equipment Currently Used at Home walker, rolling;grab bar, toilet;grab bar, tub/shower;raised toilet seat;shower chair   Fall history within last six months no   Instrumental Activities of Daily Living (IADL)   Previous Responsibilities   (has assist w/all IADLs)   General Information   Onset of Illness/Injury or Date of Surgery 08/11/24   Referring Physician Dr Joiner   Patient/Family Therapy Goal Statement (OT) go home   Additional Occupational Profile Info/Pertinent History of Current Problem Aliciawilliam Coates is a 89 year old female admitted on 8/11/2024. 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  Found to have elevated digoxin level on evaluation at 3.1.  Admitted for management of possible side effects of elevated digoxin versus possible viral gastroenteritis.   Performance Patterns (Routines, Roles, Habits) Alicia Coates is a 89 year old female admitted on 8/11/2024. 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  Found to have elevated digoxin level on evaluation at 3.1.  Admitted for management of possible side effects of elevated digoxin versus possible viral gastroenteritis.   Existing Precautions/Restrictions fall   Left Upper Extremity (Weight-bearing Status) full weight-bearing (FWB)   Right Upper Extremity (Weight-bearing Status) full weight-bearing (FWB)   Left Lower Extremity (Weight-bearing Status) full weight-bearing (FWB)   Right Lower Extremity (Weight-bearing Status) full weight-bearing (FWB)   Cognitive Status Examination   Orientation Status person   Follows Commands follows multi-step commands;50-74% accuracy   Visual Perception   Visual Impairment/Limitations corrective lenses full-time   Sensory   Sensory Quick Adds sensation intact   Pain Assessment   Patient Currently in Pain No   Posture   Posture forward head position   Range of Motion Comprehensive   General Range of Motion no range of motion deficits identified   Strength Comprehensive (MMT)   General Manual Muscle Testing (MMT) Assessment no strength deficits identified   Muscle Tone Assessment   Muscle Tone Quick Adds No deficits were identified   Coordination   Upper Extremity Coordination No deficits were identified   Bed Mobility   Bed Mobility  supine-sit;sit-supine   Supine-Sit Logan (Bed Mobility) contact guard   Sit-Supine Logan (Bed Mobility) contact guard   Transfers   Transfers toilet transfer   Toilet Transfer   Logan Level (Toilet Transfer) contact guard   Balance   Balance Assessment standing dynamic balance   Standing Balance: Dynamic contact guard   Activities of Daily Living   BADL Assessment/Intervention lower body dressing   Lower Body Dressing Assessment/Training   Logan Level (Lower Body Dressing) contact guard assist   Toileting   Logan Level (Toileting) contact guard assist   Assistive Devices (Toileting) raised toilet seat   Clinical Impression   Criteria for Skilled Therapeutic Interventions Met (OT) Yes, treatment indicated   OT Diagnosis A fib, SUNSHINE   Influenced by the following impairments fatigue, decreased ADLs/balanced   OT Problem List-Impairments impacting ADL balance;cognition;activity tolerance impaired   Assessment of Occupational Performance 3-5 Performance Deficits   Identified Performance Deficits fatigue, decreased ADLs/balance, confusion   Planned Therapy Interventions (OT) ADL retraining;balance training   Clinical Decision Making Complexity (OT) detailed assessment/moderate complexity   Risk & Benefits of therapy have been explained evaluation/treatment results reviewed;care plan/treatment goals reviewed;risks/benefits reviewed;participants voiced agreement with care plan   OT Total Evaluation Time   OT Eval, Moderate Complexity Minutes (45438) 10   OT Goals   Therapy Frequency (OT) 5 times/week   OT Predicted Duration/Target Date for Goal Attainment 08/18/24   OT: Hygiene/Grooming supervision/stand-by assist   OT: Upper Body Dressing Supervision/stand-by assist   OT: Lower Body Dressing Supervision/stand-by assist   OT: Toilet Transfer/Toileting Supervision/stand-by assist   Self-Care/Home Management   Self-Care/Home Mgmt/ADL, Compensatory, Meal Prep Minutes (45858) 10   Treatment  Detail/Skilled Intervention transfers/toileting CGA w/cues for walker safety, G/H CGA standing at sink w/ cues to complete all tasks,  LB dress CGA due to decreased standing balance   Symptoms Noted During/After Treatment (Meal Preparation/Planning Training) fatigue   OT Discharge Planning   OT Plan transfers/toileting, G/H, LB dress, may go hospice   OT Discharge Recommendation (DC Rec) Long term care facility;other (see comments);home with home care occupational therapy  (Lives in Memory Care)   OT Rationale for DC Rec Recommend return to memory care vs LTC pending level of support pt can receive at [facility].  Pt will need therapy at either location.  Pt will need 24 hour supervision and assist with all mobility and ADLs.   OT Brief overview of current status CGA w/ADLs and mobility   Total Session Time   Timed Code Treatment Minutes 10   Total Session Time (sum of timed and untimed services) 20

## 2024-08-12 NOTE — CONSULTS
Care Management Initial Consult    General Information  Assessment completed with: Children,    Type of CM/SW Visit: Initial Assessment    Primary Care Provider verified and updated as needed: Yes   Readmission within the last 30 days: no previous admission in last 30 days      Reason for Consult: discharge planning  Advance Care Planning: Advance Care Planning Reviewed: no concerns identified          Communication Assessment  Patient's communication style: spoken language (English or Bilingual)    Hearing Difficulty or Deaf: no        Cognitive  Cognitive/Neuro/Behavioral: .WDL except, orientation  Level of Consciousness: alert, confused  Arousal Level: opens eyes spontaneously  Orientation: disoriented to, place, time, situation  Mood/Behavior: flat affect     Speech: clear    Living Environment:   People in home: facility resident     Current living Arrangements: assisted living  [facility] of Facility: [facility]   Able to return to prior arrangements: other (see comments)       Family/Social Support:  Care provided by:    Provides care for: no one, unable/limited ability to care for self  Marital Status:              Description of Support System:           Current Resources:   Patient receiving home care services: No     Community Resources: Skilled Nursing Facility  Equipment currently used at home: walker, rolling, grab bar, toilet, grab bar, tub/shower, raised toilet seat, shower chair  Supplies currently used at home: Incontinence Supplies    Employment/Financial:  Employment Status: retired        Financial Concerns:     Referral to Financial Worker: No       Does the patient's insurance plan have a 3 day qualifying hospital stay waiver?  No    Lifestyle & Psychosocial Needs:  Social Determinants of Health     Food Insecurity: Not on file   Depression: Not at risk (3/1/2022)    Received from [facility], [facility]    PHQ-2     PHQ-2  TOTAL SCORE: 0   Housing Stability: Not on file   Tobacco Use: Low Risk  (7/19/2024)    Patient History     Smoking Tobacco Use: Never     Smokeless Tobacco Use: Never     Passive Exposure: Not on file   Financial Resource Strain: High Risk (12/22/2021)    [facility], [facility]    Financial Resource Strain     Difficulty of Paying Living Expenses: Not on file     Difficulty of Paying Living Expenses: Not on file   Alcohol Use: Not on file   Transportation Needs: Not on file   Physical Activity: Not on file   Interpersonal Safety: Not on file   Stress: Not on file   Social Connections: Unknown (12/22/2021)    [facility], [facility]    Social Connections     Frequency of Communication with Friends and Family: Not on file   Health Literacy: Not on file       Functional Status:  Prior to admission patient needed assistance:   Dependent ADLs:: Ambulation-walker, Bathing, Dressing, Incontinence, Grooming  Dependent IADLs:: Cleaning, Cooking, Laundry, Shopping, Meal Preparation, Medication Management, Money Management, Transportation, Incontinence  Assesssment of Functional Status: Not at baseline with ADL Functioning    Mental Health Status:  Mental Health Status: No Current Concerns       Chemical Dependency Status:  Chemical Dependency Status: No Current Concerns             Values/Beliefs:  Spiritual, Cultural Beliefs, Tenriism Practices, Values that affect care: no               Additional Information:  VIV met with pt's son to introduce role of CM, complete  initial assessment, and to discuss needs at time of d/c. Pt comes from [facility]; anticipated to return when medically stable. VIV addressed hospice questions with son who stated that he is interested in having an informative meeting with in house agency at pt's facility to ensure that  continuity of her care is as seamless as possible. SW left message for RN at facility ([phone]) to inform of family request. Outpt hospice referral/order to be placed at discharge; VIV notified MD. Care management following.    CM to continue to follow through hospitalization.  11:42 AM    Brenna Kjellberg, BSW LSW  8/12/2024

## 2024-08-12 NOTE — PLAN OF CARE
Occupational Therapy Discharge Summary    Reason for therapy discharge:    Discharged to family wants pt to go Hospice, not interested in therapy    Progress towards therapy goal(s). See goals on Care Plan in [facility] record for goal details.  Discharge per family request    Therapy recommendation(s):    No further therapy is recommended.

## 2024-08-12 NOTE — SIGNIFICANT EVENT
"House Note    Time of event: 8:51 AM August 12, 2024    Description of event:  Paged by RN regarding elevated BP (187/86) and low BG (69). On chart review, noted HR 43.    Given bradycardia, I went to evaluate patient. She was sitting up eating breakfast. She stated that she is feeling \"fine\" this morning. Denies light-headedness/dizziness, chest pain, shortness of breath, confusion.     Telemetry shows a fib with SVR, rate mostly upper 40s-low 50s, very rarely dips into 30s.     On exam, patient is well appearing, sitting up eating breakfast, answering questions appropriately. HR is bradycardic and irregularly irregular. Lungs CTAB.     Plan:  - Repeat EKG   - Repeat vitals in 15 min   - RN to update me or Dr. Jama with any clinical changes    Discussed with: bedside nurse    Marilyn Diego MD    "

## 2024-08-12 NOTE — CARE PLAN
"PRIMARY DIAGNOSIS: \"GENERIC\" NURSING  OUTPATIENT/OBSERVATION GOALS TO BE MET BEFORE DISCHARGE:  ADLs back to baseline: No    Activity and level of assistance:assist of 1 with gait belt and walker    Pain status: Pain free.    Return to near baseline physical activity: No     Discharge Planner Nurse   Safe discharge environment identified: No  Barriers to discharge: Yes       Entered by: Tony Burris RN 08/12/2024 2:39 AM     Please review provider order for any additional goals.   Nurse to notify provider when observation goals have been met and patient is ready for discharge.  "

## 2024-08-12 NOTE — PROGRESS NOTES
Talked with Poison control.  Per Marilyn at poison control they will be signing off and every 4 hour digoxin levels and Potassium levels can be discontinued.  Page sent to house officer via [facility].

## 2024-08-12 NOTE — PROGRESS NOTES
Received a call from Jeanette at [facility] requesting a call back.     Called jeanette, no answer.

## 2024-08-12 NOTE — PLAN OF CARE
PRIMARY DIAGNOSIS: GENERALIZED WEAKNESS    OUTPATIENT/OBSERVATION GOALS TO BE MET BEFORE DISCHARGE  1. Orthostatic performed: No    2. Tolerating PO medications: Yes    3. Return to near baseline physical activity: No    4. Cleared for discharge by consultants (if involved): N/A    Discharge Planner Nurse   Safe discharge environment identified: Yes  Barriers to discharge: Yes       Entered by: Ivy Louis RN 08/12/2024 12:06 AM     Please review provider order for any additional goals.   Nurse to notify provider when observation goals have been met and patient is ready for discharge.Goal Outcome Evaluation:

## 2024-08-12 NOTE — PLAN OF CARE
Physical Therapy Discharge Summary    Reason for therapy discharge:    Patient/family request discontinuation of services.    Progress towards therapy goal(s). See goals on Care Plan in [facility] electronic health record for goal details.  Goals not met.  Barriers to achieving goals:   discharge on same date as initial evaluation.    Therapy recommendation(s):    No further therapy is recommended. Patient and family requesting discontinuation of services.    Dayana Bedolla, PT  8/12/2024

## 2024-08-12 NOTE — H&P
"    [facility]    History and Physical - Hospitalist Service       Date of Admission:  8/11/2024    Assessment & Plan      Alicia Coates is a 89 year old female admitted on 8/11/2024. 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  Found to have elevated digoxin level on evaluation at 3.1.  Admitted for management of possible side effects of elevated digoxin versus possible viral gastroenteritis.    Diarrhea  Elevated Digoxin Level  Bradycardia  Atrial Fibrillation  1 day of substantial diarrhea and report of some vomiting the last day, found to have elevated digoxin level of 3.1 on admission, repeat 4 hours later improved to 2.3.  Unclear if the elevated digoxin is what caused the diarrhea with known GI side effects of digoxin, or if patient has viral gastroenteritis and corresponding volume loss led to increased concentration of digoxin.  No leukocytosis.  Review of systems unremarkable for clear infectious cause.  Hemodynamically stable. Has been bradycardic primarily in low 50s with occasional dips into the 40s though has been asymptomatic with no dizziness or lightheadedness.  Satting well on room air.  Negative for COVID flu or RSV, chest x-ray without consolidation.  -ED provider spoke with poison control about elevated digoxin with following recommendation:   \"Poison Control called, they state that patient's Potassium and Digoxin levels should be monitored q4hrs. Patient should also should have continuous fluids running. If patient's cardiac rhythm changes and becomes more bradycardic, poison control should be notified immediately and reversal medication should be given. \"  -Every 4 hours digoxin check  -Every 4 hours potassium check  -RN to notify for sustained HR less than 40  -Daily BMP  -Poison control to " continue following a.m.  -Maintenance fluid LR 75 mL/h  -Holding digoxin   -Day team to consider reducing dose once is in therapeutic range  -PTA apixaban 2.5 mg twice daily    SUNSHINE, likely pre-renal  Per report from family patient has had decreased p.o. intake recently, creatinine elevated to 1.27 on admission BUN 28.4.  Suspect this is prerenal SUNSHINE secondary to dehydration.  -Maintenance fluid LR 75 mL/h  -Daily BMP    Hypokalemia  Potassium on admission 3.6, decreased to 3.2 later on 8/11.  -Potassium replacement protocol started    Weakness  Failure to Thrive   Dementia  Goals of Care  After discussion with patient's son, concern for failure to thrive in the setting of her advancing dementia and recent stroke.  Has been losing interest in activities of daily living with decreased p.o. intake.  Would recommend ongoing discussions with patient and family about goals of care given circumstances of this admission and planning for future admissions.   - Was discussion from family of consulting palliative care during previous admission in July 2024. May be worth revisiting to see if continued interest.      Chronic problems:    HTN  Was reported to be hypotensive at [facility], has improved and now normotensive to hypertensive.  -PTA chlorthalidone  -Holding PTA lisinopril in setting of SUNSHINE    HLD  Hx of L Occipital Stroke 7/2024  -PTA atorvastatin    Hypothyroidism  -PTA levothyroxine (25 mcg weekdays, 50 mcg weekends ordered)    T2DM  Not on home meds.  Glucose on admission 130.  Most recent A1c 1 year ago 7.3.  -Continue to monitor in the a.m., can consider low-dose sliding scale if needed        Observation Goals: -diagnostic tests and consults completed and resulted, -vital signs normal or at patient baseline, -safe disposition plan has been identified, Nurse to notify provider when observation goals have been met and patient is ready for discharge.  Diet: Moderate Consistent Carb (60 g CHO per Meal) Diet    DVT  Prophylaxis: DOAC  Elena Catheter: Not present  Fluids: LR 75 ml/hr  Lines: None     Cardiac Monitoring: ACTIVE order. Indication: Bradycardias (48 hours)  Code Status: No CPR- Do NOT Intubate      Clinically Significant Risk Factors Present on Admission        # Hypokalemia: Lowest K = 3.2 mmol/L in last 2 days, will replace as needed        # Drug Induced Coagulation Defect: home medication list includes an anticoagulant medication   # Acute Kidney Injury, unspecified: based on a >150% or 0.3 mg/dL increase in last creatinine compared to past 90 day average, will monitor renal function  # Hypertension: Noted on problem list                          Disposition Plan      Expected Discharge Date: 08/12/2024                The patient's care was discussed with the Attending Physician, Dr. Pollock and senior resident Dr. Castillo .  Patient to be formally staffed in the lela Coker MD  Hospitalist Service  [facility]  Securely message with [employer] (more info)  Text page via [facility] Paging/Directory   ______________________________________________________________________    Chief Complaint   Diarrhea    History is obtained from the patient's son Scott via phone and from chart review.  Patient unable to provide history.    History of Present Illness   Alicia Coates is a 89 year old female with PMH of dementia, left occipital stroke July 2024, A-fib on apixaban, hypertension, hypothyroidism, type 2 diabetes who presented from [facility] with 1 day of substantial diarrhea and possible hypotension.    Upon talking to patient's son Scott, he states he was notified by [facility] that patient had an episode of substantial nonbloody diarrhea early on the day 8/11 and was hypotensive down to 80/55, also has had some vomiting per report.  Patient's son confirmed he wanted her to come into the emergency department for evaluation. Was found to be normotensive upon presentation  though pale and weak.     Patient unable to provide any history.  States she does have a mild 2 out of 10 headache with no reports of falls.  Not experiencing any significant discomfort now.  Notably no fevers or chills, no vision changes, no chest pain or shortness of breath, no abdominal pain, no new weakness.    Patient's son expressed significant concern for patient's decline since her stroke last month.  He has noticed that she is seemingly lost interest in most daily activities, especially eating.  She will take 2 bites of food and states she is fully no longer interested.  Overall has had decreased p.o. intake lately.  He feels that this is likely combination of progressing dementia along with consequences of the stroke she had in July, particularly loss of vision.    Has been living at [facility] where she receives assistance with her medications as well.  Son Scott and daughter Cristina are her primary contacts.     Past Medical History    Past Medical History:   Diagnosis Date    Chronic atrial fibrillation (H)     CKD (chronic kidney disease)     Stage 3a    History of skin cancer     HLD (hyperlipidemia)     HTN (hypertension)     Hypothyroidism     T2DM (type 2 diabetes mellitus) (H)        Past Surgical History   Past Surgical History:   Procedure Laterality Date    BUNIONECTOMY      MOHS MICROGRAPHIC PROCEDURE      NO HISTORY OF SURGERY  06/11/2013    derm       Prior to Admission Medications   Prior to Admission Medications   Prescriptions Last Dose Informant Patient Reported? Taking?   apixaban ANTICOAGULANT (ELIQUIS) 2.5 MG tablet 8/11/2024 at am  No Yes   Sig: Take 1 tablet (2.5 mg) by mouth 2 times daily   atorvastatin (LIPITOR) 40 MG tablet 8/11/2024 at am  No Yes   Sig: Take 1 tablet (40 mg) by mouth daily   chlorthalidone (HYGROTON) 25 MG tablet 8/11/2024 at am  Yes Yes   Sig: Take 12.5 mg by mouth daily   digoxin (LANOXIN) 0.25 MG tablet 8/11/2024 at am  Yes Yes   Sig: Take 1 tablet  by mouth every morning   levothyroxine (SYNTHROID/LEVOTHROID) 25 MCG tablet 8/10/2024 at am  Yes Yes   Sig: Take 25 mcg by mouth five times a week Monday through Friday   levothyroxine (SYNTHROID/LEVOTHROID) 50 MCG tablet 8/11/2024 at am  Yes Yes   Sig: Take 50 mcg by mouth twice a week Saturday and Sunday   lisinopril (ZESTRIL) 40 MG tablet 8/11/2024 at am  Yes Yes   Sig: Take 40 mg by mouth every morning      Facility-Administered Medications: None        Review of Systems    ROS negative except as noted in HPI above      Social History   I have reviewed this patient's social history and updated it with pertinent information if needed.  Social History     Tobacco Use    Smoking status: Never    Smokeless tobacco: Never        Physical Exam   Vital Signs: Temp: 97.7  F (36.5  C) Temp src: Oral BP: (!) 157/68 (RN aware) Pulse: 59   Resp: 18 SpO2: 96 % O2 Device: None (Room air)    Weight: 130 lbs 4.67 oz    Constitutional: fatigued and alert  Eyes: Pupils equal, round and reactive to light, extra ocular muscles intact, sclera clear, conjunctiva normal  ENT: normocephalic, without obvious abnormality, atraumatic  Hematologic / Lymphatic: no cervical lymphadenopathy and no supraclavicular lymphadenopathy  Respiratory: No increased work of breathing, good air exchange, clear to auscultation bilaterally, no crackles or wheezing  Cardiovascular: Bradycardic though regular rhythm, 3/6 systolic murmur loudest at LUSB  GI: No scars, normal bowel sounds, soft, non-distended, non-tender, no masses palpated, no hepatosplenomegally  Skin: no rashes and no lesions  Musculoskeletal: There is no redness, warmth, or swelling of the joints. Spontaneously moves all four extremities.  Neurologic: Awake, alert, oriented to name only.  Cranial nerves II-XII are intact.  Motor is 5 out of 5 bilaterally. Sensory is intact to light touch in upper and lower extremities.   Neuropsychiatric: General: normal, calm, and normal eye  contact  Level of consciousness: alert / normal  Affect: normal and tired  Orientation: oriented only to self    Medical Decision Making       Please see A&P for additional details of medical decision making.      Data     I have personally reviewed the following data over the past 24 hrs:    10.1  \   14.7   / 225     141 98 28.4 (H) /  84   3.2 (L) 29 1.27 (H) \     ALT: 20 AST: 36 AP: 76 TBILI: 1.1   ALB: 3.6 TOT PROTEIN: 6.7 LIPASE: 24     INR:  1.80 (H) PTT:  N/A   D-dimer:  N/A Fibrinogen:  N/A       Imaging results reviewed over the past 24 hrs:   Recent Results (from the past 24 hour(s))   XR Chest Port 1 View    Narrative    EXAM: XR CHEST PORT 1 VIEW  LOCATION: [facility]  DATE: 8/11/2024    INDICATION: Weakness.   COMPARISON: Chest radiograph 7/19/2024.       Impression    IMPRESSION:     Unchanged moderately elevated right hemidiaphragm with adjacent right basilar atelectasis. Lungs are otherwise clear, without evidence of pneumonia. No pleural effusions or pneumothorax. Normal pulmonary vascularity.    Stable cardiomegaly. Unchanged aortic atherosclerotic calcifications.    Calcifications within the right axilla/chest wall are unchanged.   CT Head w/o Contrast    Narrative    EXAM: CT HEAD W/O CONTRAST  LOCATION: [facility]  DATE: 8/11/2024    INDICATION: weakness recent cva  COMPARISON: 8/6/2024  TECHNIQUE: Routine CT Head without IV contrast. Multiplanar reformats. Dose reduction techniques were used.    FINDINGS:  INTRACRANIAL CONTENTS: No intracranial hemorrhage, extraaxial collection, or mass effect.  Expected interval evolution of known MCA distribution. No new acute infarct identified. Mild presumed chronic small vessel ischemic changes. Mild to moderate   generalized volume loss. No hydrocephalus.     VISUALIZED ORBITS/SINUSES/MASTOIDS: Prior bilateral cataract surgery. Visualized portions of the orbits are otherwise unremarkable. No paranasal  sinus mucosal disease. No middle ear or mastoid effusion.    BONES/SOFT TISSUES: No acute abnormality.      Impression    IMPRESSION:  1.  No significant change from prior.

## 2024-08-12 NOTE — PROVIDER NOTIFICATION
Text sent to house officer via [facility].  BP elevated this am.  BG low on am labs.  Breakfast ordered, orange juice given and will continue to monitor.

## 2024-08-12 NOTE — SIGNIFICANT EVENT
Significant Event Note    Time of event: 6:32 PM August 12, 2024    Description of event:  Received page from nursing regarding update from poison control.    Alicia Coates is a 89 year old female admitted on 8/11/2024. 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  Found to have elevated digoxin level on evaluation at 3.1.  Admitted for management of possible side effects of elevated digoxin versus possible viral gastroenteritis.    Nursing spoke with poison control.  They advised to stop testing potassium and digoxin and they are signing off now.  Patient likely to discharge tomorrow.      Plan:  Diarrhea  Patient admitted with concerns for digoxin toxicity.  Recent lab work normal with normal potassium and therapeutic digoxin level.  Poison control has advised to stop testing potassium and digoxin and they are signing off.  Patient likely to discharge tomorrow.  -Will stop digoxin and potassium testing  -Likely to discharge tomorrow    Discussed with: bedside nurse    Fredo Mahan MD

## 2024-08-12 NOTE — PROGRESS NOTES
"   08/12/24 0830   Appointment Info   Signing Clinician's Name / Credentials (PT) Dayana Bedolla, PT, DPT   Quick Adds   Quick Adds Certification   Living Environment   People in Home facility resident   Current Living Arrangements other (see comments)  (memory care)   Home Accessibility no concerns   Self-Care   Equipment Currently Used at Home walker, rolling   Activity/Exercise/Self-Care Comment Patient is a poor historian, unable to provide PLOF information. Above information obtained from chart review.   General Information   Onset of Illness/Injury or Date of Surgery 08/11/24   Referring Physician Homero Coker MD   Patient/Family Therapy Goals Statement (PT) None stated.   Pertinent History of Current Problem (include personal factors and/or comorbidities that impact the POC) Per H&P: \"89 year old female admitted on 8/11/2024. 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  Found to have elevated digoxin level on evaluation at 3.1.  Admitted for management of possible side effects of elevated digoxin versus possible viral gastroenteritis.\"   Existing Precautions/Restrictions fall   Range of Motion (ROM)   Range of Motion ROM deficits secondary to weakness   Strength (Manual Muscle Testing)   Strength (Manual Muscle Testing) Deficits observed during functional mobility   Bed Mobility   Bed Mobility supine-sit   Supine-Sit [place] (Bed Mobility) minimum assist (75% patient effort);verbal cues   Bed Mobility Limitations decreased ability to use arms for pushing/pulling;decreased ability to use legs for bridging/pushing;cognitive deficits   Impairments Contributing to Impaired Bed Mobility decreased strength   Assistive Device (Bed Mobility) bed rails   Transfers   Transfers sit-stand transfer;bed-chair transfer   Transfer Safety " Concerns Noted decreased weight-shifting ability   Impairments Contributing to Impaired Transfers impaired balance;decreased strength   Bed-Chair Transfer   Assistive Device (Bed-Chair Transfers) walker, front-wheeled   Bed-Chair Wilkinson (Transfers) minimum assist (75% patient effort);verbal cues   Sit-Stand Transfer   Sit-Stand Wilkinson (Transfers) minimum assist (75% patient effort);verbal cues   Assistive Device (Sit-Stand Transfers) walker, front-wheeled   Gait/Stairs (Locomotion)   Wilkinson Level (Gait) minimum assist (75% patient effort);verbal cues   Assistive Device (Gait) walker, front-wheeled   Distance in Feet (Gait) 3'   Pattern (Gait) step-to   Deviations/Abnormal Patterns (Gait) carroll decreased;gait speed decreased   Clinical Impression   Criteria for Skilled Therapeutic Intervention Yes, treatment indicated   PT Diagnosis (PT) impaired functional mobility   Influenced by the following impairments decreased strength, impaired balance, decreased activity tolerance   Functional limitations due to impairments transfers, ambulation   Clinical Presentation (PT Evaluation Complexity) evolving   Clinical Presentation Rationale Clinical judgment.   Clinical Decision Making (Complexity) moderate complexity   Planned Therapy Interventions (PT) balance training;bed mobility training;gait training;home exercise program;neuromuscular re-education;patient/family education;strengthening;transfer training   Risk & Benefits of therapy have been explained evaluation/treatment results reviewed;participants voiced agreement with care plan;participants included;patient   PT Total Evaluation Time   PT Eval, Moderate Complexity Minutes (86252) 10   Therapy Certification   Start of care date 08/12/24   Certification date from 08/12/24   Certification date to 08/19/24   Medical Diagnosis elevated digoxin level   Physical Therapy Goals   PT Frequency 5x/week   PT Predicted Duration/Target Date for Goal Attainment  08/19/24   PT: Bed Mobility Supervision/stand-by assist;Supine to/from sit   PT: Transfers Supervision/stand-by assist;Sit to/from stand;Assistive device   PT: Gait Supervision/stand-by assist;Assistive device;Rolling walker;Greater than 200 feet   Interventions   Interventions Quick Adds Therapeutic Activity   Therapeutic Activity   Therapeutic Activities: dynamic activities to improve functional performance Minutes (22050) 10   Symptoms Noted During/After Treatment Fatigue   Treatment Detail/Skilled Intervention Eval completed, treatment initiated. After transfer to bedside chair, patient notes fatigue and request to lie back in bed. Charge RN present to assess vitals, noted low HR in 30s. Patient completes second transfer chair > bed with FWW, min assist of 1 and sit > supine, moda ssist of 1. Pt supine in bed at end of session, call light in reach, bed alarm engaged.   PT Discharge Planning   PT Plan transfers, gait with FWW and chair follow   PT Discharge Recommendation (DC Rec) Long term care facility  (with continued physical therapy)   PT Rationale for DC Rec Currently recommend 24 hour supervision/assist and hands on assist of 1  for transfers and ambulation. Patient may require wheelchair for longer distances. If current memory care is able to provide this level of assist, anticipate patient could return. If unable to provide this level of assist, recommend alternative long term care.   PT Brief overview of current status Supine <> sit, min to mod assist of 1. Sit <> stand and pivot transfer, min assist of 1.   Total Session Time   Timed Code Treatment Minutes 10   Total Session Time (sum of timed and untimed services) 20

## 2024-08-12 NOTE — PLAN OF CARE
Problem: Confusion Chronic  Goal: Optimal Cognitive Function  Outcome: Progressing  Intervention: Minimize Injury Risk and Provide Safety  Recent Flowsheet Documentation  Taken 8/12/2024 0445 by Tony Burris RN  Enhanced Safety Measures: room near unit station  Taken 8/11/2024 2330 by Tony Burris RN  Enhanced Safety Measures: room near unit station     Problem: Fall Injury Risk  Goal: Absence of Fall and Fall-Related Injury  Outcome: Progressing  Intervention: Identify and Manage Contributors  Recent Flowsheet Documentation  Taken 8/12/2024 0445 by Tony Burris RN  Medication Review/Management: medications reviewed  Taken 8/11/2024 2330 by Tony Burris RN  Medication Review/Management: medications reviewed  Intervention: Promote Injury-Free Environment  Recent Flowsheet Documentation  Taken 8/12/2024 0445 by Tony Burris RN  Safety Promotion/Fall Prevention: activity supervised  Taken 8/11/2024 2330 by Tony Burris RN  Safety Promotion/Fall Prevention: activity supervised   Goal Outcome Evaluation:       BP was a little high but within parameters. Other VSS. DNR/DNI. Denied pain, n/v, dizziness. Pt disoriented to place, time, and situation. SBA with gait belt and walker. Purwick was removed and pt voiding in toilet. No BM this shift. Pt had a potassium of 3.0 and 40meq tablets given as replacement. Pt has digoxin level check q 4 hrs, has gone down to 1.7. At 1253 pt had a 3.7 sec pause on tele,  notified. Tele showed a-fib/bradycardic.

## 2024-08-12 NOTE — PLAN OF CARE
Goal Outcome Evaluation:       Poison control updated on Dig level 2.3 and 2.2 and potassium 3.2. Informed of heart rate fluctuating 39-45 then going up to the 50s. Tele shows Afib.. Per poison control would not need reversal agent due to dig level almost back to normal. Would be concerned had high potassium level or change in rhythm or had symptoms. Poison control will call back tomorrow

## 2024-08-12 NOTE — PLAN OF CARE
Problem: Adult Inpatient Plan of Care  Goal: Absence of Hospital-Acquired Illness or Injury  Intervention: Prevent Skin Injury  Recent Flowsheet Documentation  Taken 8/12/2024 0810 by Heidi Mahoney, RN  Body Position: position changed independently  Device Skin Pressure Protection: absorbent pad utilized/changed     Problem: Confusion Chronic  Goal: Optimal Cognitive Function  Outcome: Progressing  Intervention: Minimize Injury Risk and Provide Safety  Recent Flowsheet Documentation  Taken 8/12/2024 0810 by Heidi Mahoney, RN  Enhanced Safety Measures: room near unit station   Goal Outcome Evaluation:      Plan of Care Reviewed With: patient    Overall Patient Progress: improvingOverall Patient Progress: improving       Patient alert to self.  Stated nausea this am ODT Zofran administered.  Remains bradycardic.  Elevated BP this am recheck and improved.  MD restarted lisinopril, home med.  Up to chair with therapy this am.

## 2024-08-12 NOTE — PROGRESS NOTES
"PRIMARY DIAGNOSIS: \"GENERIC\" NURSING  OUTPATIENT/OBSERVATION GOALS TO BE MET BEFORE DISCHARGE:  ADLs back to baseline: No    Activity and level of assistance: Assist of one    Pain status: Pain free.    Return to near baseline physical activity: No     Discharge Planner Nurse   Safe discharge environment identified: Yes- Patient is being followed by poison control; Son would like to talk to Ann Posey about hospice options through their facility.  Barriers to discharge: Yes       Entered by: Heidi Mahoney RN 08/12/2024 2:20 PM     Please review provider order for any additional goals.   Nurse to notify provider when observation goals have been met and patient is ready for discharge.  "

## 2024-08-13 NOTE — PROGRESS NOTES
Care Management Discharge Note    Discharge Date: 08/13/2024       Discharge Disposition: Assisted Living    Discharge Services:      Discharge DME: None    Discharge Transportation: health plan transportation    Private pay costs discussed: transportation costs    Does the patient's insurance plan have a 3 day qualifying hospital stay waiver?  Yes     Which insurance plan 3 day waiver is available? Alternative insurance waiver    Will the waiver be used for post-acute placement? No    PAS Confirmation Code:    Patient/family educated on Medicare website which has current facility and service quality ratings: no    Education Provided on the Discharge Plan: Yes  Persons Notified of Discharge Plans: johnna Chavez   Patient/Family in Agreement with the Plan: yes    Handoff Referral Completed: Yes    Additional Information:  See below     Sherin Pino RN        Met with patient and son Scott. Agrees to plan to return to [facility] today and meet with hospice upon return. Requests stretcher transport    Called Lawanda with [facility] hospice. Says she has been in contact with Jeanette from [facility] and they will meet patient there this evening to do admission. Says she can pull orders from epic.     Message left for Jeanette at [facility]       Transport arranged for [id number]  Updated son    1:27 PM  Spoke to Jeanette at [facility]. She is agreeable to plan

## 2024-08-13 NOTE — PLAN OF CARE
Goal Outcome Evaluation:    Pt had no complaints of pain this shift. No nausea noted. Afib on the monitor. Did have a x7 beat run of VTACH this shift. K protocol. Recheck tomorrow. Diabetic. No accuchecks. Pt is alert. Disorientated to place, time, and situation. Hx of dementia. Up with an assist of one with transfers and ambulation using a walker and gait belt. Plan is for discharge back to Memory Care between 1808-2854. Will continue to monitor.

## 2024-08-13 NOTE — PLAN OF CARE
Goal Outcome Evaluation:    Pt discharged back to [facility] at 1700 via [place] [facility].

## 2024-08-13 NOTE — DISCHARGE SUMMARY
[facility]  Discharge Summary - Medicine & Pediatrics       Date of Admission:  8/11/2024  Date of Discharge:  8/13/2024  Discharging Provider: Dr. Jama, Dr. Joiner  Discharge Service: Hospitalist Service    Discharge Diagnoses   Diarrhea, resolved  Digoxin toxicity, improved  Hypokalemia, resolved    Clinically Significant Risk Factors          Follow-ups Needed After Discharge   Follow-up Appointments     Follow Up and recommended labs and tests      Follow up with primary care provider in 7 days.  The following labs/tests   are recommended: digoxin level, BMP.        Patient plans for outpatient hospice    Unresulted Labs Ordered in the Past 30 Days of this Admission       No orders found from 7/12/2024 to 8/12/2024.        These results will be followed up by PCP    Discharge Disposition   Discharged to home  Condition at discharge: Stable    Hospital Course   Alicia Coates was admitted on 8/11/2024 for diarrhea, elevated digoxin level, poss. Viral gastroenteritis .  The following problems were addressed during her hospitalization:    Diarrhea  Elevated Digoxin Level  Bradycardia  Atrial Fibrillation  1 day of substantial diarrhea and report of some vomiting the last day, found to have elevated digoxin level of 3.1 on admission, repeat 4 hours later improved to 2.3.  Unclear if the elevated digoxin is what caused the diarrhea with known GI side effects of digoxin, or if patient has viral gastroenteritis and corresponding volume loss led to increased concentration of digoxin.  No leukocytosis.  Review of systems unremarkable for clear infectious cause.  Hemodynamically stable. Had been bradycardic primarily in low 50s with occasional dips into the 40s though has been asymptomatic with no dizziness or lightheadedness. HR stable 50's-70's after discontinuing digoxin. Case was discussed with poison control, recommended lab monitoring for digoxin level (improved to therapeutic  range), Potassium, Mg. Given resolution of labs 8/12, poison control signed off. Improved with fluids.             -Discussed with son regarding options of continuing digoxin at lower dose vs discontinuation, son noted that due to decreased PO intake he was more concerned about toxicity vs potential afib w/RVR, and given impending hospice eval, elected to discontinue digoxin at discharge  -PTA apixaban 2.5 mg twice daily     SUNSHINE, likely pre-renal  Per report from family patient has had decreased p.o. intake recently, creatinine elevated to 1.27 on admission BUN 28.4.  Suspect this is prerenal SUNSHINE secondary to dehydration. Cr. Improved to 0.87 8/12  -Maintenance fluid LR 75 mL/h  -Daily BMP  -Resume lisinopril PTA     Hypokalemia  Potassium on admission 3.6, decreased to 3.2 later on 8/11. Resolved  -Potassium replacement protocol initiated and resolved     Weakness  Failure to Thrive   Dementia  Goals of Care  After discussion with patient's son, concern for failure to thrive in the setting of her advancing dementia and recent stroke.  Has been losing interest in activities of daily living with decreased p.o. intake.  Would recommend ongoing discussions with patient and family about goals of care given circumstances of this admission and planning for future admissions.   - Was discussion from family of consulting palliative care during previous admission in July 2024.  -discussed w/son at bedside 8/12/24, offered hospice services referral however son would like to wait until she returns to her facility before making decisions, continue treatments as indicated for now        Chronic problems:     HTN  Was reported to be hypotensive at [facility], has improved and now normotensive to hypertensive.  -PTA chlorthalidone  -SUNSHINE resolved, continue Lisinopril PTA at discharge     HLD  Hx of L Occipital Stroke 7/2024  -PTA atorvastatin     Hypothyroidism  -PTA levothyroxine (25 mcg weekdays, 50 mcg weekends ordered)      T2DM  Not on home meds.  Glucose on admission 130. Glucose 74 8/12. Most recent A1c 1 year ago 7.3.         Consultations This Hospital Stay   PHYSICAL THERAPY ADULT IP CONSULT  CARE MANAGEMENT / SOCIAL WORK IP CONSULT  OCCUPATIONAL THERAPY ADULT IP CONSULT  INPATIENT HOSPICE ADULT CONSULT    Code Status   No CPR- Do NOT Intubate       The patient was discussed with Dr. Rhys Jama MD  [address] [address]  Phone: [phone]  Fax: [phone]  ______________________________________________________________________    Physical Exam   Vital Signs: Temp: 98.4  F (36.9  C) Temp src: Oral BP: (!) 151/68 (notified nurse) Pulse: 58   Resp: 18 SpO2: 95 % O2 Device: None (Room air)    Weight: 130 lbs 4.67 oz    Constitutional: fatigued and alert  Eyes: Pupils equal, round and reactive to light, extra ocular muscles intact, sclera clear, conjunctiva normal  ENT: normocephalic, without obvious abnormality, atraumatic  Hematologic / Lymphatic: no cervical lymphadenopathy and no supraclavicular lymphadenopathy  Respiratory: No increased work of breathing, good air exchange, clear to auscultation bilaterally, no crackles or wheezing  Cardiovascular: Bradycardic, irregular rhythm, 3/6 systolic murmur loudest at LUSB  GI: No scars, normal bowel sounds, soft, non-distended, non-tender, no masses palpated, no hepatosplenomegally  Skin: no rashes and no lesions  Musculoskeletal: There is no redness, warmth, or swelling of the joints. Spontaneously moves all four extremities.  Neurologic: Awake, alert, oriented to name only.  Cranial nerves II-XII are intact.  Motor is 5 out of 5 bilaterally. Sensory is intact to light touch in upper and lower extremities.   Neuropsychiatric: General: normal, calm, and normal eye contact  Level of consciousness: alert / normal  Affect: normal and tired  Orientation: oriented only to self          Primary Care Physician   FRANDY DOW    Discharge Orders      General info for SNF    Length of Stay Estimate: Long Term Care  Condition at Discharge: Declining  Level of care:skilled   Rehabilitation Potential: Poor  Admission H&P remains valid and up-to-date: Yes  Recent Chemotherapy: N/A  Use Nursing Home Standing Orders: Yes     Follow Up and recommended labs and tests    Follow up with primary care provider in 7 days.  The following labs/tests are recommended: digoxin level, BMP.     Reason for your hospital stay    Diarrhea, digoxin toxicity (improved)     Activity - Up with nursing assistance     Fall precautions     Diet    Follow this diet upon discharge: Orders Placed This Encounter      Moderate Consistent Carb (60 g CHO per Meal) Diet       Significant Results and Procedures   Most Recent 3 CBC's:  Recent Labs   Lab Test 08/13/24  0639 08/12/24  0655 08/11/24  1441   WBC 6.0 11.3* 10.1   HGB 12.5 14.7 14.7   MCV 91 90 89    215 225     Most Recent 3 BMP's:  Recent Labs   Lab Test 08/13/24  0639 08/12/24  1746 08/12/24  1425 08/12/24  0946 08/12/24  0833 08/12/24  0655 08/11/24  2217 08/11/24  1441     --   --   --   --  141  --  141   POTASSIUM 3.6 3.8 3.7   < >  --  3.6  3.6   < > 3.6   CHLORIDE 103  --   --   --   --  103  --  98   CO2 28  --   --   --   --  24  --  29   BUN 19.5  --   --   --   --  26.5*  --  28.4*   CR 0.85  --   --   --   --  0.87  --  1.27*   ANIONGAP 10  --   --   --   --  14  --  14   EBONI 8.5*  --   --   --   --  8.7*  --  9.0   GLC 74  --   --   --  74 69*   < > 130*    < > = values in this interval not displayed.     Most Recent 3 Troponin's:No lab results found.  Most Recent Hemoglobin A1c:  Recent Labs   Lab Test 07/13/23  0901   A1C 7.3*     Most Recent 6 glucoses:  Recent Labs   Lab Test 08/13/24  0639 08/12/24  0833 08/12/24  0655 08/11/24  2220 08/11/24  1441 08/06/24  0510   GLC 74 74 69* 84 130* 124*   ,   Results for orders placed or performed during  the hospital encounter of 08/11/24   XR Chest Port 1 View    Narrative    EXAM: XR CHEST PORT 1 VIEW  LOCATION: [facility]  DATE: 8/11/2024    INDICATION: Weakness.   COMPARISON: Chest radiograph 7/19/2024.       Impression    IMPRESSION:     Unchanged moderately elevated right hemidiaphragm with adjacent right basilar atelectasis. Lungs are otherwise clear, without evidence of pneumonia. No pleural effusions or pneumothorax. Normal pulmonary vascularity.    Stable cardiomegaly. Unchanged aortic atherosclerotic calcifications.    Calcifications within the right axilla/chest wall are unchanged.   CT Head w/o Contrast    Narrative    EXAM: CT HEAD W/O CONTRAST  LOCATION: [facility]  DATE: 8/11/2024    INDICATION: weakness recent cva  COMPARISON: 8/6/2024  TECHNIQUE: Routine CT Head without IV contrast. Multiplanar reformats. Dose reduction techniques were used.    FINDINGS:  INTRACRANIAL CONTENTS: No intracranial hemorrhage, extraaxial collection, or mass effect.  Expected interval evolution of known MCA distribution. No new acute infarct identified. Mild presumed chronic small vessel ischemic changes. Mild to moderate   generalized volume loss. No hydrocephalus.     VISUALIZED ORBITS/SINUSES/MASTOIDS: Prior bilateral cataract surgery. Visualized portions of the orbits are otherwise unremarkable. No paranasal sinus mucosal disease. No middle ear or mastoid effusion.    BONES/SOFT TISSUES: No acute abnormality.      Impression    IMPRESSION:  1.  No significant change from prior.       Discharge Medications   Current Discharge Medication List        CONTINUE these medications which have NOT CHANGED    Details   apixaban ANTICOAGULANT (ELIQUIS) 2.5 MG tablet Take 1 tablet (2.5 mg) by mouth 2 times daily  Qty: 60 tablet, Refills: 0    Associated Diagnoses: Occipital infarction (H)      atorvastatin (LIPITOR) 40 MG tablet Take 1 tablet (40 mg) by mouth daily  Qty: 30  tablet, Refills: 0    Associated Diagnoses: Occipital infarction (H)      chlorthalidone (HYGROTON) 25 MG tablet Take 12.5 mg by mouth daily      !! levothyroxine (SYNTHROID/LEVOTHROID) 25 MCG tablet Take 25 mcg by mouth five times a week Monday through Friday      !! levothyroxine (SYNTHROID/LEVOTHROID) 50 MCG tablet Take 50 mcg by mouth twice a week Saturday and Sunday      lisinopril (ZESTRIL) 40 MG tablet Take 40 mg by mouth every morning       !! - Potential duplicate medications found. Please discuss with provider.        STOP taking these medications       digoxin (LANOXIN) 0.25 MG tablet Comments:   Reason for Stopping:             Allergies   No Known Allergies    Homero Jama MD  PGY-2  [facility]  [facility]  August 13, 2024

## 2024-08-13 NOTE — PROGRESS NOTES
Pt had 7 beats of V-tach and one pause during using bathroom around 1440.  Writer went to assess patient. NA was assisting pt using bathroom at that time, reported nothing unusual observed, pt was asymptomatic. No signs of stress or no SOB noted. Notified Dr Carlos about it.     Carmen Foster RN

## 2024-08-13 NOTE — PLAN OF CARE
Problem: Risk for Delirium  Goal: Improved Sleep  Outcome: Adequate for Care Transition     Problem: Risk for Delirium  Goal: Optimal Coping  Outcome: Adequate for Care Transition     Problem: Adult Inpatient Plan of Care  Goal: Optimal Comfort and Wellbeing  Outcome: Adequate for Care Transition  Intervention: Monitor Pain and Promote Comfort  Recent Flowsheet Documentation  Taken 8/12/2024 2000 by Huong Koehler RN  Pain Management Interventions: declines   Goal Outcome Evaluation:  Pt Alert to self, cooperative with cares. O2 sats WNL. Pt is continent of BB, takes pills whole. Pt walked to the bathroom with Assistx1 with her walker, denies pain, no changhe in baseline overnight.

## 2024-08-14 NOTE — PROGRESS NOTES
[facility]    Background: Transitional Care Management program identified per system criteria and reviewed by [facility] team for possible outreach.    Assessment: Upon chart review, [facility] Team member will not proceed with patient outreach related to this episode of Transitional Care Management program due to reason below:    Patient has discharged to a Memory Care, Long-term Care, Assisted Living or Group Home where patient is receiving on-site support with their daily cares, including support with hospital follow up plan.    Patient discharged back to her Assisted Living Facility with Hospice Care.    Plan: Transitional Care Management episode addressed appropriately per reason noted above.      GAY Denson  [phone]  [facility]     *Connected Care Resource Team does NOT follow patient ongoing. Referrals are identified based on internal discharge reports and the outreach is to ensure patient has an understanding of their discharge instructions.

## 2024-10-02 NOTE — CARE PLAN
"PRIMARY DIAGNOSIS: \"GENERIC\" NURSING  OUTPATIENT/OBSERVATION GOALS TO BE MET BEFORE DISCHARGE:  ADLs back to baseline: No    Activity and level of assistance: Up with standby assistance with walker and gait belt.    Pain status: Pain free.    Return to near baseline physical activity: No     Discharge Planner Nurse   Safe discharge environment identified: No  Barriers to discharge: Yes       Entered by: Tony Burris RN 08/12/2024 5:35 AM     Please review provider order for any additional goals.   Nurse to notify provider when observation goals have been met and patient is ready for discharge.  " Planning right hip arthroplasty